# Patient Record
Sex: MALE | Race: WHITE | NOT HISPANIC OR LATINO | Employment: OTHER | URBAN - METROPOLITAN AREA
[De-identification: names, ages, dates, MRNs, and addresses within clinical notes are randomized per-mention and may not be internally consistent; named-entity substitution may affect disease eponyms.]

---

## 2017-03-28 RX ORDER — ATORVASTATIN CALCIUM 20 MG/1
TABLET, FILM COATED ORAL
Qty: 30 TABLET | Refills: 2 | Status: SHIPPED | OUTPATIENT
Start: 2017-03-28 | End: 2017-07-06 | Stop reason: SDUPTHER

## 2017-04-14 ENCOUNTER — TELEPHONE (OUTPATIENT)
Dept: INTERNAL MEDICINE | Facility: CLINIC | Age: 67
End: 2017-04-14

## 2017-04-14 RX ORDER — IRBESARTAN 300 MG/1
TABLET ORAL
Qty: 90 TABLET | Refills: 1 | Status: SHIPPED | OUTPATIENT
Start: 2017-04-14 | End: 2017-10-12 | Stop reason: SDUPTHER

## 2017-04-14 NOTE — TELEPHONE ENCOUNTER
----- Message from Karen Wu sent at 4/14/2017 10:47 AM EDT -----  Contact: Patient's wife  Patient's wife called stating patient having problems with sleep.  Goes to sleep okay but wakes up shortly after for several hours.  Would like a script for zolpidem #90.  Please advise.      Patient:  [phone]    Pharmacy:  [facility] [address] [place] [address] [address] AT [facility] [address] - [phone]  - [phone] FX

## 2017-04-14 NOTE — TELEPHONE ENCOUNTER
I recommend Belsomra which is better for sleep maintenance.   Rx Belsomra 15 mgm 1 po qhs prn insomnia #30 x 5 cfb

## 2017-04-18 ENCOUNTER — TELEPHONE (OUTPATIENT)
Dept: INTERNAL MEDICINE | Facility: CLINIC | Age: 67
End: 2017-04-18

## 2017-04-18 RX ORDER — ZOLPIDEM TARTRATE 10 MG/1
10 TABLET ORAL NIGHTLY PRN
Qty: 30 TABLET | Refills: 5
Start: 2017-04-18 | End: 2018-05-02 | Stop reason: SDUPTHER

## 2017-04-18 NOTE — TELEPHONE ENCOUNTER
----- Message from Margaret Greene sent at 4/18/2017  2:52 PM EDT -----  Contact: Spouse - Dr Mcginnis' pt - RE: new Rx  Spouse calling and would like a new Rx called to pt's pharmacy. Spouse informs that Rx costs over $400.00 and informs that pt can not afford Rx. Could you please call new Rx to pt's pharmacy? Please advise. Thanks      I recommend Belsomra which is better for sleep maintenance.   Rx Belsomra 15 mgm 1 po qhs prn insomnia #30 x 5 cfb    Spouse would like to know if pt could go back on [facility] [address] [place] [address] [address] AT [facility] [address] - [phone]  - [phone] (Phone)  [phone] (Fax)    Pt # [phone] or [phone]

## 2017-07-06 RX ORDER — ATORVASTATIN CALCIUM 20 MG/1
TABLET, FILM COATED ORAL
Qty: 30 TABLET | Refills: 1 | Status: SHIPPED | OUTPATIENT
Start: 2017-07-06 | End: 2017-08-28 | Stop reason: SDUPTHER

## 2017-08-28 RX ORDER — ATORVASTATIN CALCIUM 20 MG/1
TABLET, FILM COATED ORAL
Qty: 30 TABLET | Refills: 0 | Status: SHIPPED | OUTPATIENT
Start: 2017-08-28 | End: 2017-09-25 | Stop reason: SDUPTHER

## 2017-09-25 RX ORDER — ATORVASTATIN CALCIUM 20 MG/1
TABLET, FILM COATED ORAL
Qty: 30 TABLET | Refills: 0 | Status: SHIPPED | OUTPATIENT
Start: 2017-09-25 | End: 2017-10-23 | Stop reason: SDUPTHER

## 2017-10-12 DIAGNOSIS — E03.9 HYPOTHYROIDISM, UNSPECIFIED TYPE: ICD-10-CM

## 2017-10-12 RX ORDER — LEVOTHYROXINE SODIUM 88 UG/1
TABLET ORAL
Qty: 90 TABLET | Refills: 1 | Status: SHIPPED | OUTPATIENT
Start: 2017-10-12 | End: 2017-11-06 | Stop reason: DRUGHIGH

## 2017-10-12 RX ORDER — IRBESARTAN 300 MG/1
TABLET ORAL
Qty: 90 TABLET | Refills: 1 | Status: SHIPPED | OUTPATIENT
Start: 2017-10-12 | End: 2018-01-15 | Stop reason: SDUPTHER

## 2017-10-23 RX ORDER — ATORVASTATIN CALCIUM 20 MG/1
TABLET, FILM COATED ORAL
Qty: 30 TABLET | Refills: 3 | Status: SHIPPED | OUTPATIENT
Start: 2017-10-23 | End: 2018-02-19 | Stop reason: SDUPTHER

## 2017-11-01 ENCOUNTER — OFFICE VISIT (OUTPATIENT)
Dept: INTERNAL MEDICINE | Facility: CLINIC | Age: 67
End: 2017-11-01

## 2017-11-01 ENCOUNTER — HOSPITAL ENCOUNTER (OUTPATIENT)
Dept: GENERAL RADIOLOGY | Facility: HOSPITAL | Age: 67
Discharge: HOME | End: 2017-11-01
Admitting: NURSE PRACTITIONER

## 2017-11-01 VITALS
OXYGEN SATURATION: 98 % | HEIGHT: 71 IN | HEART RATE: 52 BPM | BODY MASS INDEX: 27.02 KG/M2 | SYSTOLIC BLOOD PRESSURE: 140 MMHG | WEIGHT: 193 LBS | DIASTOLIC BLOOD PRESSURE: 92 MMHG

## 2017-11-01 DIAGNOSIS — Z12.11 SCREENING FOR COLON CANCER: ICD-10-CM

## 2017-11-01 DIAGNOSIS — Z23 NEED FOR INFLUENZA VACCINATION: ICD-10-CM

## 2017-11-01 DIAGNOSIS — Z00.00 PE (PHYSICAL EXAM), ANNUAL: Primary | ICD-10-CM

## 2017-11-01 DIAGNOSIS — E03.9 HYPOTHYROIDISM, UNSPECIFIED TYPE: ICD-10-CM

## 2017-11-01 DIAGNOSIS — I10 BENIGN ESSENTIAL HTN: ICD-10-CM

## 2017-11-01 DIAGNOSIS — M54.41 ACUTE RIGHT-SIDED LOW BACK PAIN WITH RIGHT-SIDED SCIATICA: ICD-10-CM

## 2017-11-01 DIAGNOSIS — Z23 NEED FOR VACCINATION: ICD-10-CM

## 2017-11-01 DIAGNOSIS — Z12.5 SCREENING FOR PROSTATE CANCER: ICD-10-CM

## 2017-11-01 DIAGNOSIS — Z11.59 SCREENING FOR VIRAL DISEASE: ICD-10-CM

## 2017-11-01 LAB
ALBUMIN SERPL-MCNC: 4.7 G/DL (ref 3.5–5.2)
ALBUMIN/GLOB SERPL: 1.8 G/DL
ALP SERPL-CCNC: 67 U/L (ref 39–117)
ALT SERPL-CCNC: 28 U/L (ref 1–41)
AST SERPL-CCNC: 19 U/L (ref 1–40)
BASOPHILS # BLD AUTO: 0.03 10*3/MM3 (ref 0–0.2)
BASOPHILS NFR BLD AUTO: 0.6 % (ref 0–1.5)
BILIRUB SERPL-MCNC: 0.5 MG/DL (ref 0.1–1.2)
BILIRUB UR QL STRIP: NEGATIVE
BUN SERPL-MCNC: 19 MG/DL (ref 8–23)
BUN/CREAT SERPL: 14.8 (ref 7–25)
CALCIUM SERPL-MCNC: 9.5 MG/DL (ref 8.6–10.5)
CHLORIDE SERPL-SCNC: 101 MMOL/L (ref 98–107)
CHOLEST SERPL-MCNC: 151 MG/DL (ref 0–200)
CLARITY UR: CLEAR
CO2 SERPL-SCNC: 31.8 MMOL/L (ref 22–29)
COLOR UR: YELLOW
CREAT SERPL-MCNC: 1.28 MG/DL (ref 0.76–1.27)
EOSINOPHIL # BLD AUTO: 0.29 10*3/MM3 (ref 0–0.7)
EOSINOPHIL # BLD AUTO: 5.6 % (ref 0.3–6.2)
ERYTHROCYTE [DISTWIDTH] IN BLOOD BY AUTOMATED COUNT: 14.5 % (ref 11.5–14.5)
GLOBULIN SER CALC-MCNC: 2.6 GM/DL
GLUCOSE SERPL-MCNC: 95 MG/DL (ref 65–99)
GLUCOSE UR STRIP-MCNC: NEGATIVE MG/DL
HCT VFR BLD AUTO: 48.7 % (ref 40.4–52.2)
HDLC SERPL-MCNC: 58 MG/DL (ref 40–60)
HEMOCCULT STL QL IA: NEGATIVE
HGB BLD-MCNC: 15.9 G/DL (ref 13.7–17.6)
HGB UR QL STRIP.AUTO: NEGATIVE
IMM GRANULOCYTES # BLD: 0 10*3/MM3 (ref 0–0.03)
IMM GRANULOCYTES NFR BLD: 0 % (ref 0–0.5)
KETONES UR QL STRIP: NEGATIVE
LDLC SERPL CALC-MCNC: 77 MG/DL (ref 0–100)
LEUKOCYTE ESTERASE UR QL STRIP.AUTO: NEGATIVE
LYMPHOCYTES # BLD AUTO: 2.36 10*3/MM3 (ref 0.9–4.8)
LYMPHOCYTES NFR BLD AUTO: 45.5 % (ref 19.6–45.3)
MCH RBC QN AUTO: 32.4 PG (ref 27–32.7)
MCHC RBC AUTO-ENTMCNC: 32.6 G/DL (ref 32.6–36.4)
MCV RBC AUTO: 99.2 FL (ref 79.8–96.2)
MONOCYTES # BLD AUTO: 0.37 10*3/MM3 (ref 0.2–1.2)
MONOCYTES NFR BLD AUTO: 7.1 % (ref 5–12)
NEUTROPHILS # BLD AUTO: 2.14 10*3/MM3 (ref 1.9–8.1)
NEUTROPHILS NFR BLD AUTO: 41.2 % (ref 42.7–76)
NITRITE UR QL STRIP: NEGATIVE
PH UR STRIP.AUTO: 7 [PH] (ref 5–8)
PLATELET # BLD AUTO: 270 10*3/MM3 (ref 140–500)
POTASSIUM SERPL-SCNC: 4.5 MMOL/L (ref 3.5–5.2)
PROT SERPL-MCNC: 7.3 G/DL (ref 6–8.5)
PROT UR QL STRIP: NEGATIVE
PSA SERPL-MCNC: 1.56 NG/ML (ref 0–4)
RBC # BLD AUTO: 4.91 10*6/MM3 (ref 4.6–6)
SODIUM SERPL-SCNC: 143 MMOL/L (ref 136–145)
SP GR UR STRIP: 1.01 (ref 1–1.03)
TRIGL SERPL-MCNC: 80 MG/DL (ref 0–150)
TSH SERPL-ACNC: 5.11 MIU/ML (ref 0.27–4.2)
UROBILINOGEN UR QL STRIP: NORMAL
VLDLC SERPL-MCNC: 16 MG/DL (ref 5–40)
WBC # BLD AUTO: 5.19 10*3/MM3 (ref 4.5–10.7)

## 2017-11-01 PROCEDURE — G0009 ADMIN PNEUMOCOCCAL VACCINE: HCPCS | Performed by: NURSE PRACTITIONER

## 2017-11-01 PROCEDURE — 99397 PER PM REEVAL EST PAT 65+ YR: CPT | Performed by: NURSE PRACTITIONER

## 2017-11-01 PROCEDURE — 90662 IIV NO PRSV INCREASED AG IM: CPT | Performed by: NURSE PRACTITIONER

## 2017-11-01 PROCEDURE — G0008 ADMIN INFLUENZA VIRUS VAC: HCPCS | Performed by: NURSE PRACTITIONER

## 2017-11-01 PROCEDURE — 72110 X-RAY EXAM L-2 SPINE 4/>VWS: CPT

## 2017-11-01 PROCEDURE — 81003 URINALYSIS AUTO W/O SCOPE: CPT | Performed by: NURSE PRACTITIONER

## 2017-11-01 PROCEDURE — G0328 FECAL BLOOD SCRN IMMUNOASSAY: HCPCS | Performed by: NURSE PRACTITIONER

## 2017-11-01 PROCEDURE — 90670 PCV13 VACCINE IM: CPT | Performed by: NURSE PRACTITIONER

## 2017-11-01 PROCEDURE — 93000 ELECTROCARDIOGRAM COMPLETE: CPT | Performed by: NURSE PRACTITIONER

## 2017-11-02 PROBLEM — I10 BENIGN ESSENTIAL HTN: Status: ACTIVE | Noted: 2017-11-02

## 2017-11-02 LAB — HCV AB S/CO SERPL IA: <0.1 S/CO RATIO (ref 0–0.9)

## 2017-11-02 NOTE — PROGRESS NOTES
Subjective   Anoop Montenegro is a 67 y.o. male who presents for a physical exam.    HPI Comments: He c/o worsening low back pain which is worse after prolonged sitting/standing. Pain radiates into right hip and leg. His neck pain has actually improved (hx spondylosis).       The following portions of the patient's history were reviewed and updated as appropriate: allergies, current medications, past social history and problem list.    Past Medical History:   Diagnosis Date   • Allergic rhinitis    • Cervical spondylosis with myelopathy    • FHx: colonic polyps    • GERD (gastroesophageal reflux disease)    • History of colonic polyps    • Hypertension     Benign Essential   • Hypothyroidism    • Insomnia    • Malaise and fatigue    • Pure hypercholesterolemia          Current Outpatient Prescriptions:   •  atorvastatin (LIPITOR) 20 MG tablet, TAKE 1 TABLET BY MOUTH ONE TIME A DAY , Disp: 30 tablet, Rfl: 3  •  irbesartan (AVAPRO) 300 MG tablet, TAKE 1 TABLET EVERY DAY, Disp: 90 tablet, Rfl: 1  •  levothyroxine (SYNTHROID, LEVOTHROID) 88 MCG tablet, TAKE 1 TABLET EVERY DAY, Disp: 90 tablet, Rfl: 1  •  meloxicam (MOBIC) 15 MG tablet, Take 1 tablet by mouth Daily., Disp: 90 tablet, Rfl: 1  •  Multiple Vitamins-Minerals (MULTIVITAMIN & MINERAL PO), Take 1 tablet by mouth daily., Disp: , Rfl:   •  zolpidem (AMBIEN) 10 MG tablet, Take 1 tablet by mouth At Night As Needed for Sleep., Disp: 30 tablet, Rfl: 5    No Known Allergies    Review of Systems   Constitutional: Negative for activity change, appetite change, chills, diaphoresis, fatigue, fever and unexpected weight change.   HENT: Negative for congestion, dental problem, drooling, ear discharge, ear pain, facial swelling, hearing loss, mouth sores, nosebleeds, postnasal drip, rhinorrhea, sinus pressure, sore throat, tinnitus and trouble swallowing.    Eyes: Negative for photophobia, pain, discharge, redness, itching and visual disturbance.   Respiratory: Negative for  "apnea, cough, choking, chest tightness, shortness of breath and wheezing.    Cardiovascular: Negative for chest pain, palpitations and leg swelling.        No orthopnea, PND, BURNS   Gastrointestinal: Negative for abdominal pain, blood in stool, constipation, diarrhea, nausea and vomiting.   Endocrine: Negative for cold intolerance, heat intolerance, polydipsia and polyuria.   Genitourinary: Negative for decreased urine volume, dysuria, enuresis, flank pain, frequency, hematuria and urgency.   Musculoskeletal: Positive for back pain. Negative for arthralgias, gait problem, joint swelling, myalgias, neck pain and neck stiffness.   Skin: Negative for color change and rash.        No hair changes, no nail changes   Allergic/Immunologic: Negative for environmental allergies, food allergies and immunocompromised state.   Neurological: Negative for dizziness, tremors, seizures, syncope, speech difficulty, weakness, light-headedness, numbness and headaches.   Hematological: Negative for adenopathy. Does not bruise/bleed easily.   Psychiatric/Behavioral: Negative for agitation, confusion, decreased concentration, dysphoric mood, sleep disturbance and suicidal ideas. The patient is not nervous/anxious.        Objective   Vitals:    11/01/17 0806   BP: 140/92   BP Location: Left arm   Patient Position: Sitting   Cuff Size: Adult   Pulse: 52   SpO2: 98%   Weight: 193 lb (87.5 kg)   Height: 71\" (180.3 cm)     Physical Exam   Constitutional: He is oriented to person, place, and time. He appears well-developed and well-nourished. No distress.   HENT:   Head: Normocephalic and atraumatic.   Right Ear: External ear normal.   Left Ear: External ear normal.   Nose: Nose normal.   Mouth/Throat: Oropharynx is clear and moist.   Eyes: Conjunctivae and EOM are normal. Pupils are equal, round, and reactive to light. Right eye exhibits no discharge. Left eye exhibits no discharge. No scleral icterus.   Neck: Normal range of motion. Neck " supple. No JVD present. No tracheal deviation present. No thyromegaly present.   Cardiovascular: Normal rate, regular rhythm, normal heart sounds and intact distal pulses.  Exam reveals no gallop and no friction rub.    No murmur heard.  Pulmonary/Chest: Effort normal and breath sounds normal. No respiratory distress. He has no wheezes. He has no rales. He exhibits no tenderness.   Abdominal: Soft. Bowel sounds are normal. He exhibits no distension and no mass. There is no tenderness. There is no rebound and no guarding. No hernia.   Genitourinary: Rectum normal, prostate normal and penis normal. Rectal exam shows guaiac negative stool.   Musculoskeletal: Normal range of motion. He exhibits no edema.        Lumbar back: He exhibits tenderness.   Lymphadenopathy:     He has no cervical adenopathy.   Neurological: He is alert and oriented to person, place, and time. He has normal reflexes. No cranial nerve deficit. He exhibits normal muscle tone. Coordination normal.   Skin: Skin is warm and dry. No rash noted. No erythema.   Psychiatric: He has a normal mood and affect. His behavior is normal. Judgment and thought content normal.   Vitals reviewed.      Assessment/Plan   Anoop was seen today for annual exam.    Diagnoses and all orders for this visit:    PE (physical exam), annual  Comments:  check fasting labs today  Orders:  -     CBC Auto Differential; Future  -     Comprehensive Metabolic Panel; Future  -     Lipid Panel; Future  -     TSH; Future  -     Urinalysis With / Microscopic If Indicated - Urine, Clean Catch; Future  -     CBC Auto Differential  -     Comprehensive Metabolic Panel  -     Lipid Panel  -     TSH  -     Urinalysis With / Microscopic If Indicated - Urine, Clean Catch    Hypothyroidism, unspecified type    Acute right-sided low back pain with right-sided sciatica  Comments:  send for xray to further evaluate  Orders:  -     XR Spine Lumbar 4+ View    Benign essential HTN  Comments:  slightly  elevated today, monitor at home and call with readings in 2-3 weeks    Screening for prostate cancer  -     PSA Screen; Future  -     PSA Screen    Screening for viral disease  -     Hepatitis C Antibody; Future  -     Hepatitis C Antibody    Need for vaccination  -     Pneumococcal Conjugate Vaccine 13-Valent All    Need for influenza vaccination  -     Flu Vaccine High Dose PF 65YR+    Screening for colon cancer  -     POC FECAL OCCULT BLOOD BY IMMUNOASSAY    Other orders  -     ECG 12 Lead      ECG 12 Lead  Date/Time: 11/1/2017 8:23 AM  Performed by: LEONORA BLAND  Authorized by: VALENCIA GUTHRIE   Comparison: compared with previous ECG from 10/10/2016  Similar to previous ECG  Comparison to previous ECG: No acute changes  Rhythm: sinus bradycardia  Rate comments: 52  Conduction comments: Left anterior fascicular block  ST Segments: ST segments normal  T Waves: T waves normal  QRS axis: left (QRS Duration 114)  Other: no other findings  Clinical impression: non-specific ECG  Comments: QT Interval 412  Corrected QT Interval 398  Left axis deviation

## 2017-11-06 ENCOUNTER — TELEPHONE (OUTPATIENT)
Dept: INTERNAL MEDICINE | Facility: CLINIC | Age: 67
End: 2017-11-06

## 2017-11-06 DIAGNOSIS — E03.9 HYPOTHYROIDISM, UNSPECIFIED TYPE: Primary | ICD-10-CM

## 2017-11-06 DIAGNOSIS — G89.29 CHRONIC RIGHT-SIDED LOW BACK PAIN, WITH SCIATICA PRESENCE UNSPECIFIED: ICD-10-CM

## 2017-11-06 DIAGNOSIS — M54.5 CHRONIC RIGHT-SIDED LOW BACK PAIN, WITH SCIATICA PRESENCE UNSPECIFIED: ICD-10-CM

## 2017-11-06 RX ORDER — LEVOTHYROXINE SODIUM 0.1 MG/1
100 TABLET ORAL DAILY
Qty: 90 TABLET | Refills: 1 | Status: SHIPPED | OUTPATIENT
Start: 2017-11-06 | End: 2018-03-20 | Stop reason: SDUPTHER

## 2017-11-06 NOTE — TELEPHONE ENCOUNTER
----- Message from DONNIE Alex sent at 11/6/2017 12:36 PM EST -----  Please call pt and schedule a 6-8 week f/u appt (discussed with him). Thanks.

## 2017-11-06 NOTE — PROGRESS NOTES
Reviewed results of xray with patient-he continues to c/o pain in the low back radiating into the right leg. He will be scheduled for a MRI to further evaluate and a f/u appt with Dr. Riley. Also discussed elevated TSH and need to increase Synthroid from 88mcg to 100mcg daily, recheck TSH in 6-8 weeks.

## 2017-11-14 ENCOUNTER — TELEPHONE (OUTPATIENT)
Dept: INTERNAL MEDICINE | Facility: CLINIC | Age: 67
End: 2017-11-14

## 2017-11-14 NOTE — TELEPHONE ENCOUNTER
----- Message from Margaret Greene sent at 11/14/2017  9:26 AM EST -----  Contact: pt - Dr Mcginnis' pt - RE: call  Pt calling and would like to discuss B/P readings. Pt would like a return call to discuss. Please call pt.      Pt # [phone]

## 2017-11-17 ENCOUNTER — HOSPITAL ENCOUNTER (OUTPATIENT)
Dept: MRI IMAGING | Facility: HOSPITAL | Age: 67
Discharge: HOME | End: 2017-11-17
Admitting: NURSE PRACTITIONER

## 2017-11-17 DIAGNOSIS — G89.29 CHRONIC RIGHT-SIDED LOW BACK PAIN, WITH SCIATICA PRESENCE UNSPECIFIED: ICD-10-CM

## 2017-11-17 DIAGNOSIS — M54.5 CHRONIC RIGHT-SIDED LOW BACK PAIN, WITH SCIATICA PRESENCE UNSPECIFIED: ICD-10-CM

## 2017-11-17 PROCEDURE — 72148 MRI LUMBAR SPINE W/O DYE: CPT

## 2017-11-21 DIAGNOSIS — M51.36 DEGENERATIVE DISC DISEASE, LUMBAR: Primary | ICD-10-CM

## 2017-12-06 ENCOUNTER — OFFICE VISIT (OUTPATIENT)
Dept: ORTHOPEDIC SURGERY | Facility: CLINIC | Age: 67
End: 2017-12-06

## 2017-12-06 VITALS — WEIGHT: 190 LBS | BODY MASS INDEX: 26.6 KG/M2 | TEMPERATURE: 98.1 F | HEIGHT: 71 IN

## 2017-12-06 DIAGNOSIS — M77.12 LATERAL EPICONDYLITIS OF LEFT ELBOW: ICD-10-CM

## 2017-12-06 DIAGNOSIS — M48.061 SPINAL STENOSIS OF LUMBAR REGION, UNSPECIFIED WHETHER NEUROGENIC CLAUDICATION PRESENT: Primary | ICD-10-CM

## 2017-12-06 PROCEDURE — 99204 OFFICE O/P NEW MOD 45 MIN: CPT | Performed by: ORTHOPAEDIC SURGERY

## 2017-12-06 NOTE — PROGRESS NOTES
New patient or new problem visit    Chief Complaint   Patient presents with   • Lumbar Spine - Pain       HPI: He complains of a 20 year history of chronic low back pain which is starting to radiate into the right lower extremity over the last 2 months.  Although his had no weakness the pain is sometimes moderate to severe intermittent stabbing and aching and when present the leg pain is worse in the back which is more omnipresent.  I saw him a couple of years ago for similar complaints and he improved with a Dosepak.  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    PFSH: See chart- reviewed    Review of Systems   Constitutional: Negative for chills, fever and unexpected weight change.   HENT: Negative for trouble swallowing and voice change.    Eyes: Negative for visual disturbance.   Respiratory: Negative for cough and shortness of breath.    Cardiovascular: Negative for chest pain and leg swelling.   Gastrointestinal: Negative for abdominal pain, nausea and vomiting.   Endocrine: Negative for cold intolerance and heat intolerance.   Genitourinary: Negative for difficulty urinating, frequency and urgency.   Skin: Negative for rash and wound.   Allergic/Immunologic: Negative for immunocompromised state.   Neurological: Negative for weakness and numbness.   Hematological: Does not bruise/bleed easily.   Psychiatric/Behavioral: Negative for dysphoric mood. The patient is not nervous/anxious.        PE: Constitutional: Vital signs above-noted.  Awake, alert and oriented    Psychiatric: Affect and insight do not appear grossly disturbed.    Pulmonary: Breathing is unlabored, color is good.    Skin: Warm, dry and normal turgor    Cardiac:  pedal pulses intact.  No edema.    Eyesight and hearing appear adequate for examination purposes      Musculoskeletal:  There is mild tenderness to percussion and palpation of the spine. Motion appears undisturbed.  Posture is  unremarkable to coronal and sagittal inspection.    The skin about the area is notable for an extra skin fold on the left side.  There is no palpable or visible deformity.  There is no local spasm.       Neurologic:   Reflexes are 2+ and symmetrical in the patellae and achilles.   Motor function is undisturbed in quadriceps, EHL, and gastrocnemius      Sensation appears symmetrically intact to light touch   .  In the bilateral lower extremities there is no evidence of atrophy.   Clonus is absent..  Gait appears undisturbed.  SLR test negative    He has some tenderness over the lateral condyle the left elbow andpain to resisted extension of the right middle finger      MEDICAL DECISION MAKING    XRAY: Plain film x-rays the lumbar spine from [facility] obtained previously demonstrate multilevel disc degeneration and lumbar scoliosis with the counter curve at the L4 5 and lumbosacral junction and a major curve on the right side above around the to 3.  Overall well-balanced in the coronal and sagittal planes.  MRI scan of the lumbar spine show some left-sided L4 5 stenosis and some right-sided 3 for changes and to 3 changes.  I agree with the radiologist report    Other: n/a    Impression: Lumbar scoliosis, lumbar spinal stenosis worsening.  Left tennis elbow.    Plan: I plan lumbar epidural steroid injections for pain relief and he'll continue home exercises and cardiovascular work.  I will see him back as needed.  He is going to see Dr. Mcduffie for evaluation and treatment of tennis elbow.

## 2017-12-21 ENCOUNTER — LAB (OUTPATIENT)
Dept: INTERNAL MEDICINE | Facility: CLINIC | Age: 67
End: 2017-12-21

## 2017-12-21 DIAGNOSIS — E03.9 HYPOTHYROIDISM, UNSPECIFIED TYPE: ICD-10-CM

## 2017-12-21 LAB — TSH SERPL-ACNC: 1.53 MIU/ML (ref 0.27–4.2)

## 2018-01-04 ENCOUNTER — ANESTHESIA EVENT (OUTPATIENT)
Dept: PAIN MEDICINE | Facility: HOSPITAL | Age: 68
End: 2018-01-04

## 2018-01-04 ENCOUNTER — ANESTHESIA (OUTPATIENT)
Dept: PAIN MEDICINE | Facility: HOSPITAL | Age: 68
End: 2018-01-04

## 2018-01-04 ENCOUNTER — HOSPITAL ENCOUNTER (OUTPATIENT)
Dept: GENERAL RADIOLOGY | Facility: HOSPITAL | Age: 68
Discharge: HOME | End: 2018-01-04

## 2018-01-04 ENCOUNTER — HOSPITAL ENCOUNTER (OUTPATIENT)
Dept: PAIN MEDICINE | Facility: HOSPITAL | Age: 68
Discharge: HOME | End: 2018-01-04
Attending: ORTHOPAEDIC SURGERY | Admitting: ORTHOPAEDIC SURGERY

## 2018-01-04 VITALS
HEART RATE: 67 BPM | DIASTOLIC BLOOD PRESSURE: 93 MMHG | SYSTOLIC BLOOD PRESSURE: 132 MMHG | WEIGHT: 190 LBS | TEMPERATURE: 97.7 F | HEIGHT: 71 IN | RESPIRATION RATE: 16 BRPM | OXYGEN SATURATION: 100 % | BODY MASS INDEX: 26.6 KG/M2

## 2018-01-04 DIAGNOSIS — M48.061 SPINAL STENOSIS OF LUMBAR REGION, UNSPECIFIED WHETHER NEUROGENIC CLAUDICATION PRESENT: ICD-10-CM

## 2018-01-04 DIAGNOSIS — R52 PAIN: ICD-10-CM

## 2018-01-04 PROCEDURE — 25010000002 METHYLPREDNISOLONE PER 80 MG: Performed by: ANESTHESIOLOGY

## 2018-01-04 PROCEDURE — C1755 CATHETER, INTRASPINAL: HCPCS

## 2018-01-04 PROCEDURE — 77003 FLUOROGUIDE FOR SPINE INJECT: CPT

## 2018-01-04 PROCEDURE — 0 IOPAMIDOL 41 % SOLUTION: Performed by: ANESTHESIOLOGY

## 2018-01-04 RX ORDER — FENTANYL CITRATE 50 UG/ML
50 INJECTION, SOLUTION INTRAMUSCULAR; INTRAVENOUS AS NEEDED
Status: DISCONTINUED | OUTPATIENT
Start: 2018-01-04 | End: 2018-01-05 | Stop reason: HOSPADM

## 2018-01-04 RX ORDER — SODIUM CHLORIDE 0.9 % (FLUSH) 0.9 %
1-10 SYRINGE (ML) INJECTION AS NEEDED
Status: DISCONTINUED | OUTPATIENT
Start: 2018-01-04 | End: 2018-01-05 | Stop reason: HOSPADM

## 2018-01-04 RX ORDER — METHYLPREDNISOLONE ACETATE 80 MG/ML
80 INJECTION, SUSPENSION INTRA-ARTICULAR; INTRALESIONAL; INTRAMUSCULAR; SOFT TISSUE ONCE
Status: COMPLETED | OUTPATIENT
Start: 2018-01-04 | End: 2018-01-04

## 2018-01-04 RX ORDER — MIDAZOLAM HYDROCHLORIDE 1 MG/ML
1 INJECTION INTRAMUSCULAR; INTRAVENOUS AS NEEDED
Status: DISCONTINUED | OUTPATIENT
Start: 2018-01-04 | End: 2018-01-05 | Stop reason: HOSPADM

## 2018-01-04 RX ORDER — LIDOCAINE HYDROCHLORIDE 10 MG/ML
1 INJECTION, SOLUTION INFILTRATION; PERINEURAL ONCE AS NEEDED
Status: DISCONTINUED | OUTPATIENT
Start: 2018-01-04 | End: 2018-01-05 | Stop reason: HOSPADM

## 2018-01-04 RX ADMIN — METHYLPREDNISOLONE ACETATE 80 MG: 80 INJECTION, SUSPENSION INTRA-ARTICULAR; INTRALESIONAL; INTRAMUSCULAR; SOFT TISSUE at 08:46

## 2018-01-04 RX ADMIN — IOPAMIDOL 10 ML: 408 INJECTION, SOLUTION INTRATHECAL at 08:46

## 2018-01-04 NOTE — H&P
[facility]    History and Physical    Patient Name: Anoop Montenegro  :  1950  MRN:  [id number]  Date of Admission: 2018    Subjective     Patient is a 67 y.o. male presents with chief complaint of chronic, moderate, waxing and waning low back and leg: right pain.  Onset of symptoms was gradual starting several years ago.  Symptoms are associated/aggravated by activity. Symptoms improve with nothing, did try a dose pack several years ago that helped.  Presents for lesi 1.      The following portions of the patients history were reviewed and updated as appropriate: current medications, allergies, past medical history, past surgical history, past family history, past social history and problem list                Objective     Past Medical History:   Past Medical History:   Diagnosis Date   • Allergic rhinitis    • Cervical spondylosis with myelopathy    • FHx: colonic polyps    • GERD (gastroesophageal reflux disease)    • History of colonic polyps    • Hypertension     Benign Essential   • Hypothyroidism    • Insomnia    • Malaise and fatigue    • Pure hypercholesterolemia      Past Surgical History:   Past Surgical History:   Procedure Laterality Date   • COLONOSCOPY N/A 2011   • COLONOSCOPY N/A 2005   • COLONOSCOPY N/A 11/15/2013   • HAND SURGERY Left     3rd finger     Family History:   Family History   Problem Relation Age of Onset   • Hypertension Mother    • Heart failure Mother    • Aortic aneurysm Father    • Prostate cancer Brother    • Heart disease Brother    • Hypertension Brother    • Liver cancer Sister    • Breast cancer Sister      Social History:   Social History   Substance Use Topics   • Smoking status: Never Smoker   • Smokeless tobacco: Never Used   • Alcohol use Yes      Comment: social       Vital Signs Range for the last 24 hours  Temperature:     Temp Source:     BP:     Pulse:     Respirations:     SPO2:     O2 Amount (l/min):     O2 Devices     Weight:            --------------------------------------------------------------------------------    Current Outpatient Prescriptions   Medication Sig Dispense Refill   • atorvastatin (LIPITOR) 20 MG tablet TAKE 1 TABLET BY MOUTH ONE TIME A DAY  30 tablet 3   • irbesartan (AVAPRO) 300 MG tablet TAKE 1 TABLET EVERY DAY 90 tablet 1   • levothyroxine (SYNTHROID, LEVOTHROID) 100 MCG tablet Take 1 tablet by mouth Daily. 90 tablet 1   • meloxicam (MOBIC) 15 MG tablet Take 1 tablet by mouth Daily. 90 tablet 1   • Multiple Vitamins-Minerals (MULTIVITAMIN & MINERAL PO) Take 1 tablet by mouth daily.     • zolpidem (AMBIEN) 10 MG tablet Take 1 tablet by mouth At Night As Needed for Sleep. 30 tablet 5     No current facility-administered medications for this encounter.        --------------------------------------------------------------------------------  Assessment/Plan      Anesthesia Evaluation     Patient summary reviewed and Nursing notes reviewed   no history of anesthetic complications:         Airway   Mallampati: II  TM distance: >3 FB  Dental - normal exam     Pulmonary - negative pulmonary ROS and normal exam   Cardiovascular - normal exam  Exercise tolerance: good (4-7 METS)    (+) hypertension, hyperlipidemia      Neuro/Psych- negative ROS and neuro exam normal  GI/Hepatic/Renal/Endo    (+)  GERD, hypothyroidism,     Musculoskeletal (-) normal exam    Abdominal  - normal exam   Substance History - negative use     OB/GYN negative ob/gyn ROS         Other   (+) arthritis                                        Diagnosis and Plan   Plan:  1. Epidural with fluoroscopy +/- epidurogram (if needed)  2. Physical therapy exercises at home as prescribed by physical therapy or from the pain clinic handout (given to the patient). Continuation of these exercises every day, or multiple times per week, even when the patient has good pain relief, was stressed to the patient as a preventative measure to decrease the frequency and severity  of future pain episodes.  3. Continue pain medicines as already prescribed. If patient not currently taking any, it is recommended to begin Acetaminophen 1000 mg po q 8 hours. If other medicines containing Acetaminophen are currently prescribed, maintain daily dose at 3000mg.   4. If they can tolerate NSAIDS, it is recommended to take Ibuprofen 600 mg po q 6 hours for 7 days during pain exacerbations. Alternatively, they may substitute an NSAID of their choice (e.g. Aleve)  5. Heat and ice to the affected area as tolerated for pain control. It was discussed that heating pads can cause burns.  6. Low impact exercise such as walking or water exercise was recommended to maintain overall health and aid in weight control.   7. Follow up as needed for subsequent injections.  8. Patient was counseled to abstain from tobacco products.      Treatment Plan  ASA 2      Procedures: Lumbar Epidural Steroid Injection(LESI), With fluoroscopy,       Anesthetic plan and risks discussed with patient.          Diagnosis     * Chronic low back pain [M54.5, G89.29]     * Lumbar spinal stenosis [M48.061]

## 2018-01-04 NOTE — ANESTHESIA PROCEDURE NOTES
PAIN Epidural block    Patient location during procedure: pain clinic  Start Time: 1/4/2018 8:42 AM  Stop Time: 1/4/2018 8:48 AM  Indication:procedure for pain  Performed By  Anesthesiologist: KARRIE MORRIS  Preanesthetic Checklist  Completed: patient identified, site marked, surgical consent, pre-op evaluation, timeout performed, IV checked, risks and benefits discussed and monitors and equipment checked  Additional Notes  Lumbar spinal stenosis  Fluoro used  Prep:  Pt Position:prone  Sterile Tech:cap, gloves, mask and sterile barrier  Prep:chlorhexidine gluconate and isopropyl alcohol  Monitoring:blood pressure monitoring, continuous pulse oximetry and EKG  Procedure:  Sedation: no   Approach:right paramedian  Guidance: fluoroscopy  Location:lumbar  Level:4-5  Needle Type:Tuohy  Needle Gauge:20  Aspiration:negative  Medications:  Depomedrol:80  Preservative Free Saline:3mL  Isovue:3mL  Comments:B/l spread  Post Assessment:  Dressing:occlusive dressing applied  Pt Tolerance:patient tolerated the procedure well with no apparent complications  Complications:no

## 2018-01-05 ENCOUNTER — CONSULT (OUTPATIENT)
Dept: ORTHOPEDIC SURGERY | Facility: CLINIC | Age: 68
End: 2018-01-05

## 2018-01-05 VITALS — WEIGHT: 190 LBS | HEIGHT: 70 IN | BODY MASS INDEX: 27.2 KG/M2

## 2018-01-05 DIAGNOSIS — M25.522 LEFT ELBOW PAIN: Primary | ICD-10-CM

## 2018-01-05 DIAGNOSIS — M77.12 LATERAL EPICONDYLITIS OF LEFT ELBOW: ICD-10-CM

## 2018-01-05 PROCEDURE — 99213 OFFICE O/P EST LOW 20 MIN: CPT | Performed by: ORTHOPAEDIC SURGERY

## 2018-01-05 PROCEDURE — 73070 X-RAY EXAM OF ELBOW: CPT | Performed by: ORTHOPAEDIC SURGERY

## 2018-01-05 PROCEDURE — 20605 DRAIN/INJ JOINT/BURSA W/O US: CPT | Performed by: ORTHOPAEDIC SURGERY

## 2018-01-05 RX ADMIN — METHYLPREDNISOLONE ACETATE 160 MG: 80 INJECTION, SUSPENSION INTRA-ARTICULAR; INTRALESIONAL; INTRAMUSCULAR; SOFT TISSUE at 14:21

## 2018-01-05 RX ADMIN — BUPIVACAINE HYDROCHLORIDE 2 ML: 5 INJECTION, SOLUTION EPIDURAL; INTRACAUDAL at 14:21

## 2018-01-05 NOTE — PROGRESS NOTES
New Left Elbow      Patient: Anoop Montenegro        YOB: 1950        Chief Complaints: Left Elbow Pain      History of Present Illness:  This is a  67 y.o. male who presents complaining of left elbow pain is right-hand-dominant she had in his had about 10 years he did see some [place] her about 5 years ago it resolving is no numbness or tingling he does too would shop and is quite active.  His symptoms are mild intermittent aching he is retired his past medical history marked for thyroid disease  Chief Complaint   Patient presents with   • Left Elbow - Pain, Establish Care             Allergies: No Known Allergies    Medications:   Home Medications:  Current Outpatient Prescriptions on File Prior to Visit   Medication Sig   • atorvastatin (LIPITOR) 20 MG tablet TAKE 1 TABLET BY MOUTH ONE TIME A DAY    • irbesartan (AVAPRO) 300 MG tablet TAKE 1 TABLET EVERY DAY   • levothyroxine (SYNTHROID, LEVOTHROID) 100 MCG tablet Take 1 tablet by mouth Daily.   • meloxicam (MOBIC) 15 MG tablet Take 1 tablet by mouth Daily.   • Multiple Vitamins-Minerals (MULTIVITAMIN & MINERAL PO) Take 1 tablet by mouth daily.   • zolpidem (AMBIEN) 10 MG tablet Take 1 tablet by mouth At Night As Needed for Sleep.     Current Facility-Administered Medications on File Prior to Visit   Medication   • [DISCONTINUED] fentaNYL citrate (PF) (SUBLIMAZE) injection 50 mcg   • [DISCONTINUED] lidocaine (XYLOCAINE) 1 % injection 1 mL   • [DISCONTINUED] midazolam (VERSED) injection 1 mg   • [DISCONTINUED] sodium chloride 0.9 % flush 1-10 mL     Current Medications:  Scheduled Meds:  Continuous Infusions:  No current facility-administered medications for this visit.   PRN Meds:.    Past Medical History:   Diagnosis Date   • Allergic rhinitis    • Cervical spondylosis with myelopathy    • FHx: colonic polyps    • GERD (gastroesophageal reflux disease)    • History of colonic polyps    • Hypertension     Benign Essential   • Hypothyroidism    •  "Insomnia    • Malaise and fatigue    • Pure hypercholesterolemia         Past Surgical History:   Procedure Laterality Date   • COLONOSCOPY N/A 11/04/2011   • COLONOSCOPY N/A 06/2005   • COLONOSCOPY N/A 11/15/2013   • HAND SURGERY Left     3rd finger        Social History     Occupational History   • Not on file.     Social History Main Topics   • Smoking status: Never Smoker   • Smokeless tobacco: Never Used   • Alcohol use Yes      Comment: social   • Drug use: Defer   • Sexual activity: Defer    Social History     Social History Narrative        Family History   Problem Relation Age of Onset   • Hypertension Mother    • Heart failure Mother    • Aortic aneurysm Father    • Prostate cancer Brother    • Heart disease Brother    • Hypertension Brother    • Liver cancer Sister    • Breast cancer Sister              Review of Systems: 14 point review of systems are remarkable for the pertinent positives listed in chart by the patient the remainder are negative  Review of Systems      Physical Exam: 67 y.o. male  General Appearance:    Alert, cooperative, in no acute distress                 Vitals:    01/05/18 1442   Weight: 86.2 kg (190 lb)   Height: 177.8 cm (70\")      Patient is alert and read ×3 no acute distress appears her above-listed at height weight and age.  Affect is normal respiratory rate is normal unlabored. Heart rate regular rate rhythm, sclera, dentition and hearing are normal for the purpose of this exam.        Ortho Exam   Physical exam of the left elbow reveals no effusion no redness.  The patient has full range of motion.  They have tenderness over the lateral condyle.  There pain with resisted wrist extension no pain with passive wrist flexion.  They have a negative Tinel's.  Have no overlying skin changes no lymphedema no lymphadenopathy.  Good distal pulses         Radiology:   AP, Lateral of the  left elbow were ordered/reviewed to evaluate pain.  I've no comparative films these are normal I " see no evidence of any acute bony pathology      Assessment/Plan:  Lateral epicondylitis I do think that's what it is a think he responded well to conservative management the past I think and injections quite reasonable he was agreeable do that if he fails improvement we will pursue other means of testing                                Medium Joint Arthrocentesis  Date/Time: 1/5/2018 2:21 PM  Consent given by: patient  Site marked: site marked  Timeout: Immediately prior to procedure a time out was called to verify the correct patient, procedure, equipment, support staff and site/side marked as required   Supporting Documentation  Indications: pain   Procedure Details  Location: elbow - L elbow  Preparation: Patient was prepped and draped in the usual sterile fashion  Needle size: 25 G  Medications administered: 160 mg methylPREDNISolone acetate 80 MG/ML; 2 mL bupivacaine (PF) 0.5 %  Patient tolerance: patient tolerated the procedure well with no immediate complications

## 2018-01-15 RX ORDER — IRBESARTAN 300 MG/1
TABLET ORAL
Qty: 90 TABLET | Refills: 1 | Status: SHIPPED | OUTPATIENT
Start: 2018-01-15 | End: 2018-10-23 | Stop reason: SDUPTHER

## 2018-01-15 RX ORDER — MELOXICAM 15 MG/1
TABLET ORAL
Qty: 90 TABLET | Refills: 1 | Status: SHIPPED | OUTPATIENT
Start: 2018-01-15 | End: 2018-06-23 | Stop reason: SDUPTHER

## 2018-01-17 ENCOUNTER — TRANSCRIBE ORDERS (OUTPATIENT)
Dept: PAIN MEDICINE | Facility: HOSPITAL | Age: 68
End: 2018-01-17

## 2018-01-17 ENCOUNTER — TELEPHONE (OUTPATIENT)
Dept: ORTHOPEDIC SURGERY | Facility: CLINIC | Age: 68
End: 2018-01-17

## 2018-01-17 DIAGNOSIS — M48.061 SPINAL STENOSIS OF LUMBAR REGION, UNSPECIFIED WHETHER NEUROGENIC CLAUDICATION PRESENT: Primary | ICD-10-CM

## 2018-01-19 RX ORDER — METHYLPREDNISOLONE ACETATE 80 MG/ML
160 INJECTION, SUSPENSION INTRA-ARTICULAR; INTRALESIONAL; INTRAMUSCULAR; SOFT TISSUE
Status: COMPLETED | OUTPATIENT
Start: 2018-01-05 | End: 2018-01-05

## 2018-01-19 RX ORDER — BUPIVACAINE HYDROCHLORIDE 5 MG/ML
2 INJECTION, SOLUTION EPIDURAL; INTRACAUDAL
Status: COMPLETED | OUTPATIENT
Start: 2018-01-05 | End: 2018-01-05

## 2018-02-05 ENCOUNTER — ANESTHESIA EVENT (OUTPATIENT)
Dept: PAIN MEDICINE | Facility: HOSPITAL | Age: 68
End: 2018-02-05

## 2018-02-05 ENCOUNTER — HOSPITAL ENCOUNTER (OUTPATIENT)
Dept: PAIN MEDICINE | Facility: HOSPITAL | Age: 68
Discharge: HOME | End: 2018-02-05
Admitting: ORTHOPAEDIC SURGERY

## 2018-02-05 ENCOUNTER — ANESTHESIA (OUTPATIENT)
Dept: PAIN MEDICINE | Facility: HOSPITAL | Age: 68
End: 2018-02-05

## 2018-02-05 ENCOUNTER — TRANSCRIBE ORDERS (OUTPATIENT)
Dept: PAIN MEDICINE | Facility: HOSPITAL | Age: 68
End: 2018-02-05

## 2018-02-05 ENCOUNTER — HOSPITAL ENCOUNTER (OUTPATIENT)
Dept: GENERAL RADIOLOGY | Facility: HOSPITAL | Age: 68
Discharge: HOME | End: 2018-02-05

## 2018-02-05 VITALS
OXYGEN SATURATION: 99 % | SYSTOLIC BLOOD PRESSURE: 131 MMHG | HEART RATE: 70 BPM | TEMPERATURE: 97.9 F | DIASTOLIC BLOOD PRESSURE: 93 MMHG | RESPIRATION RATE: 16 BRPM

## 2018-02-05 DIAGNOSIS — M48.061 SPINAL STENOSIS OF LUMBAR REGION, UNSPECIFIED WHETHER NEUROGENIC CLAUDICATION PRESENT: Primary | ICD-10-CM

## 2018-02-05 DIAGNOSIS — M48.061 SPINAL STENOSIS OF LUMBAR REGION, UNSPECIFIED WHETHER NEUROGENIC CLAUDICATION PRESENT: ICD-10-CM

## 2018-02-05 DIAGNOSIS — R52 PAIN: ICD-10-CM

## 2018-02-05 PROCEDURE — 77003 FLUOROGUIDE FOR SPINE INJECT: CPT

## 2018-02-05 PROCEDURE — C1755 CATHETER, INTRASPINAL: HCPCS

## 2018-02-05 PROCEDURE — 0 IOPAMIDOL 41 % SOLUTION: Performed by: ANESTHESIOLOGY

## 2018-02-05 PROCEDURE — 25010000002 METHYLPREDNISOLONE PER 80 MG: Performed by: ANESTHESIOLOGY

## 2018-02-05 RX ORDER — METHYLPREDNISOLONE ACETATE 80 MG/ML
80 INJECTION, SUSPENSION INTRA-ARTICULAR; INTRALESIONAL; INTRAMUSCULAR; SOFT TISSUE ONCE
Status: COMPLETED | OUTPATIENT
Start: 2018-02-05 | End: 2018-02-05

## 2018-02-05 RX ORDER — FENTANYL CITRATE 50 UG/ML
50 INJECTION, SOLUTION INTRAMUSCULAR; INTRAVENOUS AS NEEDED
Status: DISCONTINUED | OUTPATIENT
Start: 2018-02-05 | End: 2018-02-06 | Stop reason: HOSPADM

## 2018-02-05 RX ORDER — ASPIRIN 81 MG/1
81 TABLET, CHEWABLE ORAL DAILY
COMMUNITY
End: 2019-06-27

## 2018-02-05 RX ORDER — MIDAZOLAM HYDROCHLORIDE 1 MG/ML
1 INJECTION INTRAMUSCULAR; INTRAVENOUS AS NEEDED
Status: DISCONTINUED | OUTPATIENT
Start: 2018-02-05 | End: 2018-02-06 | Stop reason: HOSPADM

## 2018-02-05 RX ORDER — LIDOCAINE HYDROCHLORIDE 10 MG/ML
1 INJECTION, SOLUTION INFILTRATION; PERINEURAL ONCE AS NEEDED
Status: DISCONTINUED | OUTPATIENT
Start: 2018-02-05 | End: 2018-02-06 | Stop reason: HOSPADM

## 2018-02-05 RX ORDER — SODIUM CHLORIDE 0.9 % (FLUSH) 0.9 %
1-10 SYRINGE (ML) INJECTION AS NEEDED
Status: DISCONTINUED | OUTPATIENT
Start: 2018-02-05 | End: 2018-02-06 | Stop reason: HOSPADM

## 2018-02-05 RX ADMIN — METHYLPREDNISOLONE ACETATE 80 MG: 80 INJECTION, SUSPENSION INTRA-ARTICULAR; INTRALESIONAL; INTRAMUSCULAR; SOFT TISSUE at 08:11

## 2018-02-05 RX ADMIN — IOPAMIDOL 10 ML: 408 INJECTION, SOLUTION INTRATHECAL at 08:11

## 2018-02-05 NOTE — H&P
[facility]    History and Physical    Patient Name: Anoop Montenegro  :  1950  MRN:  [id number]  Date of Admission: 2018    Subjective     Patient is a 67 y.o. male presents with chief complaint of chronic low back and leg: right pain.  He has had one lumbar epidural with a history of multilevel lumbar disc degeneration, facet arthropathy and foraminal compromise worse at L4-5. He noted an initial 50% improvement and is here today for his second epidural. Currently the pain is mostly in the posterior portion of his right leg, so we discussed going at L5-S1.    The following portions of the patients history were reviewed and updated as appropriate: current medications, allergies, past medical history, past surgical history, past family history, past social history and problem list                Objective     Past Medical History:   Past Medical History:   Diagnosis Date   • Allergic rhinitis    • Cervical spondylosis with myelopathy    • FHx: colonic polyps    • GERD (gastroesophageal reflux disease)    • History of colonic polyps    • Hypertension     Benign Essential   • Hypothyroidism    • Insomnia    • Malaise and fatigue    • Pure hypercholesterolemia      Past Surgical History:   Past Surgical History:   Procedure Laterality Date   • COLONOSCOPY N/A 2011   • COLONOSCOPY N/A 2005   • COLONOSCOPY N/A 11/15/2013   • HAND SURGERY Left     3rd finger     Family History:   Family History   Problem Relation Age of Onset   • Hypertension Mother    • Heart failure Mother    • Aortic aneurysm Father    • Prostate cancer Brother    • Heart disease Brother    • Hypertension Brother    • Liver cancer Sister    • Breast cancer Sister      Social History:   Social History   Substance Use Topics   • Smoking status: Never Smoker   • Smokeless tobacco: Never Used   • Alcohol use Yes      Comment: social       Vital Signs Range for the last 24 hours  Temperature: Temp:  [36.6 °C (97.9 °F)] 36.6 °C (97.9  °F)   Temp Source:     BP: BP: (143)/(95) 143/95   Pulse: Heart Rate:  [66] 66   Respirations: Resp:  [16] 16   SPO2: SpO2:  [95 %] 95 %   O2 Amount (l/min):     O2 Devices O2 Device: room air   Weight:           --------------------------------------------------------------------------------    Current Outpatient Prescriptions   Medication Sig Dispense Refill   • atorvastatin (LIPITOR) 20 MG tablet TAKE 1 TABLET BY MOUTH ONE TIME A DAY  30 tablet 3   • irbesartan (AVAPRO) 300 MG tablet TAKE 1 TABLET EVERY DAY 90 tablet 1   • levothyroxine (SYNTHROID, LEVOTHROID) 100 MCG tablet Take 1 tablet by mouth Daily. 90 tablet 1   • meloxicam (MOBIC) 15 MG tablet TAKE 1 TABLET EVERY DAY 90 tablet 1   • Multiple Vitamins-Minerals (MULTIVITAMIN & MINERAL PO) Take 1 tablet by mouth daily.     • zolpidem (AMBIEN) 10 MG tablet Take 1 tablet by mouth At Night As Needed for Sleep. 30 tablet 5   • aspirin 81 MG chewable tablet Chew 81 mg Daily.       No current facility-administered medications for this encounter.        --------------------------------------------------------------------------------  Assessment/Plan      Anesthesia Evaluation            Airway   Mallampati: II  Dental - normal exam     Pulmonary    Cardiovascular     Rate: normal        Neuro/Psych  GI/Hepatic/Renal/Endo      Musculoskeletal     Abdominal    Substance History      OB/GYN          Other                                           Diagnosis and Plan    Treatment Plan  ASA 2   Patient has had previous injection/procedure with 25-50% improvement.   Procedures: Lumbar Epidural Steroid Injection(LESI), With fluoroscopy,       Anesthetic plan and risks discussed with patient.          Diagnosis     * Lumbar degenerative disc disease [M51.36]     * Lumbar spinal stenosis [M48.061]     * Lumbar neuritis [M54.16]

## 2018-02-05 NOTE — ANESTHESIA PROCEDURE NOTES
PAIN Epidural block    Patient location during procedure: pain clinic  Indication:procedure for pain  Performed By  Anesthesiologist: TERESA CHENG  Preanesthetic Checklist  Completed: patient identified, site marked, surgical consent, pre-op evaluation, timeout performed, IV checked, risks and benefits discussed and monitors and equipment checked  Additional Notes  Performed under fluoroscopy  Post-Op Diagnosis Codes:     * Lumbar degenerative disc disease (M51.36)     * Lumbar spinal stenosis (M48.061)     * Lumbar neuritis (M54.16)  Prep:  Pt Position:prone  Sterile Tech:cap, gloves, mask and sterile barrier  Prep:chlorhexidine gluconate and isopropyl alcohol  Monitoring:blood pressure monitoring, continuous pulse oximetry and EKG  Procedure:  Sedation: no   Approach:right paramedian  Guidance: fluoroscopy  Location:lumbar (Intralaminar)  Interspace: L5-S1.  Needle Gauge:20 G  Aspiration:negative  Test Dose:negative  Medications:  Depomedrol:80 mg  Preservative Free Saline:4mL  Isovue:3mL  Comments:Needle placement confirmed with epidural spread of contrast injection.  Post Assessment:  Post-procedure: Bandaid.  Pt Tolerance:patient tolerated the procedure well with no apparent complications  Complications:no

## 2018-02-05 NOTE — PLAN OF CARE
Problem: Pain, Chronic (Adult)  Goal: Acceptable Pain Control/Comfort Level  Outcome: Unable to achieve outcome(s) by discharge Date Met: 02/05/18

## 2018-02-05 NOTE — PLAN OF CARE
Problem: Pain, Chronic (Adult)  Goal: Identify Related Risk Factors and Signs and Symptoms  Outcome: Unable to achieve outcome(s) by discharge Date Met: 02/05/18

## 2018-02-19 RX ORDER — ATORVASTATIN CALCIUM 20 MG/1
20 TABLET, FILM COATED ORAL DAILY
Qty: 30 TABLET | Refills: 3 | Status: SHIPPED | OUTPATIENT
Start: 2018-02-19 | End: 2018-05-16 | Stop reason: SDUPTHER

## 2018-03-01 ENCOUNTER — TELEPHONE (OUTPATIENT)
Dept: ORTHOPEDIC SURGERY | Facility: CLINIC | Age: 68
End: 2018-03-01

## 2018-03-01 NOTE — TELEPHONE ENCOUNTER
Wife called (patient in room). Has had 2 LESI's and little relief. Now has onset of pain shooting down his leg. Please advise. (Supposed to go out of town on 4/1 and will be driving 10 hours)

## 2018-03-02 NOTE — TELEPHONE ENCOUNTER
He might not want to do that.  Not much else to do I can give him tramadol 50 mg every 8 hours for 10 days but explained that it's for acute use only not long-term and that he can taken on the trip with him.  In terms surgery may be needed but this is not the type of operation we would want to rush into.

## 2018-03-20 DIAGNOSIS — E03.9 HYPOTHYROIDISM, UNSPECIFIED TYPE: ICD-10-CM

## 2018-03-20 RX ORDER — LEVOTHYROXINE SODIUM 0.1 MG/1
TABLET ORAL
Qty: 90 TABLET | Refills: 1 | Status: SHIPPED | OUTPATIENT
Start: 2018-03-20 | End: 2018-10-23 | Stop reason: SDUPTHER

## 2018-04-12 ENCOUNTER — APPOINTMENT (OUTPATIENT)
Dept: PAIN MEDICINE | Facility: HOSPITAL | Age: 68
End: 2018-04-12
Attending: ORTHOPAEDIC SURGERY

## 2018-05-02 ENCOUNTER — OFFICE VISIT (OUTPATIENT)
Dept: INTERNAL MEDICINE | Facility: CLINIC | Age: 68
End: 2018-05-02

## 2018-05-02 VITALS
DIASTOLIC BLOOD PRESSURE: 80 MMHG | SYSTOLIC BLOOD PRESSURE: 122 MMHG | OXYGEN SATURATION: 98 % | BODY MASS INDEX: 26.92 KG/M2 | HEIGHT: 70 IN | HEART RATE: 68 BPM | WEIGHT: 188 LBS

## 2018-05-02 DIAGNOSIS — M48.02 CERVICAL STENOSIS OF SPINAL CANAL: Primary | ICD-10-CM

## 2018-05-02 DIAGNOSIS — Z23 NEED FOR HEPATITIS A VACCINATION: ICD-10-CM

## 2018-05-02 DIAGNOSIS — G47.00 INSOMNIA, UNSPECIFIED TYPE: ICD-10-CM

## 2018-05-02 DIAGNOSIS — I10 BENIGN ESSENTIAL HTN: ICD-10-CM

## 2018-05-02 DIAGNOSIS — E03.9 HYPOTHYROIDISM, UNSPECIFIED TYPE: ICD-10-CM

## 2018-05-02 DIAGNOSIS — N52.9 ERECTILE DYSFUNCTION, UNSPECIFIED ERECTILE DYSFUNCTION TYPE: ICD-10-CM

## 2018-05-02 PROCEDURE — 99214 OFFICE O/P EST MOD 30 MIN: CPT | Performed by: NURSE PRACTITIONER

## 2018-05-02 PROCEDURE — 90471 IMMUNIZATION ADMIN: CPT | Performed by: NURSE PRACTITIONER

## 2018-05-02 PROCEDURE — 90632 HEPA VACCINE ADULT IM: CPT | Performed by: NURSE PRACTITIONER

## 2018-05-02 RX ORDER — SILDENAFIL 100 MG/1
100 TABLET, FILM COATED ORAL DAILY PRN
Qty: 10 TABLET | Refills: 3 | Status: SHIPPED | OUTPATIENT
Start: 2018-05-02 | End: 2019-07-04 | Stop reason: HOSPADM

## 2018-05-02 RX ORDER — ZOLPIDEM TARTRATE 10 MG/1
10 TABLET ORAL NIGHTLY PRN
Qty: 30 TABLET | Refills: 3 | Status: SHIPPED | OUTPATIENT
Start: 2018-05-02 | End: 2018-05-03 | Stop reason: SDUPTHER

## 2018-05-03 ENCOUNTER — LAB (OUTPATIENT)
Dept: INTERNAL MEDICINE | Facility: CLINIC | Age: 68
End: 2018-05-03

## 2018-05-03 DIAGNOSIS — I10 BENIGN ESSENTIAL HTN: ICD-10-CM

## 2018-05-03 DIAGNOSIS — E03.9 HYPOTHYROIDISM, UNSPECIFIED TYPE: ICD-10-CM

## 2018-05-03 LAB
ALBUMIN SERPL-MCNC: 4.4 G/DL (ref 3.5–5.2)
ALBUMIN/GLOB SERPL: 2.2 G/DL
ALP SERPL-CCNC: 62 U/L (ref 39–117)
ALT SERPL-CCNC: 21 U/L (ref 1–41)
AST SERPL-CCNC: 21 U/L (ref 1–40)
BASOPHILS # BLD AUTO: 0.03 10*3/MM3 (ref 0–0.2)
BASOPHILS NFR BLD AUTO: 0.5 % (ref 0–1.5)
BILIRUB SERPL-MCNC: 0.5 MG/DL (ref 0.1–1.2)
BUN SERPL-MCNC: 18 MG/DL (ref 8–23)
BUN/CREAT SERPL: 15 (ref 7–25)
CALCIUM SERPL-MCNC: 9.5 MG/DL (ref 8.6–10.5)
CHLORIDE SERPL-SCNC: 103 MMOL/L (ref 98–107)
CHOLEST SERPL-MCNC: 124 MG/DL (ref 0–200)
CO2 SERPL-SCNC: 28.5 MMOL/L (ref 22–29)
CREAT SERPL-MCNC: 1.2 MG/DL (ref 0.76–1.27)
EOSINOPHIL # BLD AUTO: 0.29 10*3/MM3 (ref 0–0.7)
EOSINOPHIL # BLD AUTO: 4.9 % (ref 0.3–6.2)
ERYTHROCYTE [DISTWIDTH] IN BLOOD BY AUTOMATED COUNT: 13.9 % (ref 11.5–14.5)
GLOBULIN SER CALC-MCNC: 2 GM/DL
GLUCOSE SERPL-MCNC: 88 MG/DL (ref 65–99)
HCT VFR BLD AUTO: 44.4 % (ref 40.4–52.2)
HDLC SERPL-MCNC: 50 MG/DL (ref 40–60)
HGB BLD-MCNC: 14.8 G/DL (ref 13.7–17.6)
IMM GRANULOCYTES # BLD: 0.01 10*3/MM3 (ref 0–0.03)
IMM GRANULOCYTES NFR BLD: 0.2 % (ref 0–0.5)
LDLC SERPL CALC-MCNC: 61 MG/DL (ref 0–100)
LYMPHOCYTES # BLD AUTO: 2.25 10*3/MM3 (ref 0.9–4.8)
LYMPHOCYTES NFR BLD AUTO: 38.2 % (ref 19.6–45.3)
MCH RBC QN AUTO: 32.5 PG (ref 27–32.7)
MCHC RBC AUTO-ENTMCNC: 33.3 G/DL (ref 32.6–36.4)
MCV RBC AUTO: 97.6 FL (ref 79.8–96.2)
MONOCYTES # BLD AUTO: 0.4 10*3/MM3 (ref 0.2–1.2)
MONOCYTES NFR BLD AUTO: 6.8 % (ref 5–12)
NEUTROPHILS # BLD AUTO: 2.92 10*3/MM3 (ref 1.9–8.1)
NEUTROPHILS NFR BLD AUTO: 49.6 % (ref 42.7–76)
PLATELET # BLD AUTO: 301 10*3/MM3 (ref 140–500)
POTASSIUM SERPL-SCNC: 4.6 MMOL/L (ref 3.5–5.2)
PROT SERPL-MCNC: 6.4 G/DL (ref 6–8.5)
RBC # BLD AUTO: 4.55 10*6/MM3 (ref 4.6–6)
SODIUM SERPL-SCNC: 143 MMOL/L (ref 136–145)
TRIGL SERPL-MCNC: 67 MG/DL (ref 0–150)
TSH SERPL-ACNC: 1.03 MIU/ML (ref 0.27–4.2)
VLDLC SERPL-MCNC: 13.4 MG/DL (ref 5–40)
WBC # BLD AUTO: 5.89 10*3/MM3 (ref 4.5–10.7)

## 2018-05-03 RX ORDER — ZOLPIDEM TARTRATE 10 MG/1
10 TABLET ORAL NIGHTLY PRN
Qty: 90 TABLET | Refills: 1 | Status: SHIPPED | OUTPATIENT
Start: 2018-05-03 | End: 2019-06-30

## 2018-05-03 NOTE — PROGRESS NOTES
Subjective   Anoop Montenegro is a 67 y.o. male who presents for f/u regarding hypothyroidism, HTN and chronic neck pain.    He c/o worsening neck pain with intermittent numbness/tingling of the right arm (often awakens him at night). He has a known hx of DJD (MRI in 2014) and has been evaluated by neurosurgery in the past. He has had epidurals in the lumbar spine with improvement in sx.  His BP has been elevated in the office recently, he has monitored at home and readings are consistently 130s/70-80s.      Hypertension   This is a recurrent problem. The current episode started more than 1 year ago. The problem has been waxing and waning since onset. The problem is controlled. Associated symptoms include malaise/fatigue and neck pain. Pertinent negatives include no anxiety, blurred vision, chest pain, headaches, orthopnea, palpitations, peripheral edema, PND, shortness of breath or sweats. There are no associated agents to hypertension. Risk factors for coronary artery disease include dyslipidemia and family history. There are no compliance problems.    Arm Pain    Associated symptoms include numbness. Pertinent negatives include no chest pain.        The following portions of the patient's history were reviewed and updated as appropriate: allergies, current medications, past social history and problem list.    Past Medical History:   Diagnosis Date   • Allergic rhinitis    • Cervical spondylosis with myelopathy    • FHx: colonic polyps    • GERD (gastroesophageal reflux disease)    • History of colonic polyps    • Hypertension     Benign Essential   • Hypothyroidism    • Insomnia    • Malaise and fatigue    • Pure hypercholesterolemia          Current Outpatient Prescriptions:   •  aspirin 81 MG chewable tablet, Chew 81 mg Daily., Disp: , Rfl:   •  atorvastatin (LIPITOR) 20 MG tablet, Take 1 tablet by mouth Daily., Disp: 30 tablet, Rfl: 3  •  irbesartan (AVAPRO) 300 MG tablet, TAKE 1 TABLET EVERY DAY, Disp: 90  "tablet, Rfl: 1  •  levothyroxine (SYNTHROID, LEVOTHROID) 100 MCG tablet, TAKE 1 TABLET EVERY DAY, Disp: 90 tablet, Rfl: 1  •  meloxicam (MOBIC) 15 MG tablet, TAKE 1 TABLET EVERY DAY, Disp: 90 tablet, Rfl: 1  •  Multiple Vitamins-Minerals (MULTIVITAMIN & MINERAL PO), Take 1 tablet by mouth daily., Disp: , Rfl:   •  zolpidem (AMBIEN) 10 MG tablet, Take 1 tablet by mouth At Night As Needed for Sleep., Disp: 90 tablet, Rfl: 1  •  sildenafil (VIAGRA) 100 MG tablet, Take 1 tablet by mouth Daily As Needed for erectile dysfunction., Disp: 10 tablet, Rfl: 3    No Known Allergies    Review of Systems   Constitutional: Positive for malaise/fatigue. Negative for chills, fatigue, fever and unexpected weight change.   HENT: Negative for congestion, ear pain, postnasal drip, sinus pressure, sore throat and trouble swallowing.    Eyes: Negative for blurred vision and visual disturbance.   Respiratory: Negative for cough, chest tightness, shortness of breath and wheezing.    Cardiovascular: Negative for chest pain, palpitations, orthopnea, leg swelling and PND.   Gastrointestinal: Negative for abdominal pain, blood in stool, nausea and vomiting.   Genitourinary: Negative for dysuria, frequency and urgency.   Musculoskeletal: Positive for myalgias, neck pain and neck stiffness. Negative for arthralgias and joint swelling.   Skin: Negative for color change.   Neurological: Positive for numbness. Negative for syncope, weakness and headaches.   Hematological: Does not bruise/bleed easily.   Psychiatric/Behavioral: Positive for sleep disturbance (chronic, however worse due to increased pain of right arm).       Objective   Vitals:    05/02/18 0947   BP: 122/80   BP Location: Left arm   Patient Position: Sitting   Cuff Size: Adult   Pulse: 68   SpO2: 98%   Weight: 85.3 kg (188 lb)   Height: 177.8 cm (70\")     Physical Exam   Constitutional: He appears well-developed and well-nourished. He is cooperative. He does not have a sickly " appearance. He does not appear ill.   HENT:   Head: Normocephalic.   Right Ear: Hearing, tympanic membrane and external ear normal. No drainage, swelling or tenderness. No mastoid tenderness. Tympanic membrane is not injected, not scarred, not erythematous and not bulging. Tympanic membrane mobility is normal. No middle ear effusion. No decreased hearing is noted.   Left Ear: Hearing, tympanic membrane and external ear normal.   Nose: Nose normal. No mucosal edema, rhinorrhea, sinus tenderness or nasal deformity. Right sinus exhibits no maxillary sinus tenderness and no frontal sinus tenderness. Left sinus exhibits no maxillary sinus tenderness and no frontal sinus tenderness.   Mouth/Throat: Oropharynx is clear and moist and mucous membranes are normal. Normal dentition.   Eyes: Conjunctivae and lids are normal. Pupils are equal, round, and reactive to light. Right eye exhibits no discharge and no exudate. Left eye exhibits no discharge and no exudate.   Neck: Trachea normal and normal range of motion. Carotid bruit is not present. No edema present. No thyroid mass and no thyromegaly present.   Cardiovascular: Regular rhythm, normal heart sounds and normal pulses.    No murmur heard.  Pulmonary/Chest: Breath sounds normal. No respiratory distress. He has no decreased breath sounds. He has no wheezes. He has no rhonchi. He has no rales.   Lymphadenopathy:        Head (right side): No submental, no submandibular, no tonsillar, no preauricular, no posterior auricular and no occipital adenopathy present.        Head (left side): No submental, no submandibular, no tonsillar, no preauricular, no posterior auricular and no occipital adenopathy present.   Neurological: He is alert.   Skin: Skin is warm, dry and intact. No cyanosis. Nails show no clubbing.       Assessment/Plan   Anoop was seen today for hypertension and arm pain.    Diagnoses and all orders for this visit:    Cervical stenosis of spinal  canal  Comments:  will send for MRI to evaluate worsening sx  Orders:  -     MRI Cervical Spine Without Contrast; Future    Insomnia, unspecified type  Comments:  takes Ambien only as needed  Orders:  -     Discontinue: zolpidem (AMBIEN) 10 MG tablet; Take 1 tablet by mouth At Night As Needed for Sleep.  -     zolpidem (AMBIEN) 10 MG tablet; Take 1 tablet by mouth At Night As Needed for Sleep.    Benign essential HTN  Comments:  stable on current meds  Orders:  -     CBC Auto Differential; Future  -     Comprehensive Metabolic Panel; Future  -     Lipid Panel; Future    Erectile dysfunction, unspecified erectile dysfunction type  -     sildenafil (VIAGRA) 100 MG tablet; Take 1 tablet by mouth Daily As Needed for erectile dysfunction.    Hypothyroidism, unspecified type  -     TSH; Future

## 2018-05-16 RX ORDER — ATORVASTATIN CALCIUM 20 MG/1
TABLET, FILM COATED ORAL
Qty: 90 TABLET | Refills: 2 | Status: SHIPPED | OUTPATIENT
Start: 2018-05-16 | End: 2019-02-18 | Stop reason: SDUPTHER

## 2018-06-08 NOTE — PROGRESS NOTES
Subjective   Patient ID: Anoop Montenegro is a 67 y.o. male is being seen for consultation today at the request of Jimbo Hurley MD for bilateral arm pain. He complain of numbness and aching in Bilateral arms. He is a previous patient of Dr. Breaux for cervical pain.     Mr. Montenegro is being seen today for neurosurgical opinion at the request of Dr. Jimbo Hurley.  Notes from today's visit will be forwarded upon completion.  The patient is complaining of worsening cervical pain and bilateral shoulder and arm pain right greater than left.  There is also associated tingling and numbness.  These symptoms occur in the middle of the night usually after getting about 4 hours of sleep.  The symptoms have been unrelenting.  He also finds that he has increased episodes after performing just minor activities such as volunteer work.  The pain is so severe that it awakens him from sleep.  He usually has to go to the recliner in order to rest for the remainder of the night.  He is also noticed some increased episodes of stumbling and gait difficulty.  He states that he saw Dr. Breaux several years ago for worsening neck pain and headaches.  The arm pain symptoms have been going on for the last 2 years but have become much more severe over the last 2-3 months.      Arm Pain    The incident occurred more than 1 week ago (2 years ago). There was no injury mechanism. The pain is present in the left clavicle, left elbow, left forearm, left shoulder, left fingers, right clavicle, right elbow, right fingers, right shoulder, right forearm, upper left arm and upper right arm. The quality of the pain is described as aching. The pain radiates to the back, left neck and right neck. The pain is at a severity of 8/10. The pain is moderate. The pain has been intermittent since the incident. Associated symptoms include numbness and tingling. Pertinent negatives include no chest pain. Associated symptoms comments: Stiffness in neck.  The symptoms are aggravated by movement. He has tried nothing for the symptoms.   Neurologic Problem   The patient's primary symptoms include focal sensory loss and focal weakness. The patient's pertinent negatives include no altered mental status, clumsiness, loss of balance, near-syncope, syncope, visual change or weakness. This is a recurrent problem. The current episode started more than 1 year ago. The neurological problem developed gradually. The problem has been waxing and waning since onset. There was right-sided, lower extremity and upper extremity focality noted. Associated symptoms include back pain, fatigue and neck pain. Pertinent negatives include no abdominal pain, auditory change, aura, bladder incontinence, bowel incontinence, chest pain, confusion, diaphoresis, dizziness, fever, headaches, light-headedness, nausea, palpitations, shortness of breath, vertigo or vomiting. Past treatments include medication and position change. The treatment provided no relief.       The following portions of the patient's history were reviewed and updated as appropriate: allergies, current medications, past family history, past medical history, past social history, past surgical history and problem list.    Review of Systems   Constitutional: Positive for fatigue. Negative for diaphoresis and fever.   Respiratory: Negative for chest tightness and shortness of breath.    Cardiovascular: Negative for chest pain, palpitations and near-syncope.   Gastrointestinal: Negative for abdominal pain, bowel incontinence, nausea and vomiting.   Genitourinary: Negative for bladder incontinence.   Musculoskeletal: Positive for arthralgias, back pain, gait problem and neck pain.   Neurological: Positive for tingling, focal weakness and numbness. Negative for dizziness, vertigo, syncope, weakness, light-headedness, headaches and loss of balance.   Psychiatric/Behavioral: Negative for confusion.   All other systems reviewed and are  negative.      Objective   Physical Exam   Constitutional: He is oriented to person, place, and time. He appears well-developed and well-nourished. He is cooperative. No distress.   HENT:   Head: Normocephalic and atraumatic.   Eyes: Conjunctivae are normal.   Neck: Normal range of motion. Neck supple.   Cardiovascular: Normal rate.    Pulmonary/Chest: Effort normal. No respiratory distress.   Abdominal: Soft. He exhibits no distension. There is no tenderness.   Musculoskeletal: Normal range of motion. He exhibits no edema.   Neurological: He is alert and oriented to person, place, and time. He has normal strength. He displays normal reflexes. No sensory deficit. He exhibits normal muscle tone. Coordination normal. GCS eye subscore is 4. GCS verbal subscore is 5. GCS motor subscore is 6.   No motor or sensory deficits on today's exam.  DTRs are normal throughout.  Negative Marcano's; negative clonus.  Positive Spurling's on the right.  Negative Phalen's.  Negative Tinel's.   Skin: Skin is warm and dry. No rash noted. He is not diaphoretic.   Psychiatric: He has a normal mood and affect. Thought content normal.   Vitals reviewed.      Assessment/Plan   Independent Review of Radiographic Studies:      No radiographic images to review regarding the cervical spine.    Medical Decision Making:      Mr. Montenegro was seen today for the above complaints.  Given that there has been such a severe progression in symptoms, I have recommended an MRI of the cervical spine without contrast as well as an EMG/NCS of both upper extremities.  I will see Mr. Montenegro back in the office thereafter.     Anoop was seen today for arm pain.    Diagnoses and all orders for this visit:    Numbness and tingling  -     MRI Cervical Spine Without Contrast; Future  -     EMG both arms; Future  -     Nerve Conduction Test both arms; Future    Cervical radiculitis  -     MRI Cervical Spine Without Contrast; Future  -     EMG both arms; Future  -      Nerve Conduction Test both arms; Future      Return for after radiographic imaging with me.

## 2018-06-13 ENCOUNTER — OFFICE VISIT (OUTPATIENT)
Dept: NEUROSURGERY | Facility: CLINIC | Age: 68
End: 2018-06-13

## 2018-06-13 VITALS
SYSTOLIC BLOOD PRESSURE: 146 MMHG | HEART RATE: 60 BPM | HEIGHT: 70 IN | BODY MASS INDEX: 27.4 KG/M2 | DIASTOLIC BLOOD PRESSURE: 94 MMHG | WEIGHT: 191.4 LBS

## 2018-06-13 DIAGNOSIS — R20.2 NUMBNESS AND TINGLING: Primary | ICD-10-CM

## 2018-06-13 DIAGNOSIS — M54.12 CERVICAL RADICULITIS: ICD-10-CM

## 2018-06-13 DIAGNOSIS — R20.0 NUMBNESS AND TINGLING: Primary | ICD-10-CM

## 2018-06-13 PROCEDURE — 99204 OFFICE O/P NEW MOD 45 MIN: CPT | Performed by: NURSE PRACTITIONER

## 2018-06-25 ENCOUNTER — HOSPITAL ENCOUNTER (OUTPATIENT)
Dept: INFUSION THERAPY | Facility: HOSPITAL | Age: 68
Discharge: HOME | End: 2018-06-25
Attending: PSYCHIATRY & NEUROLOGY | Admitting: PSYCHIATRY & NEUROLOGY

## 2018-06-25 ENCOUNTER — HOSPITAL ENCOUNTER (OUTPATIENT)
Dept: MRI IMAGING | Facility: HOSPITAL | Age: 68
Discharge: HOME | End: 2018-06-25

## 2018-06-25 DIAGNOSIS — M54.12 CERVICAL RADICULITIS: ICD-10-CM

## 2018-06-25 DIAGNOSIS — R20.0 NUMBNESS AND TINGLING: ICD-10-CM

## 2018-06-25 DIAGNOSIS — R20.2 NUMBNESS AND TINGLING: ICD-10-CM

## 2018-06-25 PROCEDURE — 95886 MUSC TEST DONE W/N TEST COMP: CPT | Performed by: PSYCHIATRY & NEUROLOGY

## 2018-06-25 PROCEDURE — 72141 MRI NECK SPINE W/O DYE: CPT

## 2018-06-25 PROCEDURE — 95911 NRV CNDJ TEST 9-10 STUDIES: CPT

## 2018-06-25 PROCEDURE — 95911 NRV CNDJ TEST 9-10 STUDIES: CPT | Performed by: PSYCHIATRY & NEUROLOGY

## 2018-06-25 PROCEDURE — 95886 MUSC TEST DONE W/N TEST COMP: CPT

## 2018-06-25 RX ORDER — MELOXICAM 15 MG/1
TABLET ORAL
Qty: 90 TABLET | Refills: 1 | Status: SHIPPED | OUTPATIENT
Start: 2018-06-25 | End: 2019-06-25 | Stop reason: SDUPTHER

## 2018-06-25 NOTE — PROCEDURES
EMG and Nerve Conduction Studies    Please see data sheets for details on methods, temperatures and lab standards. EMG muscles tested for upper extremity studies include the deltoid, biceps, triceps, pronator teres, extensor digitorum communis, first dorsal interosseous and abductor pollicis brevis.  EMG muscles tested for lower extremity studies include the vastus lateralis, tibialis anterior, peroneus longus, medial gastrocnemius and extensor digitorum brevis.  Additional muscles tested as needed.  Paraspinal muscles tested as needed.  The complete report includes the data sheets.    Indication: Neck pain radiating down the arms right much more than left.  Bilateral numbness tingling in hands waking from sleep more on the right  History: 67-year-old male with long-standing neck pain with some radiation of pain in the arms but generally improvements but the last 2 months this has been fairly persistent especially on the right.  He also describes long-standing numbness tingling in his hands which he may wake from sleep with as well as may occur while driving his car.      Ht: 177.8 cm  Wt: 86.8 kg  HbA1C: No results found for: HGBA1C  TSH:   Lab Results   Component Value Date    TSH 1.030 05/03/2018       Technical summary:  Nerve conduction studies were obtained in both arms.  The hands were very cold prior to study and they were warmed in the sink but again cold off quickly and temperature correction factors were needed on some of the distal latencies.  Needle examination was obtained on selected muscles in both arms.    Results:  1.  Severely prolonged left median sensory latency with temperature correction at 4.8 ms with low amplitude of 18.7 µV.  Severely prolonged right median sensory latency at 5.9 ms with low amplitude of 7.6 µV.  2.  Prolonged left ulnar sensory latency at 4.2 ms with temperature correction.  The amplitude was normal.  Prolonged right ulnar sensory latency at 4.3 ms with normal  amplitude.  3.  Normal right radial sensory study.  4.  Moderately prolonged left median motor latency with temperature correction at 5.1 ms with mildly slow velocity of 47.5 m/s.  The amplitudes were normal.  Severely prolonged right median motor latency with temperature correction at 6.1 ms with artificially fast conduction velocity based on a dip prior to the negative deflection from elbow stimulation.  This suggested a Neftali-Renay connection and a crossover test was done confirming this.  5.  Slow left ulnar motor conduction velocity in the short segment across the elbow at 32.3 m/s with normal velocity below the elbow.  Normal distal latency and amplitudes.  Slow right ulnar motor conduction velocity in the short segment across the elbow at 44.1 m/s with normal velocity below the elbow.  Normal distal latency and amplitudes.  6.  Needle examination of selected muscles in both arms showed normal insertional activities throughout.  There were a few large motor units in the triceps on the right normal on the left.  Recruitment appeared normal.  The abductor pollicis brevis and first dorsal interosseous showed a mild increased number of large motor units with increased firing rates and reduced interference patterns bilaterally.  All other muscles tested showed normal motor units and recruitment.  Cervical paraspinals at C7 showed some irritability on the right but no clear-cut fibrillations or positive sharp waves.  A single fibrillation potential seen on the left.    Impression:  Abnormal study showing bilateral median neuropathies at the wrists, severe on the right and moderate on the left.  There is a Neftali-Renay connection (median to ulnar connection) on the right which is a normal variant.  In addition there are moderate bilateral ulnar neuropathies at the elbows.  This combination is consistent with a more diffuse peripheral neuropathy.  There is some equivocal evidence of a cervical radiculopathy on each  side around C7 but the evidence on the study is not conclusive.  Clinical correlation suggested.  Study results were discussed with the patient.    Steven Geronimo M.D.              Dictated utilizing Dragon dictation.

## 2018-07-11 NOTE — PROGRESS NOTES
Subjective   Patient ID: Anoop Montenegro is a 67 y.o. male is here today for follow-up on bilateral arm pain after having an cervical MRI and nerve conduction test. The patients arm pain has improved but now he is complaining of lumbar pain.    Mr. Montenegro returns to the office today for follow-up with a new cervical MRI and EMG of both upper extremities.  He reports continued bilateral trapezius and some right scapular pain.  There is no radiating arm pain.  He also has episodes of intermittent low back pain and right leg pain.  He states that lately the lumbar symptoms have gotten worse.  He is currently undergoing a lumbar epidural injections.      Arm Pain    The incident occurred more than 1 week ago (2 years ago). There was no injury mechanism. The pain is present in the left elbow, left forearm, left fingers, right clavicle, right elbow, right fingers, right forearm, left clavicle, left shoulder, right shoulder, upper left arm and upper right arm. The quality of the pain is described as aching. The pain radiates to the back, left neck and right neck. The pain is at a severity of 2/10. The pain is mild. The pain has been fluctuating since the incident. Pertinent negatives include no chest pain, muscle weakness, numbness or tingling. Associated symptoms comments: Neck stiffness. The symptoms are aggravated by movement. He has tried nothing for the symptoms.   Back Pain   This is a recurrent problem. The problem occurs intermittently. The problem has been gradually worsening since onset. The pain is present in the lumbar spine. The quality of the pain is described as aching, cramping, shooting and stabbing. The pain radiates to the right foot, right knee and right thigh. The pain is at a severity of 6/10. The pain is moderate. The symptoms are aggravated by sitting. Associated symptoms include leg pain (right leg). Pertinent negatives include no abdominal pain, bladder incontinence, bowel incontinence, chest  pain, numbness or tingling.       The following portions of the patient's history were reviewed and updated as appropriate: allergies, current medications, past family history, past medical history, past social history, past surgical history and problem list.    Review of Systems   Respiratory: Negative for chest tightness and shortness of breath.    Cardiovascular: Negative for chest pain.   Gastrointestinal: Negative for abdominal pain and bowel incontinence.   Genitourinary: Negative for bladder incontinence.   Musculoskeletal: Positive for arthralgias, back pain, neck pain and neck stiffness.   Neurological: Positive for dizziness. Negative for tingling and numbness.   All other systems reviewed and are negative.      Objective   Physical Exam   Constitutional: He is oriented to person, place, and time. He appears well-developed and well-nourished. He is cooperative. No distress.   HENT:   Head: Normocephalic and atraumatic.   Eyes: Pupils are equal, round, and reactive to light. Conjunctivae and EOM are normal.   Neck: Normal range of motion. Neck supple.   Cardiovascular: Normal rate.    Pulmonary/Chest: Effort normal. No respiratory distress.   Abdominal: Soft. He exhibits no distension. There is no tenderness.   Musculoskeletal: Normal range of motion. He exhibits no edema.   Neurological: He is alert and oriented to person, place, and time. He has normal strength. He displays normal reflexes. No sensory deficit. He exhibits normal muscle tone. Coordination normal. GCS eye subscore is 4. GCS verbal subscore is 5. GCS motor subscore is 6.   No motor or sensory deficits on exam.  DTRs are normal throughout.  Negative Marcano's; negative clonus.  Negative Spurling's bilaterally.  Negative straight leg raise bilaterally.   Skin: Skin is warm and dry. No rash noted. He is not diaphoretic.   Psychiatric: He has a normal mood and affect. Thought content normal.   Vitals reviewed.    Neurologic Exam     Mental  Status   Oriented to person, place, and time.     Cranial Nerves     CN III, IV, VI   Pupils are equal, round, and reactive to light.  Extraocular motions are normal.     Motor Exam     Strength   Strength 5/5 throughout.       Assessment/Plan   Independent Review of Radiographic Studies:      I have independently reviewed the cervical MRI images dated June 25, 2018 today in the office.  The cervical MRI shows multilevel degenerative changes with central canal narrowing at C3-4, C4-5 and C6-7.  There is severe bilateral foraminal narrowing at C6-7.  There was no evidence of abnormal cord signal.    EMG/NCS was reviewed.  The study was abnormal revealing bilateral median neuropathies at the wrists right greater than left.  There were also moderate bilateral foraminal neuropathies noted at the elbows.  This was thought to be more relative to a diffuse peripheral neuropathy rather than cervical radiculopathy.    Medical Decision Making:      Mr. Montenegro returns to the office today for reevaluation.  He primarily complains of neck pain with some radiation into the trapezius region and right scapular region.  For the most part he has no arm pain.  I reviewed the EMG and cervical MRI results with him.  The patient is not interested in any surgical measures at this time.  He is are getting epidural injections for his low back and therefore cervical epidural injections are not really an option.    Mr. Vieira would like to try some physical therapy and mild traction.  He was offered a follow-up appointment but he preferred to just call us if his symptoms do not improve.  We will keep it open ended from here.    Anoop was seen today for arm pain.    Diagnoses and all orders for this visit:    Lumbar disc disease with radiculopathy  -     Ambulatory Referral to Physical Therapy Aquatics, Evaluate and treat; Heat (cervical traction ), Electrotherapy; E-stim; (try dry needling)    Cervical disc disorder at C6-C7 level with  radiculopathy  -     Ambulatory Referral to Physical Therapy Aquatics, Evaluate and treat; Heat (cervical traction ), Electrotherapy; E-stim; (try dry needling)      Return if symptoms worsen or fail to improve.

## 2018-07-19 ENCOUNTER — OFFICE VISIT (OUTPATIENT)
Dept: NEUROSURGERY | Facility: CLINIC | Age: 68
End: 2018-07-19

## 2018-07-19 VITALS
HEART RATE: 61 BPM | HEIGHT: 70 IN | BODY MASS INDEX: 27.35 KG/M2 | DIASTOLIC BLOOD PRESSURE: 93 MMHG | SYSTOLIC BLOOD PRESSURE: 142 MMHG | WEIGHT: 191 LBS

## 2018-07-19 DIAGNOSIS — M51.16 LUMBAR DISC DISEASE WITH RADICULOPATHY: Primary | ICD-10-CM

## 2018-07-19 DIAGNOSIS — M50.123 CERVICAL DISC DISORDER AT C6-C7 LEVEL WITH RADICULOPATHY: ICD-10-CM

## 2018-07-19 PROCEDURE — 99213 OFFICE O/P EST LOW 20 MIN: CPT | Performed by: NURSE PRACTITIONER

## 2018-10-23 DIAGNOSIS — E03.9 HYPOTHYROIDISM, UNSPECIFIED TYPE: ICD-10-CM

## 2018-10-23 RX ORDER — IRBESARTAN 300 MG/1
TABLET ORAL
Qty: 90 TABLET | Refills: 1 | Status: SHIPPED | OUTPATIENT
Start: 2018-10-23 | End: 2019-01-28 | Stop reason: SDUPTHER

## 2018-10-23 RX ORDER — LEVOTHYROXINE SODIUM 0.1 MG/1
TABLET ORAL
Qty: 90 TABLET | Refills: 1 | Status: SHIPPED | OUTPATIENT
Start: 2018-10-23 | End: 2019-01-28 | Stop reason: SDUPTHER

## 2018-11-02 ENCOUNTER — TELEPHONE (OUTPATIENT)
Dept: INTERNAL MEDICINE | Facility: CLINIC | Age: 68
End: 2018-11-02

## 2018-11-02 ENCOUNTER — OFFICE VISIT (OUTPATIENT)
Dept: INTERNAL MEDICINE | Facility: CLINIC | Age: 68
End: 2018-11-02

## 2018-11-02 VITALS
OXYGEN SATURATION: 98 % | HEART RATE: 65 BPM | WEIGHT: 192 LBS | DIASTOLIC BLOOD PRESSURE: 100 MMHG | HEIGHT: 70 IN | SYSTOLIC BLOOD PRESSURE: 130 MMHG | BODY MASS INDEX: 27.49 KG/M2

## 2018-11-02 DIAGNOSIS — I10 BENIGN ESSENTIAL HTN: Primary | ICD-10-CM

## 2018-11-02 DIAGNOSIS — E78.01 FAMILIAL HYPERCHOLESTEROLEMIA: ICD-10-CM

## 2018-11-02 DIAGNOSIS — M50.30 DDD (DEGENERATIVE DISC DISEASE), CERVICAL: ICD-10-CM

## 2018-11-02 DIAGNOSIS — E03.9 ACQUIRED HYPOTHYROIDISM: ICD-10-CM

## 2018-11-02 DIAGNOSIS — M48.02 CERVICAL SPINAL STENOSIS: ICD-10-CM

## 2018-11-02 PROCEDURE — 99214 OFFICE O/P EST MOD 30 MIN: CPT | Performed by: INTERNAL MEDICINE

## 2018-11-02 NOTE — PROGRESS NOTES
Subjective   Anoop Montenegro is a 68 y.o. male. With a chief complaint of HTN, CDD, chronic low back, hypothyroidism, Hyperlipidemia here for follow up and review.    History of Present Illness     The following portions of the patient's history were reviewed and updated as appropriate: allergies, current medications, past family history, past medical history, past social history, past surgical history and problem list.    Review of Systems   Constitutional: Positive for fatigue.   HENT: Negative.    Eyes: Negative.    Respiratory: Negative.    Cardiovascular: Negative.    Gastrointestinal: Negative.    Endocrine: Negative.    Genitourinary: Negative.    Musculoskeletal: Positive for arthralgias, back pain and neck pain.   Skin: Negative.    Allergic/Immunologic: Negative.    Neurological: Negative.    Hematological: Negative.    Psychiatric/Behavioral: Negative.        Objective   Physical Exam   Constitutional: He is oriented to person, place, and time. He appears well-developed and well-nourished.   HENT:   Head: Normocephalic and atraumatic.   Eyes: Pupils are equal, round, and reactive to light. EOM are normal.   Neck: Normal range of motion.   Some discomfort with ROM   Cardiovascular: Normal rate, regular rhythm and normal heart sounds.    Pulmonary/Chest: Effort normal and breath sounds normal.   Abdominal: Soft. Bowel sounds are normal.   Musculoskeletal:   Chronic low back discomfort   Neurological: He is alert and oriented to person, place, and time.   Skin: Skin is warm and dry.   Psychiatric: He has a normal mood and affect. His behavior is normal. Judgment and thought content normal.   Nursing note and vitals reviewed.        Assessment/Plan   Anoop was seen today for hyperlipidemia, hypertension, hypothyroidism and insomnia.    Diagnoses and all orders for this visit:    Benign essential HTN  Comments:  will adjust medications accordingly  Orders:  -     Comprehensive Metabolic Panel; Future  -      CBC & Differential; Future    DDD (degenerative disc disease), cervical  Comments:  doing better post PT    Cervical spinal stenosis  Comments:  stable for now    Acquired hypothyroidism  Comments:  will check a TSH  Orders:  -     TSH; Future    Familial hypercholesterolemia  Comments:  will check lipids today  Orders:  -     Lipid Panel; Future

## 2018-11-02 NOTE — TELEPHONE ENCOUNTER
----- Message from Martha Pérez sent at 11/2/2018  1:37 PM EDT -----  Contact: Patient  Patient calling states Dr. STEIN was going to give him a prescription for the shingles shot. Would like it mailed to him. Please advise    Patient:[phone]

## 2018-11-02 NOTE — TELEPHONE ENCOUNTER
Informed patient he doesn't need a rx to receive the shingles vaccine at the pharmacy.  It is filed under their rx coverage.

## 2018-11-06 ENCOUNTER — LAB (OUTPATIENT)
Dept: INTERNAL MEDICINE | Facility: CLINIC | Age: 68
End: 2018-11-06

## 2018-11-06 DIAGNOSIS — E78.01 FAMILIAL HYPERCHOLESTEROLEMIA: ICD-10-CM

## 2018-11-06 DIAGNOSIS — I10 BENIGN ESSENTIAL HTN: ICD-10-CM

## 2018-11-06 DIAGNOSIS — E03.9 ACQUIRED HYPOTHYROIDISM: ICD-10-CM

## 2018-11-06 LAB
ALBUMIN SERPL-MCNC: 4.2 G/DL (ref 3.5–5.2)
ALBUMIN/GLOB SERPL: 1.4 G/DL
ALP SERPL-CCNC: 70 U/L (ref 39–117)
ALT SERPL-CCNC: 22 U/L (ref 1–41)
AST SERPL-CCNC: 14 U/L (ref 1–40)
BASOPHILS # BLD AUTO: 0.04 10*3/MM3 (ref 0–0.2)
BASOPHILS NFR BLD AUTO: 0.8 % (ref 0–1.5)
BILIRUB SERPL-MCNC: 0.5 MG/DL (ref 0.1–1.2)
BUN SERPL-MCNC: 19 MG/DL (ref 8–23)
BUN/CREAT SERPL: 14.8 (ref 7–25)
CALCIUM SERPL-MCNC: 9.6 MG/DL (ref 8.6–10.5)
CHLORIDE SERPL-SCNC: 103 MMOL/L (ref 98–107)
CHOLEST SERPL-MCNC: 122 MG/DL (ref 0–200)
CO2 SERPL-SCNC: 28 MMOL/L (ref 22–29)
CREAT SERPL-MCNC: 1.28 MG/DL (ref 0.76–1.27)
EOSINOPHIL # BLD AUTO: 0.19 10*3/MM3 (ref 0–0.7)
EOSINOPHIL # BLD AUTO: 3.7 % (ref 0.3–6.2)
ERYTHROCYTE [DISTWIDTH] IN BLOOD BY AUTOMATED COUNT: 14.2 % (ref 11.5–14.5)
GLOBULIN SER CALC-MCNC: 3 GM/DL
GLUCOSE SERPL-MCNC: 88 MG/DL (ref 65–99)
HCT VFR BLD AUTO: 47.1 % (ref 40.4–52.2)
HDLC SERPL-MCNC: 49 MG/DL (ref 40–60)
HGB BLD-MCNC: 15.3 G/DL (ref 13.7–17.6)
IMM GRANULOCYTES # BLD: 0 10*3/MM3 (ref 0–0.03)
IMM GRANULOCYTES NFR BLD: 0 % (ref 0–0.5)
LDLC SERPL CALC-MCNC: 65 MG/DL (ref 0–100)
LYMPHOCYTES # BLD AUTO: 2.38 10*3/MM3 (ref 0.9–4.8)
LYMPHOCYTES NFR BLD AUTO: 46.8 % (ref 19.6–45.3)
MCH RBC QN AUTO: 31.5 PG (ref 27–32.7)
MCHC RBC AUTO-ENTMCNC: 32.5 G/DL (ref 32.6–36.4)
MCV RBC AUTO: 96.9 FL (ref 79.8–96.2)
MONOCYTES # BLD AUTO: 0.29 10*3/MM3 (ref 0.2–1.2)
MONOCYTES NFR BLD AUTO: 5.7 % (ref 5–12)
NEUTROPHILS # BLD AUTO: 2.19 10*3/MM3 (ref 1.9–8.1)
NEUTROPHILS NFR BLD AUTO: 43 % (ref 42.7–76)
PLATELET # BLD AUTO: 308 10*3/MM3 (ref 140–500)
POTASSIUM SERPL-SCNC: 4.6 MMOL/L (ref 3.5–5.2)
PROT SERPL-MCNC: 7.2 G/DL (ref 6–8.5)
RBC # BLD AUTO: 4.86 10*6/MM3 (ref 4.6–6)
SODIUM SERPL-SCNC: 141 MMOL/L (ref 136–145)
TRIGL SERPL-MCNC: 41 MG/DL (ref 0–150)
TSH SERPL-ACNC: 4.06 MIU/ML (ref 0.27–4.2)
VLDLC SERPL-MCNC: 8.2 MG/DL (ref 5–40)
WBC # BLD AUTO: 5.09 10*3/MM3 (ref 4.5–10.7)

## 2018-11-09 ENCOUNTER — TELEPHONE (OUTPATIENT)
Dept: INTERNAL MEDICINE | Facility: CLINIC | Age: 68
End: 2018-11-09

## 2018-11-09 NOTE — TELEPHONE ENCOUNTER
----- Message from Martha Pérez sent at 11/9/2018 10:24 AM EST -----  Contact: Patient  Patient calling would like a call back from Dr. Hurley to discuss thyroid lab results. Please advise    Patient:[phone]

## 2018-12-21 ENCOUNTER — TELEPHONE (OUTPATIENT)
Dept: INTERNAL MEDICINE | Facility: CLINIC | Age: 68
End: 2018-12-21

## 2018-12-21 DIAGNOSIS — Z12.11 ENCOUNTER FOR SCREENING COLONOSCOPY: Primary | ICD-10-CM

## 2018-12-21 NOTE — TELEPHONE ENCOUNTER
----- Message from Karen Wu sent at 12/21/2018  9:27 AM EST -----  Contact: Patient   Patient sent message via [employer] requesting colonoscopy be scheduled in January or February.  Not seeing a referral, order, or any mention in the chart.  Please advise.     Patient:  [phone]

## 2019-01-02 ENCOUNTER — OFFICE VISIT (OUTPATIENT)
Dept: INTERNAL MEDICINE | Facility: CLINIC | Age: 69
End: 2019-01-02

## 2019-01-02 VITALS
DIASTOLIC BLOOD PRESSURE: 82 MMHG | SYSTOLIC BLOOD PRESSURE: 154 MMHG | BODY MASS INDEX: 28.35 KG/M2 | HEART RATE: 71 BPM | HEIGHT: 70 IN | WEIGHT: 198 LBS | OXYGEN SATURATION: 98 % | TEMPERATURE: 98.7 F

## 2019-01-02 DIAGNOSIS — I10 BENIGN ESSENTIAL HTN: Primary | ICD-10-CM

## 2019-01-02 DIAGNOSIS — J01.00 ACUTE NON-RECURRENT MAXILLARY SINUSITIS: ICD-10-CM

## 2019-01-02 DIAGNOSIS — R05.9 COUGH: ICD-10-CM

## 2019-01-02 DIAGNOSIS — M48.02 CERVICAL SPINAL STENOSIS: Chronic | ICD-10-CM

## 2019-01-02 PROCEDURE — 99214 OFFICE O/P EST MOD 30 MIN: CPT | Performed by: INTERNAL MEDICINE

## 2019-01-02 RX ORDER — CLARITHROMYCIN 500 MG/1
500 TABLET, COATED ORAL 2 TIMES DAILY
Qty: 20 TABLET | Refills: 0 | Status: SHIPPED | OUTPATIENT
Start: 2019-01-02 | End: 2019-01-02 | Stop reason: SDUPTHER

## 2019-01-02 RX ORDER — DEXTROMETHORPHAN HYDROBROMIDE AND PROMETHAZINE HYDROCHLORIDE 15; 6.25 MG/5ML; MG/5ML
5 SYRUP ORAL 4 TIMES DAILY PRN
Qty: 240 ML | Refills: 0 | Status: SHIPPED | OUTPATIENT
Start: 2019-01-02 | End: 2019-06-27

## 2019-01-02 RX ORDER — CLARITHROMYCIN 500 MG/1
500 TABLET, COATED ORAL 2 TIMES DAILY
Qty: 20 TABLET | Refills: 0 | Status: SHIPPED | OUTPATIENT
Start: 2019-01-02 | End: 2019-06-27

## 2019-01-02 RX ORDER — DEXTROMETHORPHAN HYDROBROMIDE AND PROMETHAZINE HYDROCHLORIDE 15; 6.25 MG/5ML; MG/5ML
5 SYRUP ORAL 4 TIMES DAILY PRN
Qty: 240 ML | Refills: 0 | Status: SHIPPED | OUTPATIENT
Start: 2019-01-02 | End: 2019-01-02 | Stop reason: SDUPTHER

## 2019-01-02 NOTE — PROGRESS NOTES
Subjective   Anoop Montenegro is a 68 y.o. male. Presents with a chief complaint of chest and sinus congestion with a productive cough, also has HTN, Hyperlipidemia and CDD.    History of Present Illness patient has been having sinus congestion and a productive cough for the last 18 days    The following portions of the patient's history were reviewed and updated as appropriate: allergies, current medications, past family history, past medical history, past social history, past surgical history and problem list.    Review of Systems   Constitutional: Positive for fatigue and fever.   HENT: Positive for congestion.    Respiratory: Positive for cough.    Cardiovascular: Negative.    Gastrointestinal: Negative.    Endocrine: Negative.    Musculoskeletal: Positive for neck pain.       Objective   Physical Exam   Constitutional: He appears well-developed and well-nourished.   HENT:   Head: Normocephalic and atraumatic.   Congested and tender maxillary sinuses   Eyes: EOM are normal. Pupils are equal, round, and reactive to light.   Neck: Normal range of motion. Neck supple.   Pain at the base of his neck throughout full ROM   Cardiovascular: Normal rate, regular rhythm and normal heart sounds.   Pulmonary/Chest: Effort normal.   Bibasilar rhonchi   Abdominal: Soft. Bowel sounds are normal.   Musculoskeletal: Normal range of motion.   Neurological: He is alert.   Skin: Skin is warm.   Nursing note and vitals reviewed.        Assessment/Plan   Anoop was seen today for sinus problem and headache.    Diagnoses and all orders for this visit:    Benign essential HTN  Comments:  well controlled    Cervical spinal stenosis  Comments:  pain made worse by the coughing    Acute non-recurrent maxillary sinusitis  Comments:  clarithromycin 500 mg BID    Cough  Comments:  phenergan with codiene cough syrup    Other orders  -     clarithromycin (BIAXIN) 500 MG tablet; Take 1 tablet by mouth 2 (Two) Times a Day.  -      promethazine-dextromethorphan (PROMETHAZINE-DM) 6.25-15 MG/5ML syrup; Take 5 mL by mouth 4 (Four) Times a Day As Needed for Cough.

## 2019-01-04 ENCOUNTER — TELEPHONE (OUTPATIENT)
Dept: INTERNAL MEDICINE | Facility: CLINIC | Age: 69
End: 2019-01-04

## 2019-01-04 NOTE — TELEPHONE ENCOUNTER
----- Message from Margaret Greene sent at 1/4/2019 12:34 PM EST -----  Contact: Yecenia, spouse - Dr STEIN's pt - RE: pt still in pain  Yecenia, spouse calling and would like a return call regarding pt seen by Dr STEIN and was given Rx. She informs has pain, shoting pain in head. Yecenia informs would like to give something for pain but not sure what. Could you please call pt to discuss options? Please advise. Thanks      Arron, pt  # [phone]

## 2019-01-11 ENCOUNTER — TELEPHONE (OUTPATIENT)
Dept: INTERNAL MEDICINE | Facility: CLINIC | Age: 69
End: 2019-01-11

## 2019-01-11 NOTE — TELEPHONE ENCOUNTER
----- Message from Martha Pérez sent at 1/11/2019 10:30 AM EST -----  Contact: Patient  Patient calling would the antibiotic that was given to him has not helped at all. Would leslie a call back to discuss what else he can do. Please advise    Patient:[phone]    Pharmacy:[facility] [facility] AT [address] - [phone] [facility] [phone] FX

## 2019-01-21 ENCOUNTER — TELEPHONE (OUTPATIENT)
Dept: GASTROENTEROLOGY | Facility: CLINIC | Age: 69
End: 2019-01-21

## 2019-01-21 NOTE — TELEPHONE ENCOUNTER
Left voice message for patient to call the office regarding questions on his OA paperwork(i.e., Where was last colonoscopy performed?)

## 2019-01-28 DIAGNOSIS — E03.9 HYPOTHYROIDISM, UNSPECIFIED TYPE: ICD-10-CM

## 2019-01-28 RX ORDER — IRBESARTAN 300 MG/1
300 TABLET ORAL DAILY
Qty: 90 TABLET | Refills: 1 | Status: SHIPPED | OUTPATIENT
Start: 2019-01-28 | End: 2019-07-04 | Stop reason: HOSPADM

## 2019-01-28 RX ORDER — LEVOTHYROXINE SODIUM 0.1 MG/1
100 TABLET ORAL DAILY
Qty: 90 TABLET | Refills: 1 | Status: SHIPPED | OUTPATIENT
Start: 2019-01-28 | End: 2019-07-04 | Stop reason: HOSPADM

## 2019-01-30 NOTE — TELEPHONE ENCOUNTER
Spoke with patient regarding where his last colonoscopy was performed.  Patient stated that his last colonoscopy was done by Dr Florez(he thinks at [facility]).

## 2019-01-31 ENCOUNTER — PREP FOR SURGERY (OUTPATIENT)
Dept: OTHER | Facility: HOSPITAL | Age: 69
End: 2019-01-31

## 2019-01-31 DIAGNOSIS — Z86.010 HX OF COLONIC POLYPS: Primary | ICD-10-CM

## 2019-01-31 DIAGNOSIS — Z83.71 FH: COLON POLYPS: ICD-10-CM

## 2019-02-18 RX ORDER — ATORVASTATIN CALCIUM 20 MG/1
TABLET, FILM COATED ORAL
Qty: 90 TABLET | Refills: 1 | Status: SHIPPED | OUTPATIENT
Start: 2019-02-18 | End: 2019-07-01

## 2019-03-11 ENCOUNTER — OUTSIDE FACILITY SERVICE (OUTPATIENT)
Dept: GASTROENTEROLOGY | Facility: CLINIC | Age: 69
End: 2019-03-11

## 2019-03-11 PROCEDURE — 45380 COLONOSCOPY AND BIOPSY: CPT | Performed by: INTERNAL MEDICINE

## 2019-03-11 PROCEDURE — 45385 COLONOSCOPY W/LESION REMOVAL: CPT | Performed by: INTERNAL MEDICINE

## 2019-03-19 ENCOUNTER — TELEPHONE (OUTPATIENT)
Dept: GASTROENTEROLOGY | Facility: CLINIC | Age: 69
End: 2019-03-19

## 2019-03-19 NOTE — TELEPHONE ENCOUNTER
Pt called and advised per Dr Montemayor that the colon polyp he removed was not cancerous but was precancerous and he recommends a repeat c/s in 3 yrs and call sooner if needed. Pt verb understanding.

## 2019-03-19 NOTE — TELEPHONE ENCOUNTER
----- Message from Ronn RIVERA MD sent at 3/18/2019  5:51 PM EDT -----  Regarding: Biopsy results  Okay to call results, recommend follow-up colonoscopy in 3 years.  Office follow-up sooner as needed.  ----- Message -----  From: Interface, Scans Incoming  Sent: 3/18/2019   8:35 AM  To: Ronn RIVERA MD

## 2019-06-25 RX ORDER — MELOXICAM 15 MG/1
15 TABLET ORAL DAILY
Qty: 90 TABLET | Refills: 1 | Status: SHIPPED | OUTPATIENT
Start: 2019-06-25 | End: 2019-06-30

## 2019-06-27 ENCOUNTER — OFFICE VISIT (OUTPATIENT)
Dept: INTERNAL MEDICINE | Facility: CLINIC | Age: 69
End: 2019-06-27

## 2019-06-27 VITALS
HEART RATE: 72 BPM | BODY MASS INDEX: 27.2 KG/M2 | TEMPERATURE: 99.2 F | DIASTOLIC BLOOD PRESSURE: 78 MMHG | SYSTOLIC BLOOD PRESSURE: 152 MMHG | OXYGEN SATURATION: 98 % | HEIGHT: 70 IN | WEIGHT: 190 LBS

## 2019-06-27 DIAGNOSIS — R07.89 OTHER CHEST PAIN: ICD-10-CM

## 2019-06-27 DIAGNOSIS — R50.9 FEVER AND CHILLS: ICD-10-CM
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
## 2019-06-27 LAB
BASOPHILS # BLD AUTO: 0.01 10*3/MM3 (ref 0–0.2)
BASOPHILS NFR BLD AUTO: 0.4 % (ref 0–1.5)
DEPRECATED RDW RBC AUTO: 43.9 FL (ref 37–54)
EOSINOPHIL # BLD AUTO: 0.02 10*3/MM3 (ref 0–0.4)
EOSINOPHIL NFR BLD AUTO: 0.7 % (ref 0.3–6.2)
ERYTHROCYTE [DISTWIDTH] IN BLOOD BY AUTOMATED COUNT: 13.5 % (ref 12.3–15.4)
HCT VFR BLD AUTO: 44.7 % (ref 37.5–51)
HGB BLD-MCNC: 15.2 G/DL (ref 13–17.7)
LYMPHOCYTES # BLD AUTO: 0.89 10*3/MM3 (ref 0.7–3.1)
LYMPHOCYTES NFR BLD AUTO: 31.9 % (ref 19.6–45.3)
MCH RBC QN AUTO: 30.9 PG (ref 26.6–33)
MCHC RBC AUTO-ENTMCNC: 34 G/DL (ref 31.5–35.7)
MCV RBC AUTO: 90.9 FL (ref 79–97)
MONOCYTES # BLD AUTO: 0.38 10*3/MM3 (ref 0.1–0.9)
MONOCYTES NFR BLD AUTO: 13.6 % (ref 5–12)
NEUTROPHILS # BLD AUTO: 1.49 10*3/MM3 (ref 1.7–7)
NEUTROPHILS NFR BLD AUTO: 53.4 % (ref 42.7–76)
PLATELET # BLD AUTO: 215 10*3/MM3 (ref 140–450)
PMV BLD AUTO: 10.6 FL (ref 6–12)
RBC # BLD AUTO: 4.92 10*6/MM3 (ref 4.14–5.8)
WBC NRBC COR # BLD: 2.79 10*3/MM3 (ref 3.4–10.8)

## 2019-06-27 PROCEDURE — 99213 OFFICE O/P EST LOW 20 MIN: CPT | Performed by: NURSE PRACTITIONER

## 2019-06-27 PROCEDURE — 85025 COMPLETE CBC W/AUTO DIFF WBC: CPT | Performed by: NURSE PRACTITIONER

## 2019-06-27 PROCEDURE — 93000 ELECTROCARDIOGRAM COMPLETE: CPT | Performed by: NURSE PRACTITIONER

## 2019-06-27 RX ORDER — DOXYCYCLINE 100 MG/1
100 TABLET ORAL 2 TIMES DAILY
Qty: 14 TABLET | Refills: 0 | Status: SHIPPED | OUTPATIENT
Start: 2019-06-27 | End: 2019-06-27

## 2019-06-27 RX ORDER — DOXYCYCLINE 100 MG/1
100 TABLET ORAL 2 TIMES DAILY
Qty: 28 TABLET | Refills: 0 | Status: SHIPPED | OUTPATIENT
Start: 2019-06-27 | End: 2019-06-27 | Stop reason: SDUPTHER

## 2019-06-27 RX ORDER — DOXYCYCLINE 100 MG/1
100 TABLET ORAL 2 TIMES DAILY
Qty: 28 TABLET | Refills: 0 | Status: SHIPPED | OUTPATIENT
Start: 2019-06-27 | End: 2019-07-04 | Stop reason: HOSPADM

## 2019-06-27 NOTE — PROGRESS NOTES
Subjective   Anoop Montenegro is a 68 y.o. male.     He was recently bit by a tick 2 wks ago.      Back Pain   The pain does not radiate. The pain is moderate. The pain is the same all the time. Associated symptoms include chest pain (across entire chest, same nerve feeling pain as back), a fever (last night was the first occassion, 100.3), leg pain and weakness (minor, BLE). Pertinent negatives include no abdominal pain, bladder incontinence, bowel incontinence, headaches, numbness, paresis, paresthesias, pelvic pain, perianal numbness or tingling. (Sleep disturbance x 3 days, back pain more nerve pain) Treatments tried: baclofen advil. The treatment provided mild relief.        The following portions of the patient's history were reviewed and updated as appropriate: allergies, current medications, past family history, past medical history, past social history, past surgical history and problem list.    Review of Systems   Constitutional: Positive for appetite change, chills, fatigue and fever (last night was the first occassion, 100.3).   Eyes: Negative for visual disturbance.   Respiratory: Negative for cough, chest tightness, shortness of breath and wheezing.    Cardiovascular: Positive for chest pain (across entire chest, same nerve feeling pain as back). Negative for palpitations and leg swelling.   Gastrointestinal: Positive for nausea. Negative for abdominal pain, bowel incontinence, constipation, diarrhea and vomiting.   Genitourinary: Negative for bladder incontinence, difficulty urinating and pelvic pain.   Musculoskeletal: Positive for back pain.   Neurological: Positive for weakness (minor, BLE). Negative for tingling, numbness, headaches and paresthesias.       Objective   Physical Exam    Assessment/Plan   Anoop was seen today for back pain and skin sensitivity.    Diagnoses and all orders for this visit:    Tick bite, initial encounter  -     B. Burgdorferi Antibodies, WB Reflex; Future  -     [place] SF (IgG / M)  -     doxycycline (ADOXA) 100 MG tablet; Take 1 tablet by mouth 2 (Two) Times a Day for 14 days.    Fever and chills  -     CBC Auto Differential    Other chest pain  -     ECG 12 Lead    He is already scheduled for f/u with Dr. Hurley next week. He will f/u for this visit next week.    ECG 12 Lead  Date/Time: 6/27/2019 10:15 AM  Performed by: Pati Donald APRN  Authorized by: Pati Donald APRN   Comparison: compared with previous ECG from 11/5/2017  Similar to previous ECG  Rhythm: sinus rhythm  Rate: normal  Conduction: left anterior fascicular block  QRS axis: normal  Other findings: non-specific ST-T wave changes    Clinical impression: abnormal EKG  Comments: Similar to previous EKG

## 2019-06-28 ENCOUNTER — OFFICE VISIT (OUTPATIENT)
Dept: INTERNAL MEDICINE | Facility: CLINIC | Age: 69
End: 2019-06-28

## 2019-06-28 VITALS
TEMPERATURE: 99 F | SYSTOLIC BLOOD PRESSURE: 140 MMHG | HEART RATE: 83 BPM | DIASTOLIC BLOOD PRESSURE: 82 MMHG | HEIGHT: 70 IN | WEIGHT: 189 LBS | BODY MASS INDEX: 27.06 KG/M2 | OXYGEN SATURATION: 98 %

## 2019-06-28 DIAGNOSIS — M79.2 NEURALGIA: Primary | ICD-10-CM

## 2019-06-28 DIAGNOSIS — R50.9 FEVER AND CHILLS: ICD-10-CM

## 2019-06-28 DIAGNOSIS — R11.0 NAUSEA: ICD-10-CM

## 2019-06-28 LAB
ALBUMIN SERPL-MCNC: 4.1 G/DL (ref 3.5–5.2)
ALBUMIN/GLOB SERPL: 1.7 G/DL
ALP SERPL-CCNC: 74 U/L (ref 39–117)
ALT SERPL-CCNC: 23 U/L (ref 1–41)
AST SERPL-CCNC: 28 U/L (ref 1–40)
B BURGDOR IGG+IGM SER-ACNC: <0.91 ISR (ref 0–0.9)
B BURGDOR IGM SER-ACNC: <0.8 INDEX (ref 0–0.79)
BASOPHILS # BLD AUTO: 0 10*3/MM3 (ref 0–0.2)
BASOPHILS NFR BLD AUTO: 0 % (ref 0–1.5)
BILIRUB SERPL-MCNC: 0.2 MG/DL (ref 0.2–1.2)
BUN SERPL-MCNC: 16 MG/DL (ref 8–23)
BUN/CREAT SERPL: 12.7 (ref 7–25)
CALCIUM SERPL-MCNC: 8.7 MG/DL (ref 8.6–10.5)
CHLORIDE SERPL-SCNC: 93 MMOL/L (ref 98–107)
CO2 SERPL-SCNC: 24.5 MMOL/L (ref 22–29)
CREAT SERPL-MCNC: 1.26 MG/DL (ref 0.76–1.27)
CRP SERPL-MCNC: 0.08 MG/DL (ref 0–0.5)
DEPRECATED RDW RBC AUTO: 43.2 FL (ref 37–54)
EOSINOPHIL # BLD AUTO: 0.01 10*3/MM3 (ref 0–0.4)
EOSINOPHIL NFR BLD AUTO: 0.6 % (ref 0.3–6.2)
ERYTHROCYTE [DISTWIDTH] IN BLOOD BY AUTOMATED COUNT: 13.5 % (ref 12.3–15.4)
ERYTHROCYTE [SEDIMENTATION RATE] IN BLOOD: 8 MM/HR (ref 0–20)
GLOBULIN SER CALC-MCNC: 2.4 GM/DL
GLUCOSE SERPL-MCNC: 83 MG/DL (ref 65–99)
HCT VFR BLD AUTO: 43.8 % (ref 37.5–51)
HGB BLD-MCNC: 15.2 G/DL (ref 13–17.7)
LYMPHOCYTES # BLD AUTO: 0.56 10*3/MM3 (ref 0.7–3.1)
LYMPHOCYTES NFR BLD AUTO: 31.1 % (ref 19.6–45.3)
MCH RBC QN AUTO: 31.1 PG (ref 26.6–33)
MCHC RBC AUTO-ENTMCNC: 34.7 G/DL (ref 31.5–35.7)
MCV RBC AUTO: 89.8 FL (ref 79–97)
MONOCYTES # BLD AUTO: 0.21 10*3/MM3 (ref 0.1–0.9)
MONOCYTES NFR BLD AUTO: 11.7 % (ref 5–12)
NEUTROPHILS # BLD AUTO: 1.02 10*3/MM3 (ref 1.7–7)
NEUTROPHILS NFR BLD AUTO: 56.6 % (ref 42.7–76)
PLATELET # BLD AUTO: 172 10*3/MM3 (ref 140–450)
PMV BLD AUTO: 10.3 FL (ref 6–12)
POTASSIUM SERPL-SCNC: 4.5 MMOL/L (ref 3.5–5.2)
PROT SERPL-MCNC: 6.5 G/DL (ref 6–8.5)
RBC # BLD AUTO: 4.88 10*6/MM3 (ref 4.14–5.8)
SODIUM SERPL-SCNC: 130 MMOL/L (ref 136–145)
WBC NRBC COR # BLD: 1.8 10*3/MM3 (ref 3.4–10.8)

## 2019-06-28 PROCEDURE — 85025 COMPLETE CBC W/AUTO DIFF WBC: CPT | Performed by: NURSE PRACTITIONER

## 2019-06-28 PROCEDURE — 85651 RBC SED RATE NONAUTOMATED: CPT | Performed by: NURSE PRACTITIONER

## 2019-06-28 PROCEDURE — 99214 OFFICE O/P EST MOD 30 MIN: CPT | Performed by: NURSE PRACTITIONER

## 2019-06-28 RX ORDER — GABAPENTIN 300 MG/1
300 CAPSULE ORAL NIGHTLY
Qty: 15 CAPSULE | Refills: 0 | Status: SHIPPED | OUTPATIENT
Start: 2019-06-28 | End: 2019-07-04 | Stop reason: HOSPADM

## 2019-06-28 RX ORDER — ONDANSETRON 4 MG/1
4 TABLET, FILM COATED ORAL EVERY 8 HOURS PRN
Qty: 30 TABLET | Refills: 0 | Status: SHIPPED | OUTPATIENT
Start: 2019-06-28 | End: 2019-07-04 | Stop reason: HOSPADM

## 2019-06-28 NOTE — PROGRESS NOTES
Subjective   Anoop Montenegro is a 68 y.o. male who presents due to malaise with a low grade temp.    He c/o feeling poorly since Tuesday night with increased fatigue and malaise, low grade temp () started Wednesday. He also c/o burning in his torso with intermittent tingling sensations. He denies shortness of breath. He was started on Doxycycline yesterday after removing a tick from his lower abdomen around June 16. He denies rashes.  Upon review of his labs from yesterday, his WBC is decreased (2,000) and neutrophils are increased.         The following portions of the patient's history were reviewed and updated as appropriate: allergies, current medications, past social history and problem list.    Past Medical History:   Diagnosis Date   • Allergic rhinitis    • Cervical spondylosis with myelopathy    • FHx: colonic polyps    • GERD (gastroesophageal reflux disease)    • History of colonic polyps    • Hypertension     Benign Essential   • Hypothyroidism    • Insomnia    • Malaise and fatigue    • Pure hypercholesterolemia        No current facility-administered medications for this visit.     Current Outpatient Medications:   •  atorvastatin (LIPITOR) 20 MG tablet, TAKE 1 TABLET BY MOUTH ONE TIME A DAY , Disp: 90 tablet, Rfl: 1  •  doxycycline (ADOXA) 100 MG tablet, Take 1 tablet by mouth 2 (Two) Times a Day for 14 days., Disp: 28 tablet, Rfl: 0  •  irbesartan (AVAPRO) 300 MG tablet, Take 1 tablet by mouth Daily., Disp: 90 tablet, Rfl: 1  •  levothyroxine (SYNTHROID, LEVOTHROID) 100 MCG tablet, Take 1 tablet by mouth Daily., Disp: 90 tablet, Rfl: 1  •  meloxicam (MOBIC) 15 MG tablet, Take 1 tablet by mouth Daily., Disp: 90 tablet, Rfl: 1  •  Multiple Vitamins-Minerals (MULTIVITAMIN & MINERAL PO), Take 1 tablet by mouth daily., Disp: , Rfl:   •  sildenafil (VIAGRA) 100 MG tablet, Take 1 tablet by mouth Daily As Needed for erectile dysfunction., Disp: 10 tablet, Rfl: 3  •  zolpidem (AMBIEN) 10 MG tablet, Take  "1 tablet by mouth At Night As Needed for Sleep., Disp: 90 tablet, Rfl: 1  •  gabapentin (NEURONTIN) 300 MG capsule, Take 1 capsule by mouth Every Night., Disp: 15 capsule, Rfl: 0  •  ondansetron (ZOFRAN) 4 MG tablet, Take 1 tablet by mouth Every 8 (Eight) Hours As Needed for Nausea or Vomiting for up to 3 days., Disp: 30 tablet, Rfl: 0    Facility-Administered Medications Ordered in Other Visits:   •  Insert peripheral IV, , , Once **AND** sodium chloride 0.9 % flush 10 mL, 10 mL, Intravenous, PRN, Manuel Ash MD    No Known Allergies    Review of Systems   Constitutional: Positive for fatigue and fever. Negative for chills and unexpected weight change.   HENT: Negative for congestion, ear pain, postnasal drip, sinus pressure, sore throat and trouble swallowing.    Eyes: Negative for visual disturbance.   Respiratory: Negative for cough, chest tightness and wheezing.    Cardiovascular: Negative for chest pain, palpitations and leg swelling.   Gastrointestinal: Positive for nausea. Negative for abdominal pain, blood in stool and vomiting.   Genitourinary: Negative for dysuria, frequency and urgency.   Musculoskeletal: Negative for arthralgias and joint swelling.   Skin: Negative for color change.   Neurological: Positive for dizziness and light-headedness. Negative for syncope, weakness and headaches.        Intermittent tingling sensations in torso   Hematological: Does not bruise/bleed easily.       Objective   Vitals:    06/28/19 1355   BP: 140/82   BP Location: Left arm   Patient Position: Sitting   Cuff Size: Adult   Pulse: 83   Temp: 99 °F (37.2 °C)   TempSrc: Oral   SpO2: 98%   Weight: 85.7 kg (189 lb)   Height: 177.8 cm (70\")     Physical Exam   Constitutional: He appears well-developed and well-nourished. He is cooperative. He does not have a sickly appearance. He does not appear ill.   HENT:   Head: Normocephalic.   Right Ear: Hearing, tympanic membrane and external ear normal.   Left Ear: " Hearing, tympanic membrane and external ear normal.   Nose: Nose normal. No mucosal edema, rhinorrhea, sinus tenderness or nasal deformity. Right sinus exhibits no maxillary sinus tenderness and no frontal sinus tenderness. Left sinus exhibits no maxillary sinus tenderness and no frontal sinus tenderness.   Mouth/Throat: Oropharynx is clear and moist and mucous membranes are normal. Normal dentition.   Eyes: Conjunctivae and lids are normal. Right eye exhibits no discharge and no exudate. Left eye exhibits no discharge and no exudate.   Neck: Trachea normal. Carotid bruit is not present. No edema present. No thyroid mass present.   Cardiovascular: Regular rhythm, normal heart sounds and normal pulses.   No murmur heard.  Pulmonary/Chest: Breath sounds normal. No respiratory distress. He has no decreased breath sounds. He has no wheezes. He has no rhonchi. He has no rales.   Abdominal: Soft. There is no tenderness.   Lymphadenopathy:        Head (right side): No submental, no submandibular, no tonsillar, no preauricular, no posterior auricular and no occipital adenopathy present.        Head (left side): No submental, no submandibular, no tonsillar, no preauricular, no posterior auricular and no occipital adenopathy present.   Neurological: He is alert.   Skin: Skin is warm, dry and intact. No cyanosis. Nails show no clubbing.       Assessment/Plan   Anoop was seen today for nausea, insomnia and sensitive skin.    Diagnoses and all orders for this visit:    Neuralgia  -     gabapentin (NEURONTIN) 300 MG capsule; Take 1 capsule by mouth Every Night.  -     CARMEL With / DsDNA, RNP, Sjogrens A / B, Geronimo; Future  -     CARMEL With / DsDNA, RNP, Sjogrens A / B, Smith    Fever and chills  -     CBC Auto Differential; Future  -     Sedimentation Rate; Future  -     C-reactive Protein; Future  -     CBC Auto Differential  -     Sedimentation Rate  -     C-reactive Protein  -     Comprehensive Metabolic Panel; Future  -     CARMEL  With / DsDNA, RNP, Sjogrens A / B, Geronimo; Future  -     Comprehensive Metabolic Panel  -     CARMEL With / DsDNA, RNP, Sjogrens A / B, Smith    Nausea  -     ondansetron (ZOFRAN) 4 MG tablet; Take 1 tablet by mouth Every 8 (Eight) Hours As Needed for Nausea or Vomiting for up to 3 days.    Discussed decreased WBC, sx due to viral illness vs. allergic reaction (although no rashes) vs. recent tick exposure. He is on Doxycycline which he should continue. He is given Gabapentin due to tingling sensations in his chest and difficulty sleeping. Will check additional labs and re-evaluate on Monday-advised to report to ER with worsening symptoms.    MARCIANO query complete. Treatment plan to include limited course of prescribed  controlled substance. Risks including addiction, benefits, and alternatives presented to patient.

## 2019-06-30 ENCOUNTER — DOCUMENTATION (OUTPATIENT)
Dept: INTERNAL MEDICINE | Facility: CLINIC | Age: 69
End: 2019-06-30

## 2019-06-30 ENCOUNTER — APPOINTMENT (OUTPATIENT)
Dept: GENERAL RADIOLOGY | Facility: HOSPITAL | Age: 69
End: 2019-06-30

## 2019-06-30 ENCOUNTER — APPOINTMENT (OUTPATIENT)
Dept: CT IMAGING | Facility: HOSPITAL | Age: 69
End: 2019-06-30

## 2019-06-30 ENCOUNTER — HOSPITAL ENCOUNTER (EMERGENCY)
Facility: HOSPITAL | Age: 69
Discharge: HOME | End: 2019-06-30
Attending: EMERGENCY MEDICINE | Admitting: EMERGENCY MEDICINE

## 2019-06-30 VITALS
DIASTOLIC BLOOD PRESSURE: 75 MMHG | WEIGHT: 190 LBS | HEART RATE: 70 BPM | OXYGEN SATURATION: 96 % | BODY MASS INDEX: 26.6 KG/M2 | SYSTOLIC BLOOD PRESSURE: 112 MMHG | RESPIRATION RATE: 18 BRPM | HEIGHT: 71 IN | TEMPERATURE: 100 F

## 2019-06-30 DIAGNOSIS — D72.819 LEUKOPENIA, UNSPECIFIED TYPE: ICD-10-CM

## 2019-06-30 DIAGNOSIS — R74.8 ELEVATED LIPASE: ICD-10-CM

## 2019-06-30 DIAGNOSIS — B88.2 TICK-BORNE DISEASE: Primary | ICD-10-CM

## 2019-06-30 DIAGNOSIS — R79.89 ELEVATED LFTS: ICD-10-CM

## 2019-06-30 LAB
ALBUMIN SERPL-MCNC: 3.5 G/DL (ref 3.5–5.2)
ALBUMIN/GLOB SERPL: 1.1 G/DL
ALP SERPL-CCNC: 70 U/L (ref 39–117)
ALT SERPL W P-5'-P-CCNC: 62 U/L (ref 1–41)
ANION GAP SERPL CALCULATED.3IONS-SCNC: 11.9 MMOL/L (ref 5–15)
AST SERPL-CCNC: 98 U/L (ref 1–40)
BACTERIA UR QL AUTO: NORMAL /HPF
BILIRUB SERPL-MCNC: 0.3 MG/DL (ref 0.2–1.2)
BILIRUB UR QL STRIP: NEGATIVE
BUN BLD-MCNC: 23 MG/DL (ref 8–23)
BUN/CREAT SERPL: 18.4 (ref 7–25)
CALCIUM SPEC-SCNC: 8.2 MG/DL (ref 8.6–10.5)
CHLORIDE SERPL-SCNC: 101 MMOL/L (ref 98–107)
CLARITY UR: CLEAR
CO2 SERPL-SCNC: 21.1 MMOL/L (ref 22–29)
COLOR UR: YELLOW
CREAT BLD-MCNC: 1.25 MG/DL (ref 0.76–1.27)
D DIMER PPP FEU-MCNC: 2.5 MCGFEU/ML (ref 0–0.49)
DEPRECATED RDW RBC AUTO: 44.2 FL (ref 37–54)
ERYTHROCYTE [DISTWIDTH] IN BLOOD BY AUTOMATED COUNT: 13.4 % (ref 12.3–15.4)
GFR SERPL CREATININE-BSD FRML MDRD: 58 ML/MIN/1.73
GLOBULIN UR ELPH-MCNC: 3.2 GM/DL
GLUCOSE BLD-MCNC: 102 MG/DL (ref 65–99)
GLUCOSE UR STRIP-MCNC: NEGATIVE MG/DL
HCT VFR BLD AUTO: 44.8 % (ref 37.5–51)
HETEROPH AB SER QL LA: NEGATIVE
HGB BLD-MCNC: 15.1 G/DL (ref 13–17.7)
HGB UR QL STRIP.AUTO: ABNORMAL
HYALINE CASTS UR QL AUTO: NORMAL /LPF
KETONES UR QL STRIP: NEGATIVE
LEUKOCYTE ESTERASE UR QL STRIP.AUTO: NEGATIVE
LIPASE SERPL-CCNC: 130 U/L (ref 13–60)
LYMPHOCYTES # BLD MANUAL: 0.27 10*3/MM3 (ref 0.7–3.1)
LYMPHOCYTES NFR BLD MANUAL: 18 % (ref 19.6–45.3)
LYMPHOCYTES NFR BLD MANUAL: 2 % (ref 5–12)
MCH RBC QN AUTO: 30.4 PG (ref 26.6–33)
MCHC RBC AUTO-ENTMCNC: 33.7 G/DL (ref 31.5–35.7)
MCV RBC AUTO: 90.1 FL (ref 79–97)
MONOCYTES # BLD AUTO: 0.03 10*3/MM3 (ref 0.1–0.9)
NEUTROPHILS # BLD AUTO: 1.18 10*3/MM3 (ref 1.7–7)
NEUTROPHILS NFR BLD MANUAL: 79 % (ref 42.7–76)
NITRITE UR QL STRIP: NEGATIVE
PH UR STRIP.AUTO: 5.5 [PH] (ref 5–8)
PLASMA CELL PREC NFR BLD MANUAL: 1 % (ref 0–0)
PLAT MORPH BLD: NORMAL
PLATELET # BLD AUTO: 150 10*3/MM3 (ref 140–450)
PMV BLD AUTO: 12.6 FL (ref 6–12)
POTASSIUM BLD-SCNC: 4.9 MMOL/L (ref 3.5–5.2)
PROT SERPL-MCNC: 6.7 G/DL (ref 6–8.5)
PROT UR QL STRIP: NEGATIVE
RBC # BLD AUTO: 4.97 10*6/MM3 (ref 4.14–5.8)
RBC # UR: NORMAL /HPF
RBC MORPH BLD: NORMAL
REF LAB TEST METHOD: NORMAL
SODIUM BLD-SCNC: 134 MMOL/L (ref 136–145)
SP GR UR STRIP: 1.02 (ref 1–1.03)
SQUAMOUS #/AREA URNS HPF: NORMAL /HPF
TROPONIN T SERPL-MCNC: <0.01 NG/ML (ref 0–0.03)
UROBILINOGEN UR QL STRIP: ABNORMAL
WBC MORPH BLD: NORMAL
WBC NRBC COR # BLD: 1.49 10*3/MM3 (ref 3.4–10.8)
WBC UR QL AUTO: NORMAL /HPF

## 2019-06-30 PROCEDURE — 74177 CT ABD & PELVIS W/CONTRAST: CPT

## 2019-06-30 PROCEDURE — 71046 X-RAY EXAM CHEST 2 VIEWS: CPT

## 2019-06-30 PROCEDURE — 0 IOPAMIDOL PER 1 ML: Performed by: EMERGENCY MEDICINE

## 2019-06-30 PROCEDURE — 93005 ELECTROCARDIOGRAM TRACING: CPT | Performed by: EMERGENCY MEDICINE

## 2019-06-30 PROCEDURE — 83690 ASSAY OF LIPASE: CPT | Performed by: EMERGENCY MEDICINE

## 2019-06-30 PROCEDURE — 71275 CT ANGIOGRAPHY CHEST: CPT

## 2019-06-30 PROCEDURE — 85025 COMPLETE CBC W/AUTO DIFF WBC: CPT | Performed by: EMERGENCY MEDICINE

## 2019-06-30 PROCEDURE — 86666 EHRLICHIA ANTIBODY: CPT | Performed by: EMERGENCY MEDICINE

## 2019-06-30 PROCEDURE — 85007 BL SMEAR W/DIFF WBC COUNT: CPT | Performed by: EMERGENCY MEDICINE

## 2019-06-30 PROCEDURE — 85379 FIBRIN DEGRADATION QUANT: CPT | Performed by: EMERGENCY MEDICINE

## 2019-06-30 PROCEDURE — 93010 ELECTROCARDIOGRAM REPORT: CPT | Performed by: INTERNAL MEDICINE

## 2019-06-30 PROCEDURE — 80053 COMPREHEN METABOLIC PANEL: CPT | Performed by: EMERGENCY MEDICINE

## 2019-06-30 PROCEDURE — 99283 EMERGENCY DEPT VISIT LOW MDM: CPT

## 2019-06-30 PROCEDURE — 81001 URINALYSIS AUTO W/SCOPE: CPT | Performed by: EMERGENCY MEDICINE

## 2019-06-30 PROCEDURE — 86308 HETEROPHILE ANTIBODY SCREEN: CPT | Performed by: EMERGENCY MEDICINE

## 2019-06-30 PROCEDURE — 84484 ASSAY OF TROPONIN QUANT: CPT | Performed by: EMERGENCY MEDICINE

## 2019-06-30 RX ORDER — SODIUM CHLORIDE 0.9 % (FLUSH) 0.9 %
10 SYRINGE (ML) INJECTION AS NEEDED
Status: DISCONTINUED | OUTPATIENT
Start: 2019-06-30 | End: 2019-06-30 | Stop reason: HOSPADM

## 2019-06-30 RX ORDER — HYDROCODONE BITARTRATE AND ACETAMINOPHEN 5; 325 MG/1; MG/1
1 TABLET ORAL EVERY 6 HOURS PRN
Qty: 8 TABLET | Refills: 0 | Status: SHIPPED | OUTPATIENT
Start: 2019-06-30 | End: 2019-08-01

## 2019-06-30 RX ADMIN — IOPAMIDOL 100 ML: 755 INJECTION, SOLUTION INTRAVENOUS at 09:53

## 2019-06-30 NOTE — PROGRESS NOTES
On call note:  Patient with increased chest pain- stabbing, intermittent low grade temperature () and blood pressure low 95/65. I spoke with patient spouse and recommend he go to ER. She states his chest pain is more persistent then it was several days ago. I reviewed what labs were recently done with patient spouse (celia sheriff and CARMEL pending).

## 2019-07-01 ENCOUNTER — TELEPHONE (OUTPATIENT)
Dept: INTERNAL MEDICINE | Facility: CLINIC | Age: 69
End: 2019-07-01

## 2019-07-01 ENCOUNTER — HOSPITAL ENCOUNTER (INPATIENT)
Facility: HOSPITAL | Age: 69
LOS: 3 days | Discharge: HOME | End: 2019-07-04
Attending: EMERGENCY MEDICINE | Admitting: INTERNAL MEDICINE

## 2019-07-01 ENCOUNTER — APPOINTMENT (OUTPATIENT)
Dept: CT IMAGING | Facility: HOSPITAL | Age: 69
End: 2019-07-01

## 2019-07-01 ENCOUNTER — APPOINTMENT (OUTPATIENT)
Dept: GENERAL RADIOLOGY | Facility: HOSPITAL | Age: 69
End: 2019-07-01

## 2019-07-01 DIAGNOSIS — D72.819 LEUKOPENIA, UNSPECIFIED TYPE: ICD-10-CM

## 2019-07-01 DIAGNOSIS — R79.89 ELEVATED LFTS: ICD-10-CM

## 2019-07-01 DIAGNOSIS — D69.6 THROMBOCYTOPENIA (HCC): ICD-10-CM

## 2019-07-01 DIAGNOSIS — R50.9 FEVER AND CHILLS: Primary | ICD-10-CM

## 2019-07-01 DIAGNOSIS — R53.1 GENERALIZED WEAKNESS: ICD-10-CM

## 2019-07-01 PROBLEM — I73.00 RAYNAUD'S PHENOMENON WITHOUT GANGRENE: Status: ACTIVE | Noted: 2019-07-01

## 2019-07-01 LAB
ALBUMIN SERPL-MCNC: 3.5 G/DL (ref 3.5–5.2)
ALBUMIN/GLOB SERPL: 1.2 G/DL
ALP SERPL-CCNC: 72 U/L (ref 39–117)
ALT SERPL W P-5'-P-CCNC: 109 U/L (ref 1–41)
ANA SER QL: POSITIVE
ANION GAP SERPL CALCULATED.3IONS-SCNC: 10.7 MMOL/L (ref 5–15)
AST SERPL-CCNC: 173 U/L (ref 1–40)
B PARAPERT DNA SPEC QL NAA+PROBE: NOT DETECTED
B PERT DNA SPEC QL NAA+PROBE: NOT DETECTED
BACTERIA UR QL AUTO: ABNORMAL /HPF
BASOPHILS # BLD MANUAL: 0.03 10*3/MM3 (ref 0–0.2)
BASOPHILS NFR BLD AUTO: 2 % (ref 0–1.5)
BILIRUB SERPL-MCNC: 0.3 MG/DL (ref 0.2–1.2)
BILIRUB UR QL STRIP: NEGATIVE
BUN BLD-MCNC: 26 MG/DL (ref 8–23)
BUN/CREAT SERPL: 19.8 (ref 7–25)
C PNEUM DNA NPH QL NAA+NON-PROBE: NOT DETECTED
CALCIUM SPEC-SCNC: 8.4 MG/DL (ref 8.6–10.5)
CENTROMERE B AB SER-ACNC: <0.2 AI (ref 0–0.9)
CHLORIDE SERPL-SCNC: 95 MMOL/L (ref 98–107)
CHROMATIN AB SERPL-ACNC: 0.2 AI (ref 0–0.9)
CLARITY UR: CLEAR
CO2 SERPL-SCNC: 25.3 MMOL/L (ref 22–29)
COLOR UR: YELLOW
CREAT BLD-MCNC: 1.31 MG/DL (ref 0.76–1.27)
D-LACTATE SERPL-SCNC: 0.8 MMOL/L (ref 0.5–2)
DEPRECATED RDW RBC AUTO: 44.4 FL (ref 37–54)
DSDNA AB SER-ACNC: 2 IU/ML (ref 0–9)
ENA JO1 AB SER-ACNC: <0.2 AI (ref 0–0.9)
ENA RNP AB SER-ACNC: 2.4 AI (ref 0–0.9)
ENA SCL70 AB SER-ACNC: <0.2 AI (ref 0–0.9)
ENA SM AB SER-ACNC: <0.2 AI (ref 0–0.9)
ENA SS-A AB SER-ACNC: <0.2 AI (ref 0–0.9)
ENA SS-B AB SER-ACNC: <0.2 AI (ref 0–0.9)
ERYTHROCYTE [DISTWIDTH] IN BLOOD BY AUTOMATED COUNT: 13.7 % (ref 12.3–15.4)
FLUAV H1 2009 PAND RNA NPH QL NAA+PROBE: NOT DETECTED
FLUAV H1 HA GENE NPH QL NAA+PROBE: NOT DETECTED
FLUAV H3 RNA NPH QL NAA+PROBE: NOT DETECTED
FLUAV SUBTYP SPEC NAA+PROBE: NOT DETECTED
FLUBV RNA ISLT QL NAA+PROBE: NOT DETECTED
GFR SERPL CREATININE-BSD FRML MDRD: 55 ML/MIN/1.73
GLOBULIN UR ELPH-MCNC: 2.9 GM/DL
GLUCOSE BLD-MCNC: 123 MG/DL (ref 65–99)
GLUCOSE UR STRIP-MCNC: NEGATIVE MG/DL
HADV DNA SPEC NAA+PROBE: NOT DETECTED
HAV IGM SERPL QL IA: NORMAL
HBV CORE IGM SERPL QL IA: NORMAL
HBV SURFACE AG SERPL QL IA: NORMAL
HCOV 229E RNA SPEC QL NAA+PROBE: NOT DETECTED
HCOV HKU1 RNA SPEC QL NAA+PROBE: NOT DETECTED
HCOV NL63 RNA SPEC QL NAA+PROBE: NOT DETECTED
HCOV OC43 RNA SPEC QL NAA+PROBE: NOT DETECTED
HCT VFR BLD AUTO: 43.9 % (ref 37.5–51)
HCV AB SER DONR QL: NORMAL
HGB BLD-MCNC: 15 G/DL (ref 13–17.7)
HGB UR QL STRIP.AUTO: ABNORMAL
HMPV RNA NPH QL NAA+NON-PROBE: NOT DETECTED
HPIV1 RNA SPEC QL NAA+PROBE: NOT DETECTED
HPIV2 RNA SPEC QL NAA+PROBE: NOT DETECTED
HPIV3 RNA NPH QL NAA+PROBE: NOT DETECTED
HPIV4 P GENE NPH QL NAA+PROBE: NOT DETECTED
HYALINE CASTS UR QL AUTO: ABNORMAL /LPF
KETONES UR QL STRIP: NEGATIVE
LEUKOCYTE ESTERASE UR QL STRIP.AUTO: NEGATIVE
LYMPHOCYTES # BLD MANUAL: 0.06 10*3/MM3 (ref 0.7–3.1)
LYMPHOCYTES NFR BLD MANUAL: 2 % (ref 5–12)
LYMPHOCYTES NFR BLD MANUAL: 5 % (ref 19.6–45.3)
Lab: ABNORMAL
M PNEUMO IGG SER IA-ACNC: NOT DETECTED
MCH RBC QN AUTO: 30.4 PG (ref 26.6–33)
MCHC RBC AUTO-ENTMCNC: 34.2 G/DL (ref 31.5–35.7)
MCV RBC AUTO: 89 FL (ref 79–97)
METAMYELOCYTES NFR BLD MANUAL: 1 % (ref 0–0)
MONOCYTES # BLD AUTO: 0.03 10*3/MM3 (ref 0.1–0.9)
MUCOUS THREADS URNS QL MICRO: ABNORMAL /HPF
NEUTROPHILS # BLD AUTO: 1.13 10*3/MM3 (ref 1.7–7)
NEUTROPHILS NFR BLD MANUAL: 89 % (ref 42.7–76)
NITRITE UR QL STRIP: NEGATIVE
NRBC SPEC MANUAL: 1 /100 WBC (ref 0–0.2)
PH UR STRIP.AUTO: <=5 [PH] (ref 5–8)
PLAT MORPH BLD: NORMAL
PLATELET # BLD AUTO: 75 10*3/MM3 (ref 140–450)
PMV BLD AUTO: 11.5 FL (ref 6–12)
POTASSIUM BLD-SCNC: 4.5 MMOL/L (ref 3.5–5.2)
PROCALCITONIN SERPL-MCNC: 0.08 NG/ML (ref 0.1–0.25)
PROT SERPL-MCNC: 6.4 G/DL (ref 6–8.5)
PROT UR QL STRIP: ABNORMAL
R RICKETTSI IGG SER QL IA: NORMAL
R RICKETTSI IGG SER QL IA: POSITIVE
R RICKETTSI IGM TITR SER: 0.39 INDEX (ref 0–0.89)
RBC # BLD AUTO: 4.93 10*6/MM3 (ref 4.14–5.8)
RBC # UR: ABNORMAL /HPF
RBC MORPH BLD: NORMAL
REF LAB TEST METHOD: ABNORMAL
RHINOVIRUS RNA SPEC NAA+PROBE: NOT DETECTED
RSV RNA NPH QL NAA+NON-PROBE: NOT DETECTED
SODIUM BLD-SCNC: 131 MMOL/L (ref 136–145)
SP GR UR STRIP: 1.03 (ref 1–1.03)
SQUAMOUS #/AREA URNS HPF: ABNORMAL /HPF
UROBILINOGEN UR QL STRIP: ABNORMAL
VARIANT LYMPHS NFR BLD MANUAL: 1 % (ref 0–5)
WBC MORPH BLD: NORMAL
WBC NRBC COR # BLD: 1.27 10*3/MM3 (ref 3.4–10.8)
WBC UR QL AUTO: ABNORMAL /HPF

## 2019-07-01 PROCEDURE — 87581 M.PNEUMON DNA AMP PROBE: CPT | Performed by: EMERGENCY MEDICINE

## 2019-07-01 PROCEDURE — 87633 RESP VIRUS 12-25 TARGETS: CPT | Performed by: EMERGENCY MEDICINE

## 2019-07-01 PROCEDURE — 25010000002 ONDANSETRON PER 1 MG: Performed by: EMERGENCY MEDICINE

## 2019-07-01 PROCEDURE — 87798 DETECT AGENT NOS DNA AMP: CPT | Performed by: EMERGENCY MEDICINE

## 2019-07-01 PROCEDURE — 85025 COMPLETE CBC W/AUTO DIFF WBC: CPT | Performed by: EMERGENCY MEDICINE

## 2019-07-01 PROCEDURE — 87486 CHLMYD PNEUM DNA AMP PROBE: CPT | Performed by: EMERGENCY MEDICINE

## 2019-07-01 PROCEDURE — 86701 HIV-1ANTIBODY: CPT | Performed by: EMERGENCY MEDICINE

## 2019-07-01 PROCEDURE — 83605 ASSAY OF LACTIC ACID: CPT | Performed by: EMERGENCY MEDICINE

## 2019-07-01 PROCEDURE — 86702 HIV-2 ANTIBODY: CPT | Performed by: EMERGENCY MEDICINE

## 2019-07-01 PROCEDURE — 25010000002 ONDANSETRON PER 1 MG: Performed by: INTERNAL MEDICINE

## 2019-07-01 PROCEDURE — 80053 COMPREHEN METABOLIC PANEL: CPT | Performed by: EMERGENCY MEDICINE

## 2019-07-01 PROCEDURE — 81001 URINALYSIS AUTO W/SCOPE: CPT | Performed by: EMERGENCY MEDICINE

## 2019-07-01 PROCEDURE — 87040 BLOOD CULTURE FOR BACTERIA: CPT | Performed by: EMERGENCY MEDICINE

## 2019-07-01 PROCEDURE — 99285 EMERGENCY DEPT VISIT HI MDM: CPT

## 2019-07-01 PROCEDURE — 0100U HC BIOFIRE FILMARRAY RESP PANEL 2: CPT | Performed by: EMERGENCY MEDICINE

## 2019-07-01 PROCEDURE — 80074 ACUTE HEPATITIS PANEL: CPT | Performed by: EMERGENCY MEDICINE

## 2019-07-01 PROCEDURE — 71046 X-RAY EXAM CHEST 2 VIEWS: CPT

## 2019-07-01 PROCEDURE — 84145 PROCALCITONIN (PCT): CPT | Performed by: EMERGENCY MEDICINE

## 2019-07-01 PROCEDURE — 70450 CT HEAD/BRAIN W/O DYE: CPT

## 2019-07-01 PROCEDURE — 85007 BL SMEAR W/DIFF WBC COUNT: CPT | Performed by: EMERGENCY MEDICINE

## 2019-07-01 RX ORDER — LEVOTHYROXINE SODIUM 0.1 MG/1
100 TABLET ORAL
Status: DISCONTINUED | OUTPATIENT
Start: 2019-07-02 | End: 2019-07-03

## 2019-07-01 RX ORDER — NITROGLYCERIN 0.4 MG/1
0.4 TABLET SUBLINGUAL
Status: DISCONTINUED | OUTPATIENT
Start: 2019-07-01 | End: 2019-07-04 | Stop reason: HOSPADM

## 2019-07-01 RX ORDER — ONDANSETRON 2 MG/ML
4 INJECTION INTRAMUSCULAR; INTRAVENOUS ONCE
Status: COMPLETED | OUTPATIENT
Start: 2019-07-01 | End: 2019-07-01

## 2019-07-01 RX ORDER — IBUPROFEN 400 MG/1
400 TABLET ORAL ONCE
Status: COMPLETED | OUTPATIENT
Start: 2019-07-01 | End: 2019-07-01

## 2019-07-01 RX ORDER — ONDANSETRON 2 MG/ML
4 INJECTION INTRAMUSCULAR; INTRAVENOUS EVERY 6 HOURS PRN
Status: DISCONTINUED | OUTPATIENT
Start: 2019-07-01 | End: 2019-07-01 | Stop reason: SDUPTHER

## 2019-07-01 RX ORDER — SODIUM CHLORIDE 0.9 % (FLUSH) 0.9 %
3 SYRINGE (ML) INJECTION EVERY 12 HOURS SCHEDULED
Status: DISCONTINUED | OUTPATIENT
Start: 2019-07-02 | End: 2019-07-04 | Stop reason: HOSPADM

## 2019-07-01 RX ORDER — SODIUM CHLORIDE 0.9 % (FLUSH) 0.9 %
3-10 SYRINGE (ML) INJECTION AS NEEDED
Status: DISCONTINUED | OUTPATIENT
Start: 2019-07-01 | End: 2019-07-04 | Stop reason: HOSPADM

## 2019-07-01 RX ORDER — SODIUM CHLORIDE 9 MG/ML
75 INJECTION, SOLUTION INTRAVENOUS CONTINUOUS
Status: DISCONTINUED | OUTPATIENT
Start: 2019-07-01 | End: 2019-07-03

## 2019-07-01 RX ORDER — ONDANSETRON 2 MG/ML
4 INJECTION INTRAMUSCULAR; INTRAVENOUS EVERY 6 HOURS PRN
Status: DISCONTINUED | OUTPATIENT
Start: 2019-07-01 | End: 2019-07-04 | Stop reason: HOSPADM

## 2019-07-01 RX ORDER — ACETAMINOPHEN 325 MG/1
650 TABLET ORAL EVERY 4 HOURS PRN
Status: DISCONTINUED | OUTPATIENT
Start: 2019-07-01 | End: 2019-07-04 | Stop reason: HOSPADM

## 2019-07-01 RX ORDER — ATORVASTATIN CALCIUM 20 MG/1
20 TABLET, FILM COATED ORAL DAILY
COMMUNITY
End: 2019-12-23

## 2019-07-01 RX ADMIN — SODIUM CHLORIDE 1000 ML: 9 INJECTION, SOLUTION INTRAVENOUS at 17:00

## 2019-07-01 RX ADMIN — DOXYCYCLINE 100 MG: 100 INJECTION, POWDER, LYOPHILIZED, FOR SOLUTION INTRAVENOUS at 19:01

## 2019-07-01 RX ADMIN — ONDANSETRON HYDROCHLORIDE 4 MG: 2 SOLUTION INTRAMUSCULAR; INTRAVENOUS at 17:47

## 2019-07-01 RX ADMIN — IBUPROFEN 400 MG: 400 TABLET, FILM COATED ORAL at 19:12

## 2019-07-01 RX ADMIN — SODIUM CHLORIDE 125 ML/HR: 9 INJECTION, SOLUTION INTRAVENOUS at 23:27

## 2019-07-01 RX ADMIN — ONDANSETRON 4 MG: 2 INJECTION INTRAMUSCULAR; INTRAVENOUS at 22:20

## 2019-07-01 RX ADMIN — ACETAMINOPHEN 650 MG: 325 TABLET, FILM COATED ORAL at 22:20

## 2019-07-01 NOTE — ED PROVIDER NOTES
"EMERGENCY DEPARTMENT ENCOUNTER    Room Number:  12/12  PCP: Jimbo Hurely MD  Historian: Patient  History Limited By: None      HPI  Chief Complaint: Fever  Context: Anoop Montenegro is a 68 y.o. male who presents to the ED c/o intermittent low-grade fever that began 6 days ago, and worsened today when the patient \"slept all day, and was acting confused.\" Per wife, the patient had a fever of 101.2 today that has since resolved. Pt reports nausea w/o vomiting, intermittent headache, and mild neck stiffness. He denies abdominal pain, and diarrhea. Per wife, the [place]. Pt's wife reports the patient had a fever of 101.8 and chills on 6/26/19 and 6/27/19. Pt was seen by PCP on 6/30/19 and was prescribed doxycycline, which the patient has been taking without improvement. Pt returned to PCP's office the following day (6/31/19) with c/o increased skin sensitivity and \"stabbing left-sided chest pain\" that has since resolved. Pt's wife reports the pt was seen in the ED yesterday by Dr. Ahs and had a CTA done that was negative. Wife reports the pt's WBC has decreased from 2.9 (6/26/19) to 1.8 (6/28/19).      101.2 fever this morning / 99 this afternoon  Location: generalized  Radiation: none  Character: low grade  Duration: 6 days  Severity: mild  Progression: unchanged  Aggravating Factors: none  Alleviating Factors: none        MEDICAL RECORD REVIEW    Pt was seen in the ED on 6/30/19 by Dr. Mihir MD for chest pain and fever, and was discharged with diagnosis of tick-borne illness.    Pt was seen by his PCP (DONNIE Márquez) on 6/28/19 with c/o malaise and low grade fever. Pt diagnosed with neuralgia, fever/chills, and nausea.       Pt was seen by PCP (DONNIE Wesley) on 6/27/19 with c/o of back pain and tick-borne illness. Pt started on 100 mg doxycycline at this time.    PAST MEDICAL HISTORY  Active Ambulatory Problems     Diagnosis Date Noted "   • Cervical spinal stenosis 05/25/2016   • DDD (degenerative disc disease), cervical 05/25/2016   • Benign essential HTN 11/02/2017   • Acute non-recurrent maxillary sinusitis 01/02/2019   • Cough 01/02/2019     Resolved Ambulatory Problems     Diagnosis Date Noted   • No Resolved Ambulatory Problems     Past Medical History:   Diagnosis Date   • Allergic rhinitis    • Cervical spondylosis with myelopathy    • FHx: colonic polyps    • GERD (gastroesophageal reflux disease)    • History of colonic polyps    • Hypertension    • Hypothyroidism    • Insomnia    • Malaise and fatigue    • Pure hypercholesterolemia          PAST SURGICAL HISTORY  Past Surgical History:   Procedure Laterality Date   • COLONOSCOPY N/A 11/04/2011   • COLONOSCOPY N/A 06/2005   • COLONOSCOPY N/A 11/15/2013   • CYST REMOVAL  2014   • HAND SURGERY Left     3rd finger         FAMILY HISTORY  Family History   Problem Relation Age of Onset   • Hypertension Mother    • Heart failure Mother    • Aortic aneurysm Father    • Prostate cancer Brother    • Heart disease Brother    • Hypertension Brother    • Liver cancer Sister    • Breast cancer Sister          SOCIAL HISTORY  Social History     Socioeconomic History   • Marital status:      Spouse name: Not on file   • Number of children: 2   • Years of education: POST GRAD   • Highest education level: Not on file   Occupational History   • Occupation: RETIRED   Tobacco Use   • Smoking status: Never Smoker   • Smokeless tobacco: Never Used   Substance and Sexual Activity   • Alcohol use: Yes     Comment: social   • Drug use: Defer   • Sexual activity: Defer   Social History Narrative    LIVES WITH SPOUSE         ALLERGIES  Patient has no known allergies.        REVIEW OF SYSTEMS  Review of Systems   Constitutional: Positive for fever (101.2). Negative for activity change and appetite change.   HENT: Negative for congestion and sore throat.    Eyes: Negative.    Respiratory: Negative for cough  and shortness of breath.    Cardiovascular: Negative for chest pain and leg swelling.   Gastrointestinal: Positive for nausea. Negative for abdominal pain, diarrhea and vomiting.   Endocrine: Negative.    Genitourinary: Negative for decreased urine volume and dysuria.   Musculoskeletal: Positive for neck stiffness. Negative for neck pain.   Skin: Negative for rash and wound.   Allergic/Immunologic: Negative.    Neurological: Positive for headaches. Negative for weakness and numbness.   Hematological: Negative.    Psychiatric/Behavioral: Negative.    All other systems reviewed and are negative.           PHYSICAL EXAM  ED Triage Vitals [07/01/19 1546]   Temp Heart Rate Resp BP SpO2   98 °F (36.7 °C) 64 16 -- 96 %      Temp src Heart Rate Source Patient Position BP Location FiO2 (%)   Tympanic Monitor -- -- --       Physical Exam   Constitutional: He is oriented to person, place, and time and well-developed, well-nourished, and in no distress. No distress.   HENT:   Head: Normocephalic and atraumatic.   Eyes: EOM are normal. Pupils are equal, round, and reactive to light.   Neck: Normal range of motion. Neck supple. No Brudzinski's sign and no Kernig's sign noted.   Cardiovascular: Normal rate, regular rhythm and normal heart sounds. Exam reveals no gallop and no friction rub.   No murmur heard.  Pulmonary/Chest: Effort normal and breath sounds normal. No respiratory distress. He has no wheezes. He has no rales.   Abdominal: Soft. There is no tenderness. There is no rebound and no guarding.   Musculoskeletal: Normal range of motion. He exhibits no edema.   Neurological: He is alert and oriented to person, place, and time. He has normal sensation and normal strength.   Skin: Skin is warm and dry.   Psychiatric: Mood and affect normal.   Nursing note and vitals reviewed.          LAB RESULTS  Recent Results (from the past 24 hour(s))   Comprehensive Metabolic Panel    Collection Time: 07/01/19  4:34 PM   Result Value  Ref Range    Glucose 123 (H) 65 - 99 mg/dL    BUN 26 (H) 8 - 23 mg/dL    Creatinine 1.31 (H) 0.76 - 1.27 mg/dL    Sodium 131 (L) 136 - 145 mmol/L    Potassium 4.5 3.5 - 5.2 mmol/L    Chloride 95 (L) 98 - 107 mmol/L    CO2 25.3 22.0 - 29.0 mmol/L    Calcium 8.4 (L) 8.6 - 10.5 mg/dL    Total Protein 6.4 6.0 - 8.5 g/dL    Albumin 3.50 3.50 - 5.20 g/dL    ALT (SGPT) 109 (H) 1 - 41 U/L    AST (SGOT) 173 (H) 1 - 40 U/L    Alkaline Phosphatase 72 39 - 117 U/L    Total Bilirubin 0.3 0.2 - 1.2 mg/dL    eGFR Non African Amer 55 (L) >60 mL/min/1.73    Globulin 2.9 gm/dL    A/G Ratio 1.2 g/dL    BUN/Creatinine Ratio 19.8 7.0 - 25.0    Anion Gap 10.7 5.0 - 15.0 mmol/L   Lactic Acid, Plasma    Collection Time: 07/01/19  4:34 PM   Result Value Ref Range    Lactate 0.8 0.5 - 2.0 mmol/L   Procalcitonin    Collection Time: 07/01/19  4:34 PM   Result Value Ref Range    Procalcitonin 0.08 (L) 0.10 - 0.25 ng/mL   CBC Auto Differential    Collection Time: 07/01/19  4:34 PM   Result Value Ref Range    WBC 1.27 (C) 3.40 - 10.80 10*3/mm3    RBC 4.93 4.14 - 5.80 10*6/mm3    Hemoglobin 15.0 13.0 - 17.7 g/dL    Hematocrit 43.9 37.5 - 51.0 %    MCV 89.0 79.0 - 97.0 fL    MCH 30.4 26.6 - 33.0 pg    MCHC 34.2 31.5 - 35.7 g/dL    RDW 13.7 12.3 - 15.4 %    RDW-SD 44.4 37.0 - 54.0 fl    MPV 11.5 6.0 - 12.0 fL    Platelets 75 (L) 140 - 450 10*3/mm3   Manual Differential    Collection Time: 07/01/19  4:34 PM   Result Value Ref Range    Neutrophil % 89.0 (H) 42.7 - 76.0 %    Lymphocyte % 5.0 (L) 19.6 - 45.3 %    Monocyte % 2.0 (L) 5.0 - 12.0 %    Basophil % 2.0 (H) 0.0 - 1.5 %    Metamyelocyte % 1.0 (H) 0.0 - 0.0 %    Atypical Lymphocyte % 1.0 0.0 - 5.0 %    Neutrophils Absolute 1.13 (L) 1.70 - 7.00 10*3/mm3    Lymphocytes Absolute 0.06 (L) 0.70 - 3.10 10*3/mm3    Monocytes Absolute 0.03 (L) 0.10 - 0.90 10*3/mm3    Basophils Absolute 0.03 0.00 - 0.20 10*3/mm3    nRBC 1.0 (H) 0.0 - 0.2 /100 WBC    RBC Morphology Normal Normal    WBC Morphology Normal  Normal    Platelet Morphology Normal Normal       Ordered the above labs and reviewed the results.        RADIOLOGY  CT Head Without Contrast   Preliminary Result       No evidence for acute intracranial pathology.        Radiation dose reduction techniques were utilized, including automated   exposure control and exposure modulation based on body size.              XR Chest 2 View   Final Result       No active disease in the chest.       This report was finalized on 7/1/2019 5:54 PM by Dr. Ryan Yoo M.D.               Ordered the above noted radiological studies. Reviewed by me in PACS.          PROCEDURES  Procedures      MEDICATIONS GIVEN IN ER  Medications   doxycycline (VIBRAMYCIN) 100 mg/100 mL 0.9% NS MBP (100 mg Intravenous New Bag 7/1/19 1901)   sodium chloride 0.9 % bolus 1,000 mL (1,000 mL Intravenous Currently Infusing 7/1/19 1852)   ondansetron (ZOFRAN) injection 4 mg (4 mg Intravenous Given 7/1/19 1747)   ibuprofen (ADVIL,MOTRIN) tablet 400 mg (400 mg Oral Given 7/1/19 1912)             PROGRESS AND CONSULTS  ED Course as of Jul 01 1912 Mon Jul 01, 2019 1856 6:56 PM  Patient here for fevers.  Has history of tick bites and has been on doxycycline since Thursday.  Has had continued fevers.  States has been having headaches and some confusion with the fever but gets better when the fever gets better.  Discussed with Dr. Sinha and other lab work added.  Given fluids and IV doxycycline.  Discussed with Dr. Rosa who will admit.  [SL]      ED Course User Index  [SL] Jose Lemus MD   1617 Ordered lab work and CXR for evaluation.    1740 Ordered CT head for evaluation.    1748 Placed call to infectious disease for consult.    1805 Rechecked the patient who is resting comfortably and in NAD. Patient is stable. BP- 111/81 HR- 60 Temp- 98 °F (36.7 °C) (Tympanic) O2 sat- 97%. Informed the patient of labs and imaging, and consult with ID. Discussed the plan for admission for further evaluation and  management. Pt understands and agrees with the plan, all questions answered.    1816 Placed call to [facility] for admission.    1819 Reviewed pt's case with Dr. Paige Sinha, infectious disease, who agrees with the plan for admission as the pt's labs appear consistent with tick-borne illness.    1823 Reviewed the patient's case with Dr. Rosa, [facility], who will admit the patient.    MEDICAL DECISION MAKING      MDM  Number of Diagnoses or Management Options     Amount and/or Complexity of Data Reviewed  Clinical lab tests: ordered and reviewed (WBC: 1.27; platelets: 75; BUN: 26; creatinine: 1.31)  Tests in the radiology section of CPT®: ordered and reviewed (CT head: No evidence for acute intracranial pathology.   CXR: No active disease in the chest.)  Decide to obtain previous medical records or to obtain history from someone other than the patient: yes (Epic)  Review and summarize past medical records: yes (Pt was seen in the ED on 6/30/19 by Dr. Mihir MD for chest pain and fever, and was discharged with diagnosis of tick-borne illness.    Pt was seen by his PCP (DONNIE Márquez) on 6/28/19 with c/o malaise and low grade fever. Pt diagnosed with neuralgia, fever/chills, and nausea.     Pt was seen by PCP (DONNIE Wesley) on 6/27/19 with c/o of back pain and tick-borne illness. Pt started on 100 mg doxycycline at this time.)  Independent visualization of images, tracings, or specimens: yes (Dr. Fredo MD)               DIAGNOSIS  Final diagnoses:   Fever and chills   Leukopenia, unspecified type   Thrombocytopenia (CMS/HCC)   Generalized weakness           DISPOSITION  ADMISSION    Discussed treatment plan and reason for admission with pt/family and admitting physician.  Pt/family voiced understanding of the plan for admission for further testing/treatment as needed.       Latest Documented Vital Signs:  As of 7:12 PM  BP- 116/79 HR- 55 Temp- 98 °F (36.7 °C) (Tympanic) O2 sat- 97%        --  Documentation assistance  provided by loulou Whittington for Dr. Allan MD.  Information recorded by the scribe was done at my direction and has been verified and validated by me.                                Deirdre Whittington  07/01/19 1912       Jose Lemus MD  07/01/19 222

## 2019-07-01 NOTE — PROGRESS NOTES
Clinical Pharmacy Services: Medication History    Anoop Montenegro is a 68 y.o. male presenting to [facility] for   Chief Complaint   Patient presents with   • Altered Mental Status       He  has a past medical history of Allergic rhinitis, Cervical spondylosis with myelopathy, FHx: colonic polyps, GERD (gastroesophageal reflux disease), History of colonic polyps, Hypertension, Hypothyroidism, Insomnia, Malaise and fatigue, and Pure hypercholesterolemia.    Allergies as of 07/01/2019   • (No Known Allergies)       Medication information was obtained from: Spouse  Pharmacy and Phone Number: [facility] [phone]    Prior to Admission Medications     Prescriptions Last Dose Informant Patient Reported? Taking?    atorvastatin (LIPITOR) 20 MG tablet 6/30/2019 Spouse/Significant Other Yes Yes    Take 20 mg by mouth Daily.    doxycycline (ADOXA) 100 MG tablet 7/1/2019 Spouse/Significant Other No Yes    Take 1 tablet by mouth 2 (Two) Times a Day for 14 days.    gabapentin (NEURONTIN) 300 MG capsule 6/30/2019 Spouse/Significant Other No Yes    Take 1 capsule by mouth Every Night.    HYDROcodone-acetaminophen (NORCO) 5-325 MG per tablet 6/30/2019 Spouse/Significant Other No Yes    Take 1 tablet by mouth Every 6 (Six) Hours As Needed for Moderate Pain .    irbesartan (AVAPRO) 300 MG tablet 6/30/2019 Spouse/Significant Other No Yes    Take 1 tablet by mouth Daily.    levothyroxine (SYNTHROID, LEVOTHROID) 100 MCG tablet 6/30/2019 Spouse/Significant Other No Yes    Take 1 tablet by mouth Daily.    ondansetron (ZOFRAN) 4 MG tablet 7/1/2019 Spouse/Significant Other No Yes    Take 1 tablet by mouth Every 8 (Eight) Hours As Needed for Nausea or Vomiting for up to 3 days.    sildenafil (VIAGRA) 100 MG tablet  Spouse/Significant Other No Yes    Take 1 tablet by mouth Daily As Needed for erectile dysfunction.            Medication notes: None    This medication list is complete to the best of my knowledge as of  7/1/2019    Please call if questions.    Bertha Hernández, Medication History Technician  7/1/2019 7:18 PM

## 2019-07-01 NOTE — TELEPHONE ENCOUNTER
Per patients wife, she insisted on speaking with Dr. Puentes ,  Spoke with pt's wife. Cathy kearney.

## 2019-07-01 NOTE — TELEPHONE ENCOUNTER
----- Message from Molly Roman sent at 7/1/2019 12:53 PM EDT -----  Contact: Pt spouse Yecenia   Pt spoke to Cathy this morning, she was to talk to MD.  He has slept not moved and still running a fever.  She was wanting to know if we had any advise from the doctor.    Caller#[phone]

## 2019-07-01 NOTE — TELEPHONE ENCOUNTER
Pt was in ER yesterday.  Did CT and Xray.  Also lab work.  Related to Tick bite.  Yecenia is very concerned and is calling back to check on the status of this message.

## 2019-07-02 ENCOUNTER — APPOINTMENT (OUTPATIENT)
Dept: GENERAL RADIOLOGY | Facility: HOSPITAL | Age: 69
End: 2019-07-02

## 2019-07-02 LAB
ALBUMIN SERPL-MCNC: 2.9 G/DL (ref 3.5–5.2)
ALBUMIN/GLOB SERPL: 1.3 G/DL
ALP SERPL-CCNC: 62 U/L (ref 39–117)
ALT SERPL W P-5'-P-CCNC: 126 U/L (ref 1–41)
ANION GAP SERPL CALCULATED.3IONS-SCNC: 7.8 MMOL/L (ref 5–15)
APPEARANCE CSF: CLEAR
APPEARANCE CSF: CLEAR
APTT PPP: 40.8 SECONDS (ref 22.7–35.4)
AST SERPL-CCNC: 182 U/L (ref 1–40)
BILIRUB SERPL-MCNC: 0.2 MG/DL (ref 0.2–1.2)
BUN BLD-MCNC: 23 MG/DL (ref 8–23)
BUN/CREAT SERPL: 20.7 (ref 7–25)
CALCIUM SPEC-SCNC: 7.7 MG/DL (ref 8.6–10.5)
CHLORIDE SERPL-SCNC: 100 MMOL/L (ref 98–107)
CO2 SERPL-SCNC: 24.2 MMOL/L (ref 22–29)
COLOR CSF: COLORLESS
COLOR CSF: COLORLESS
CREAT BLD-MCNC: 1.11 MG/DL (ref 0.76–1.27)
CRP SERPL-MCNC: 0.04 MG/DL (ref 0–0.5)
DEPRECATED RDW RBC AUTO: 44.5 FL (ref 37–54)
ERYTHROCYTE [DISTWIDTH] IN BLOOD BY AUTOMATED COUNT: 13.6 % (ref 12.3–15.4)
ERYTHROCYTE [SEDIMENTATION RATE] IN BLOOD: 6 MM/HR (ref 0–20)
FERRITIN SERPL-MCNC: 578 NG/ML (ref 30–400)
FIBRINOGEN PPP-MCNC: 214 MG/DL (ref 219–464)
FSP PPP LA-ACNC: ABNORMAL
GFR SERPL CREATININE-BSD FRML MDRD: 66 ML/MIN/1.73
GLOBULIN UR ELPH-MCNC: 2.2 GM/DL
GLUCOSE BLD-MCNC: 99 MG/DL (ref 65–99)
GLUCOSE CSF-MCNC: 60 MG/DL (ref 40–70)
HCT VFR BLD AUTO: 40.5 % (ref 37.5–51)
HGB BLD-MCNC: 13.4 G/DL (ref 13–17.7)
LDH SERPL-CCNC: 404 U/L (ref 135–225)
LYMPHOCYTES # BLD MANUAL: 0.36 10*3/MM3 (ref 0.7–3.1)
LYMPHOCYTES NFR BLD MANUAL: 11 % (ref 5–12)
LYMPHOCYTES NFR BLD MANUAL: 32 % (ref 19.6–45.3)
MCH RBC QN AUTO: 29.8 PG (ref 26.6–33)
MCHC RBC AUTO-ENTMCNC: 33.1 G/DL (ref 31.5–35.7)
MCV RBC AUTO: 90 FL (ref 79–97)
METAMYELOCYTES NFR BLD MANUAL: 2 % (ref 0–0)
METHOD: ABNORMAL
METHOD: ABNORMAL
MONOCYTES # BLD AUTO: 0.12 10*3/MM3 (ref 0.1–0.9)
NEUTROPHILS # BLD AUTO: 0.62 10*3/MM3 (ref 1.7–7)
NEUTROPHILS NFR BLD MANUAL: 55 % (ref 42.7–76)
NRBC BLD AUTO-RTO: 0 /100 WBC (ref 0–0.2)
NUC CELL # CSF MANUAL: 2 /MM3 (ref 0–5)
NUC CELL # CSF MANUAL: 5 /MM3 (ref 0–5)
PLAT MORPH BLD: NORMAL
PLATELET # BLD AUTO: 71 10*3/MM3 (ref 140–450)
PLATELET # BLD AUTO: 77 10*3/MM3 (ref 140–450)
PLATELETS.RETICULATED NFR BLD AUTO: 5 % (ref 0.9–6.5)
PMV BLD AUTO: 11.1 FL (ref 6–12)
POTASSIUM BLD-SCNC: 4.5 MMOL/L (ref 3.5–5.2)
PROT CSF-MCNC: 65 MG/DL (ref 15–45)
PROT SERPL-MCNC: 5.1 G/DL (ref 6–8.5)
RBC # BLD AUTO: 4.5 10*6/MM3 (ref 4.14–5.8)
RBC # CSF MANUAL: 197 /MM3 (ref 0–0)
RBC # CSF MANUAL: 9 /MM3 (ref 0–0)
RBC MORPH BLD: NORMAL
SODIUM BLD-SCNC: 132 MMOL/L (ref 136–145)
TUBE # CSF: 1
TUBE # CSF: 4
WBC MORPH BLD: NORMAL
WBC NRBC COR # BLD: 1.13 10*3/MM3 (ref 3.4–10.8)

## 2019-07-02 PROCEDURE — 99222 1ST HOSP IP/OBS MODERATE 55: CPT | Performed by: INTERNAL MEDICINE

## 2019-07-02 PROCEDURE — 85730 THROMBOPLASTIN TIME PARTIAL: CPT | Performed by: INTERNAL MEDICINE

## 2019-07-02 PROCEDURE — 87070 CULTURE OTHR SPECIMN AEROBIC: CPT | Performed by: INTERNAL MEDICINE

## 2019-07-02 PROCEDURE — 85362 FIBRIN DEGRADATION PRODUCTS: CPT | Performed by: INTERNAL MEDICINE

## 2019-07-02 PROCEDURE — 85384 FIBRINOGEN ACTIVITY: CPT | Performed by: INTERNAL MEDICINE

## 2019-07-02 PROCEDURE — 80053 COMPREHEN METABOLIC PANEL: CPT | Performed by: INTERNAL MEDICINE

## 2019-07-02 PROCEDURE — 88184 FLOWCYTOMETRY/ TC 1 MARKER: CPT | Performed by: INTERNAL MEDICINE

## 2019-07-02 PROCEDURE — 77003 FLUOROGUIDE FOR SPINE INJECT: CPT

## 2019-07-02 PROCEDURE — 88185 FLOWCYTOMETRY/TC ADD-ON: CPT

## 2019-07-02 PROCEDURE — 25010000002 ONDANSETRON PER 1 MG: Performed by: INTERNAL MEDICINE

## 2019-07-02 PROCEDURE — 83615 LACTATE (LD) (LDH) ENZYME: CPT | Performed by: INTERNAL MEDICINE

## 2019-07-02 PROCEDURE — 85025 COMPLETE CBC W/AUTO DIFF WBC: CPT | Performed by: INTERNAL MEDICINE

## 2019-07-02 PROCEDURE — 86644 CMV ANTIBODY: CPT | Performed by: INTERNAL MEDICINE

## 2019-07-02 PROCEDURE — 94799 UNLISTED PULMONARY SVC/PX: CPT

## 2019-07-02 PROCEDURE — 86645 CMV ANTIBODY IGM: CPT | Performed by: INTERNAL MEDICINE

## 2019-07-02 PROCEDURE — 87015 SPECIMEN INFECT AGNT CONCNTJ: CPT | Performed by: INTERNAL MEDICINE

## 2019-07-02 PROCEDURE — 89050 BODY FLUID CELL COUNT: CPT | Performed by: INTERNAL MEDICINE

## 2019-07-02 PROCEDURE — 85055 RETICULATED PLATELET ASSAY: CPT | Performed by: INTERNAL MEDICINE

## 2019-07-02 PROCEDURE — 86665 EPSTEIN-BARR CAPSID VCA: CPT | Performed by: INTERNAL MEDICINE

## 2019-07-02 PROCEDURE — 86663 EPSTEIN-BARR ANTIBODY: CPT | Performed by: INTERNAL MEDICINE

## 2019-07-02 PROCEDURE — 88108 CYTOPATH CONCENTRATE TECH: CPT | Performed by: INTERNAL MEDICINE

## 2019-07-02 PROCEDURE — 84157 ASSAY OF PROTEIN OTHER: CPT | Performed by: INTERNAL MEDICINE

## 2019-07-02 PROCEDURE — 25010000003 LIDOCAINE 1 % SOLUTION: Performed by: RADIOLOGY

## 2019-07-02 PROCEDURE — 99223 1ST HOSP IP/OBS HIGH 75: CPT | Performed by: INTERNAL MEDICINE

## 2019-07-02 PROCEDURE — 87205 SMEAR GRAM STAIN: CPT | Performed by: INTERNAL MEDICINE

## 2019-07-02 PROCEDURE — 82728 ASSAY OF FERRITIN: CPT | Performed by: INTERNAL MEDICINE

## 2019-07-02 PROCEDURE — 85652 RBC SED RATE AUTOMATED: CPT | Performed by: INTERNAL MEDICINE

## 2019-07-02 PROCEDURE — 86140 C-REACTIVE PROTEIN: CPT | Performed by: INTERNAL MEDICINE

## 2019-07-02 PROCEDURE — 86664 EPSTEIN-BARR NUCLEAR ANTIGEN: CPT | Performed by: INTERNAL MEDICINE

## 2019-07-02 PROCEDURE — 009U3ZX DRAINAGE OF SPINAL CANAL, PERCUTANEOUS APPROACH, DIAGNOSTIC: ICD-10-PCS | Performed by: RADIOLOGY

## 2019-07-02 PROCEDURE — 82945 GLUCOSE OTHER FLUID: CPT | Performed by: INTERNAL MEDICINE

## 2019-07-02 RX ORDER — ZOLPIDEM TARTRATE 5 MG/1
5 TABLET ORAL NIGHTLY PRN
Status: DISCONTINUED | OUTPATIENT
Start: 2019-07-02 | End: 2019-07-04 | Stop reason: HOSPADM

## 2019-07-02 RX ORDER — LIDOCAINE HYDROCHLORIDE 10 MG/ML
10 INJECTION, SOLUTION INFILTRATION; PERINEURAL ONCE
Status: COMPLETED | OUTPATIENT
Start: 2019-07-02 | End: 2019-07-02

## 2019-07-02 RX ORDER — BUTALBITAL, ACETAMINOPHEN AND CAFFEINE 50; 325; 40 MG/1; MG/1; MG/1
1 TABLET ORAL EVERY 4 HOURS PRN
Status: DISCONTINUED | OUTPATIENT
Start: 2019-07-02 | End: 2019-07-04 | Stop reason: HOSPADM

## 2019-07-02 RX ADMIN — SODIUM CHLORIDE, PRESERVATIVE FREE 3 ML: 5 INJECTION INTRAVENOUS at 09:15

## 2019-07-02 RX ADMIN — ZOLPIDEM TARTRATE 5 MG: 5 TABLET ORAL at 22:50

## 2019-07-02 RX ADMIN — SODIUM CHLORIDE, PRESERVATIVE FREE 3 ML: 5 INJECTION INTRAVENOUS at 00:22

## 2019-07-02 RX ADMIN — BUTALBITAL, ACETAMINOPHEN, AND CAFFEINE 1 TABLET: 50; 325; 40 TABLET ORAL at 17:04

## 2019-07-02 RX ADMIN — ONDANSETRON 4 MG: 2 INJECTION INTRAMUSCULAR; INTRAVENOUS at 15:59

## 2019-07-02 RX ADMIN — LEVOTHYROXINE SODIUM 100 MCG: 100 TABLET ORAL at 07:29

## 2019-07-02 RX ADMIN — ACETAMINOPHEN 650 MG: 325 TABLET, FILM COATED ORAL at 09:14

## 2019-07-02 RX ADMIN — ONDANSETRON 4 MG: 2 INJECTION INTRAMUSCULAR; INTRAVENOUS at 09:24

## 2019-07-02 RX ADMIN — DOXYCYCLINE 100 MG: 100 INJECTION, POWDER, LYOPHILIZED, FOR SOLUTION INTRAVENOUS at 19:41

## 2019-07-02 RX ADMIN — SODIUM CHLORIDE 125 ML/HR: 9 INJECTION, SOLUTION INTRAVENOUS at 16:15

## 2019-07-02 RX ADMIN — LIDOCAINE HYDROCHLORIDE 2 ML: 10 INJECTION, SOLUTION INFILTRATION; PERINEURAL at 14:50

## 2019-07-02 RX ADMIN — DOXYCYCLINE 100 MG: 100 INJECTION, POWDER, LYOPHILIZED, FOR SOLUTION INTRAVENOUS at 09:05

## 2019-07-02 RX ADMIN — BUTALBITAL, ACETAMINOPHEN, AND CAFFEINE 1 TABLET: 50; 325; 40 TABLET ORAL at 22:49

## 2019-07-02 RX ADMIN — SODIUM CHLORIDE, PRESERVATIVE FREE 3 ML: 5 INJECTION INTRAVENOUS at 20:24

## 2019-07-02 NOTE — CONSULTS
Subjective     REASON FOR CONSULTATION: Leukopenia and thrombocytopenia  Provide an opinion on any further workup or treatment                             REQUESTING PHYSICIAN: Dr. Vieira    RECORDS OBTAINED:  Records of the patients history including those obtained from the referring provider were reviewed and summarized in detail.    HISTORY OF PRESENT ILLNESS:  The patient is a 68 y.o. year old male who is here for an opinion about the above issue.    History of Present Illness patient is a 68-year-old gentleman who was admitted.  He has Istria of Raynaud's phenomena.  Patient states that he had a thick attached to him on June 17, 2019 which he pulled.  Couple of days later he started having symptoms of fatigue and weakness and worsening low back pain.  This improved a few days later and he started having a fever.  The temperature was 100.3 with chills and felt bad.  He saw his primary care physician on June 27 who started him on doxycycline.  The next day he went to the primary care and had a CBC which showed a WBC count of 2000.  Further work-up showed CARMEL and double-stranded DNA were done.  He presented to the emergency room on June 30 which of starting stabbing left-sided chest pains with a temperature of 101.8.  He did travel to [place] and come back prior to being bitten by the tech.  Repeat work-up in the emergency room was done.  Diagnosis of tickborne illness was bait and serology for ehrlichiosis has been done.  He was placed on doxycycline and discharged but then he came back he became more confused and sleepy and sluggish.  The chest pain had resolved.    Looking at his blood counts is becoming more pancytopenic since June 27, 2019.  His hemoglobin was 15.2 and it stayed stable at 15.  But his white count was 2.79 it dropped down to 1.27 and his platelet dropped from 715-75.  His chest x-ray was negative.  CT head was negative.  There was concern that there is reactivation of CMV and Rudy-Barr  viral infection.    And underwent CT angiogram of the chest.  The pulmonary arteries well-developed opacified no pulmonary embolism.  The lungs were clear without any adenopathy.  CT abdomen pelvis was negative.  There is mild enlargement of the prostate 5.7 cm.    Infectious disease was concerned that leukopenia and thrombocytopenia were concerning for leukemia.  We were consulted because of that    Underwent CSF analysis today.  Flow was sent on the CSF and cytology was sent pending.  CSF protein is 65.,  CSF had 197 RBCs, 7 glucose is 60.  Ferritin 528.  HIV pending, Rudy-Barr virus pending and CMV pending, hepatitis profile nonreactive respiratory viral panel negative blood culture negative, lactic acid 0.02      CT head negative.  CBC 7 months ago was normal.  CARMEL negative.  Only recently her CBC is abnormal since this admission    Past Medical History:   Diagnosis Date   • Allergic rhinitis    • Arthritis    • Cancer (CMS/HCC)     skin   • Cervical spondylosis with myelopathy    • Elevated cholesterol    • FHx: colonic polyps    • GERD (gastroesophageal reflux disease)    • History of colonic polyps    • Hypertension     Benign Essential   • Hypothyroidism    • Insomnia    • Malaise and fatigue    • Pure hypercholesterolemia         Past Surgical History:   Procedure Laterality Date   • COLONOSCOPY N/A 11/04/2011   • COLONOSCOPY N/A 06/2005   • COLONOSCOPY N/A 11/15/2013   • CYST REMOVAL  2014   • FRACTURE SURGERY      right thumb   • HAND SURGERY Left     3rd finger   • SKIN BIOPSY      scalp        No current facility-administered medications on file prior to encounter.      Current Outpatient Medications on File Prior to Encounter   Medication Sig Dispense Refill   • atorvastatin (LIPITOR) 20 MG tablet Take 20 mg by mouth Daily.     • doxycycline (ADOXA) 100 MG tablet Take 1 tablet by mouth 2 (Two) Times a Day for 14 days. 28 tablet 0   • gabapentin (NEURONTIN) 300 MG capsule Take 1 capsule by mouth  Every Night. 15 capsule 0   • HYDROcodone-acetaminophen (NORCO) 5-325 MG per tablet Take 1 tablet by mouth Every 6 (Six) Hours As Needed for Moderate Pain . 8 tablet 0   • irbesartan (AVAPRO) 300 MG tablet Take 1 tablet by mouth Daily. 90 tablet 1   • levothyroxine (SYNTHROID, LEVOTHROID) 100 MCG tablet Take 1 tablet by mouth Daily. 90 tablet 1   • [] ondansetron (ZOFRAN) 4 MG tablet Take 1 tablet by mouth Every 8 (Eight) Hours As Needed for Nausea or Vomiting for up to 3 days. 30 tablet 0   • sildenafil (VIAGRA) 100 MG tablet Take 1 tablet by mouth Daily As Needed for erectile dysfunction. 10 tablet 3        ALLERGIES:  No Known Allergies     Social History     Socioeconomic History   • Marital status:      Spouse name: Not on file   • Number of children: 2   • Years of education: POST GRAD   • Highest education level: Not on file   Occupational History   • Occupation: RETIRED   Tobacco Use   • Smoking status: Never Smoker   • Smokeless tobacco: Never Used   Substance and Sexual Activity   • Alcohol use: Yes     Alcohol/week: 0.6 oz     Types: 1 Shots of liquor per week     Comment: 3-5 times a week   • Drug use: Defer   • Sexual activity: Defer   Social History Narrative    LIVES WITH SPOUSE        Family History   Problem Relation Age of Onset   • Hypertension Mother    • Heart failure Mother    • Hearing loss Mother    • Hyperlipidemia Mother    • Aortic aneurysm Father    • Arthritis Father    • Prostate cancer Brother    • Heart disease Brother    • Hypertension Brother    • Liver cancer Sister    • Breast cancer Sister    • Cancer Sister    • Hyperlipidemia Sister    • Cancer Maternal Uncle    • Heart disease Maternal Uncle         Review of Systems   Constitutional: Positive for fatigue and fever. Negative for appetite change, chills, diaphoresis and unexpected weight change.   HENT: Negative for hearing loss, sore throat and trouble swallowing.    Respiratory: Positive for shortness of breath.  Negative for cough, chest tightness and wheezing.    Cardiovascular: Negative for chest pain, palpitations and leg swelling.   Gastrointestinal: Negative for abdominal distention, abdominal pain, constipation, diarrhea, nausea and vomiting.   Genitourinary: Negative for dysuria, frequency, hematuria and urgency.   Musculoskeletal: Negative for joint swelling.        No muscle weakness.   Skin: Negative for rash and wound.   Neurological: Negative for seizures, syncope, speech difficulty, weakness, numbness and headaches.   Hematological: Negative for adenopathy. Does not bruise/bleed easily.   Psychiatric/Behavioral: Negative for behavioral problems, confusion and suicidal ideas.        Objective     Vitals:    07/02/19 1516 07/02/19 1539 07/02/19 1601 07/02/19 1700   BP: 120/80 131/88 127/91 119/81   BP Location: Left arm Right arm Right arm Right arm   Patient Position: Lying Lying Lying Lying   Pulse: 57 58 58 59   Resp: 16 16 16 14   Temp:  98.4 °F (36.9 °C) 98.4 °F (36.9 °C) 98.7 °F (37.1 °C)   TempSrc:  Oral Oral Oral   SpO2: 100%   91%   Weight:       Height:         No flowsheet data found.    Physical Exam      GENERAL:  Well-developed, well-nourished in no acute distress. .  NECK:  Supple with good range of motion; no thyromegaly or masses, no JVD.  LYMPHATICS:  No cervical, supraclavicular, axillary or inguinal adenopathy.  CHEST:  Lungs clear to auscultation. Good airflow.  CARDIAC:  Regular rate and rhythm without murmurs, rubs or gallops. Normal S1,S2.  ABDOMEN:  Soft, nontender with no hepatosplenomegaly or masses.  EXTREMITIES:  No clubbing, cyanosis or edema.  NEUROLOGICAL:  Cranial Nerves II-XII grossly intact.  No focal neurological deficits.  PSYCHIATRIC:  Normal affect and mood.        RECENT LABS:  Hematology WBC   Date Value Ref Range Status   07/02/2019 1.13 (C) 3.40 - 10.80 10*3/mm3 Final     RBC   Date Value Ref Range Status   07/02/2019 4.50 4.14 - 5.80 10*6/mm3 Final     Hemoglobin   Date  Value Ref Range Status   07/02/2019 13.4 13.0 - 17.7 g/dL Final     Hematocrit   Date Value Ref Range Status   07/02/2019 40.5 37.5 - 51.0 % Final     Platelets   Date Value Ref Range Status   07/02/2019 71 (L) 140 - 450 10*3/mm3 Final          Assessment/Plan     1.  Fever with neutropenia and thrombocytopenia currently being evaluated by infectious disease.  Patient's white count and platelet count were normal back in 7 months ago.  More recently he had a tick bite following the tick bite he started feeling sick with temperature of 100.3 and his white counts dropped down he has had a work-up by infectious disease so far and because of confusion has also had a spinal tap done.  He is getting more pancytopenic.  His CT head was negative so far and CT of the chest abdomen pelvis have been negative.  Will await all cultures.  Flow has been sent on the CSF and is pending.  On peripheral blood he had a few metamyelocytes but no evidence of any blasts.    If his counts keep trending down further we may need to consider bone marrow aspiration and biopsy but I believe this is likely infectious.  However we will discuss with infectious disease and follow along with you.    Elise Johnson MD

## 2019-07-02 NOTE — PAYOR COMM NOTE
"Teresa Montenegro (68 y.o. Male)                             ATTENTION;   AMADNEEP ESTEBAN, AUTH PENDING [id number] [phone]                            REPLY TO UR DEPT, SENTHIL BROOKE N  OR UR  [phone]         Date of Birth Social Security Number Address Home Phone MRN    1950  [id number] [address]  [id number]    Christianity Marital Status          Adventism        Admission Date Admission Type Admitting Provider Attending Provider Department, Room/Bed    7/1/19 Emergency Rosa Rosa MD McCracken, Robert Russell, MD [address], [address]/1    Discharge Date Discharge Disposition Discharge Destination                       Attending Provider:  Jomar Vieira MD    Allergies:  No Known Allergies    Isolation:  None   Infection:  None   Code Status:  CPR    Ht:  180.3 cm (71\")   Wt:  87.7 kg (193 lb 4.8 oz)    Admission Cmt:  None   Principal Problem:  Fever and chills [R50.9]                 Active Insurance as of 7/1/2019     Primary Coverage     Payor Plan Insurance Group Employer/Plan Group    AETNA MEDICARE REPLACEMENT AETNA [id number]     Payor Plan Address Payor Plan Phone Number Payor Plan Fax Number Effective Dates    [address] [phone]  1/1/2019 - None Entered    [facility] [id number]       Subscriber Name Subscriber Birth Date Member ID       TERESA MONTENEGRO 1950 [id number]                 Emergency Contacts      (Rel.) Home Phone Work Phone Mobile Phone    Yecenia Montenegro (Spouse) [phone] -- [phone]        LOC:Acute Adult-Infection: Sepsis by Joy Foss, RN        In Progress (Acute Met):  Reviewed on 7/2/2019 by Joy Foss, EULALIA        Created Using Review Status Review Entered   SecureAuth In Primary 7/2/2019 09:08      Criteria Set Name - Subset   LOC:Acute Adult-Infection: Sepsis      Criteria Review   REVIEW SUMMARY     Patient: TERESA MONTENEGRO  Review Number: [id number]  Review " Status: In Primary     Condition Specific: Yes        OUTCOMES  Outcome Type: Primary           REVIEW DETAILS     Product: LOC:Acute Adult  Subset: Infection: Sepsis      (Symptom or finding within 24h)         (Excludes PO medications unless noted)          [X] Select Day, One:              [X] Episode Day 1, One:                  [X] ACUTE, One:                      [X] Systemic infection (excludes viral), All:                          [X] Systemic infection (excludes viral)                          [X] Source unknown, All:                              [X] Chest x-ray, no pulmonary infiltrate                              [X] Physical exam negative or inconclusive                              [X] Urinalysis, All:                                  [X] < 10 WBC/mm3                                  [X] No bacteriuria                                  [X] Leukocyte esterase (-)                                  [X] Nitrite (-)                          [X] Sign or symptom, >= Two:                              [X] Temperature, One:                                  [X] > 99.4°F(37.4°C) PO                              [X] WBC, >= One:                                  [X] < 4,000/cu.mm(4x10 exp 9/L)                              [X] Mental status changes (excludes coma, stupor, or obtundation) or Telford Coma Scale 9-14                          [X] Risk factor, >= One:                              [X] Anorexia                              [X] Coexisting disease, >= One:                                  [X] Chronic renal disease                              [X] Immunocompromised                          [X] Anti-infective     Version: Elixent 2018.3  The[employer]pping[employer]® and [employer]®  © 2018 [employer] and/or one of its subsidiaries.  All Rights Reserved.  CPT only © 2017 American Medical Association.  All Rights Reserved.   Additional Notes   7/2/2019 inpatient admission orders per Rosa Rosa MD 7/2/2019   5:40 Am.  E-mail sent to [facility] data coordinators. EULALIA Machado Jawed, MD at 2019 11:16 PM          Internal medicine history and physical  INTERNAL MEDICINE   [facility]       Patient Identification:  Name: Anoop Montenegro  Age: 68 y.o.  Sex: male  :  1950  MRN: [id number]                   Primary Care Physician: Jimbo Hurley MD                                   Chief Complaint: Progressive weakness fever and episodes of confusion started gradually on 2019.    History of Present Illness:   Source of information patient patient's wife and patient's family member who works in cardiology unit as a nurse in this hospital.  Patient is a 68-year-old male with past medical history as noted below including history of Raynaud's for long period of time lives a very outdoor lifestyle.  [place] and his father is a week over there.  Patient and his wife admit that he may have had exposure to ticks in the past and have had removed ticks on him before but this specifically remember a tick attached to him on 2019 and it was pulled.  He is not sure how long the tick was attached to him.  Couple of days later according to the wife he started having symptoms of fatigue and weakness but and had worsening of his low back pain which improved a couple of days later and on 2019 he had a fever  Of 100.3 and chills and felt bad.  He saw his primary care physician/nurse practice on 2019 and was started on doxycycline.  He followed up again the next day with his primary care physician and was noted to have WBC count of 2000.  Further work-up was added including CARMEL with double-stranded DNA and other connective tissue disease profile.  Patient symptom continued to deteriorate and he was having more frequent episodes of confusion and recurrent fever.  He presented to the emergency room on 2019 first sharp stabbing left-sided chest pain that started  suddenly this is associated with temperature rising up to 101.8 him having intermittent headaches chills and nausea.  Patient did travel to [place] and came back prior to finding tick on him on 6/17/2019.  Repeat work-up in the emergency room corroborated with the previous work-up done at his primary care physician's office.  Diagnosis of tickborne illness was made serology for ehrlichiosis was added to his work-up.  Patient was discharged to follow-up with his primary care physician instructions to continue doxycycline.  Since he left the hospital yesterday he is becoming more confused at times and sleepy and sluggish.  Because of his worsening change in status despite being on doxycycline patient was brought into the emergency room.  His intermittent chest pain has resolved.  He was also exhibiting some skin sensitivity which is also improved.  As mentioned patient has rainouts phenomena but underlying autoimmune disorder was never identified.  Emergency room physician contacted infectious disease service on call and case was discussed with Dr. Sinha and and further lab work was ordered.  Past Medical History:  Past Medical History:   Diagnosis Date   • Allergic rhinitis    • Arthritis    • Cancer (CMS/HCC)     skin   • Cervical spondylosis with myelopathy    • Elevated cholesterol    • FHx: colonic polyps    • GERD (gastroesophageal reflux disease)    • History of colonic polyps    • Hypertension     Benign Essential   • Hypothyroidism    • Insomnia    • Malaise and fatigue    • Pure hypercholesterolemia      Past Surgical History:  Past Surgical History:   Procedure Laterality Date   • COLONOSCOPY N/A 11/04/2011   • COLONOSCOPY N/A 06/2005   • COLONOSCOPY N/A 11/15/2013   • CYST REMOVAL  2014   • FRACTURE SURGERY      right thumb   • HAND SURGERY Left     3rd finger   • SKIN BIOPSY      scalp      Home Meds:  Medications Prior to Admission   Medication Sig Dispense Refill Last Dose   • atorvastatin (LIPITOR) 20 MG  tablet Take 20 mg by mouth Daily.   6/30/2019 at Unknown time   • doxycycline (ADOXA) 100 MG tablet Take 1 tablet by mouth 2 (Two) Times a Day for 14 days. 28 tablet 0 7/1/2019 at am   • gabapentin (NEURONTIN) 300 MG capsule Take 1 capsule by mouth Every Night. 15 capsule 0 6/30/2019 at Unknown time   • HYDROcodone-acetaminophen (NORCO) 5-325 MG per tablet Take 1 tablet by mouth Every 6 (Six) Hours As Needed for Moderate Pain . 8 tablet 0 6/30/2019 at Unknown time   • irbesartan (AVAPRO) 300 MG tablet Take 1 tablet by mouth Daily. 90 tablet 1 6/30/2019 at Unknown time   • levothyroxine (SYNTHROID, LEVOTHROID) 100 MCG tablet Take 1 tablet by mouth Daily. 90 tablet 1 6/30/2019 at Unknown time   • ondansetron (ZOFRAN) 4 MG tablet Take 1 tablet by mouth Every 8 (Eight) Hours As Needed for Nausea or Vomiting for up to 3 days. 30 tablet 0 7/1/2019 at am   • sildenafil (VIAGRA) 100 MG tablet Take 1 tablet by mouth Daily As Needed for erectile dysfunction. 10 tablet 3 Taking     Current Meds:     Current Facility-Administered Medications:   •  acetaminophen (TYLENOL) tablet 650 mg, 650 mg, Oral, Q4H PRN, Rosa Rosa MD, 650 mg at 07/01/19 2220  •  [START ON 7/2/2019] doxycycline (VIBRAMYCIN) 100 mg/100 mL 0.9% NS MBP, 100 mg, Intravenous, Q12H, Rosa Rosa MD  •  [START ON 7/2/2019] levothyroxine (SYNTHROID, LEVOTHROID) tablet 100 mcg, 100 mcg, Oral, Daily, Rosa Rosa MD  •  nitroglycerin (NITROSTAT) SL tablet 0.4 mg, 0.4 mg, Sublingual, Q5 Min PRN, Rosa Rosa MD  •  ondansetron (ZOFRAN) injection 4 mg, 4 mg, Intravenous, Q6H PRN, Rosa Rosa MD, 4 mg at 07/01/19 2220  •  ondansetron (ZOFRAN) injection 4 mg, 4 mg, Intravenous, Q6H PRN, Rosa Rosa MD  •  [START ON 7/2/2019] sodium chloride 0.9 % flush 3 mL, 3 mL, Intravenous, Q12H, Rosa Rosa MD  •  sodium chloride 0.9 % flush 3-10 mL, 3-10 mL, Intravenous, PRN, Rosa Rosa MD  •  sodium chloride 0.9 % infusion, 125 mL/hr, Intravenous, Continuous, Moe,  "MD Rosa  Allergies:  No Known Allergies  Social History:   Social History     Tobacco Use   • Smoking status: Never Smoker   • Smokeless tobacco: Never Used   Substance Use Topics   • Alcohol use: Yes     Alcohol/week: 0.6 oz     Types: 1 Shots of liquor per week     Comment: 3-5 times a week      Family History:  Family History   Problem Relation Age of Onset   • Hypertension Mother    • Heart failure Mother    • Hearing loss Mother    • Hyperlipidemia Mother    • Aortic aneurysm Father    • Arthritis Father    • Prostate cancer Brother    • Heart disease Brother    • Hypertension Brother    • Liver cancer Sister    • Breast cancer Sister    • Cancer Sister    • Hyperlipidemia Sister    • Cancer Maternal Uncle    • Heart disease Maternal Uncle           Review of Systems  See history of present illness and past medical history.  Constitutional: Positive for fever (101.2). Negative for activity change and appetite change.   HENT: Negative for congestion and sore throat.    Eyes: Negative.    Respiratory: Negative for cough and shortness of breath.    Cardiovascular: Negative for chest pain and leg swelling.   Gastrointestinal: Positive for nausea. Negative for abdominal pain, diarrhea and vomiting.   Endocrine: Negative.    Genitourinary: Negative for decreased urine volume and dysuria.   Musculoskeletal: Positive for neck stiffness. Negative for neck pain.   Skin: Negative for rash and wound.   Allergic/Immunologic: Negative.    Neurological: Positive for headaches. Negative for weakness and numbness.   Hematological: Negative.    Psychiatric/Behavioral: Negative.    All other systems reviewed and are negative.          Vitals:   /84 (BP Location: Right arm, Patient Position: Lying)   Pulse 64   Temp 98.1 °F (36.7 °C) (Oral)   Resp 16   Ht 180.3 cm (71\")   Wt 87.7 kg (193 lb 4.8 oz)   SpO2 98%   BMI 26.96 kg/m²    I/O:     Intake/Output Summary (Last 24 hours) at 7/1/2019 0483  Last data filed at " 7/1/2019 1700  Gross per 24 hour   Intake 1000 ml   Output --   Net 1000 ml     Exam:  General Appearance:   Awake alert tired and ill-appearing male who does not appear to be toxic or septic.  Despite the fact that he can have a proper intelligent conversation and exhibit good memory he is distraught by the fact that he has episodes in which he cannot understand to put things together.   Head:    Normocephalic, without obvious abnormality, atraumatic   Eyes:    PERRL, conjunctiva/corneas clear, EOM's intact, both eyes   Ears:    Normal external ear canals, both ears   Nose:   Nares normal, septum midline, mucosa normal, no drainage    or sinus tenderness   Throat:   Lips, tongue, gums normal; oral mucosa pink and moist   Neck:   Supple, symmetrical, trachea midline, no adenopathy;     thyroid:  no enlargement/tenderness/nodules; no carotid    bruit or JVD   Back:     Symmetric, no curvature, ROM normal, no CVA tenderness   Lungs:     Clear to auscultation bilaterally, respirations unlabored   Chest Wall:    No tenderness or deformity    Heart:    Regular rate and rhythm, S1 and S2 normal, no murmur, rub   or gallop   Abdomen:     Soft, non-tender, bowel sounds active all four quadrants,     no masses, no hepatomegaly, no splenomegaly   Extremities:   Extremities normal, atraumatic, no cyanosis or edema has skin changes involving his hand and feet suggestive of evolving scleroderma.   Pulses:   Pulses palpable in all extremities; symmetric all extremities   Skin:   Skin color normal, Skin is warm and dry,  no rashes or palpable lesions   Neurologic:   CNII-XII intact, motor strength grossly intact, sensation grossly intact to light touch, no focal deficits noted       Data Review:      I reviewed the patient's new clinical results.  Results from last 7 days   Lab Units 07/01/19  1634 06/30/19  0834 06/28/19  1436 06/27/19  1023   WBC 10*3/mm3 1.27* 1.49* 1.80* 2.79*   HEMOGLOBIN g/dL 15.0 15.1 15.2 15.2   PLATELETS  10*3/mm3 75* 150 172 215     Results from last 7 days   Lab Units 07/01/19  1634 06/30/19  0834 06/28/19  1434   SODIUM mmol/L 131* 134* 130*   POTASSIUM mmol/L 4.5 4.9 4.5   CHLORIDE mmol/L 95* 101 93*   TOTAL CO2 mmol/L  --   --  24.5   CO2 mmol/L 25.3 21.1*  --    BUN mg/dL 26* 23 16   CREATININE mg/dL 1.31* 1.25 1.26   CALCIUM mg/dL 8.4* 8.2* 8.7   GLUCOSE mg/dL 123* 102*  --      Xr Chest 2 View    Result Date: 7/1/2019   No active disease in the chest.  This report was finalized on 7/1/2019 5:54 PM by Dr. Ryan Yoo M.D.      Ct Head Without Contrast    Result Date: 7/1/2019   No evidence for acute intracranial pathology.  Radiation dose reduction techniques were utilized, including automated exposure control and exposure modulation based on body size.  This report was finalized on 7/1/2019 8:22 PM by Dr. Ryan Yoo M.D.        Assessment:  Active Hospital Problems    Diagnosis POA   • **Fever and chills [R50.9] Yes   • Leukopenia [D72.819] Unknown   • Thrombocytopenia (CMS/HCC) [D69.6] Unknown   • Raynaud's phenomenon without gangrene [I73.00] Unknown   • Benign essential HTN [I10] Yes   • Cervical spinal stenosis [M48.02] Yes   • DDD (degenerative disc disease), cervical [M50.30] Yes     Impression: 01/30/2015 - x;          Medical decision making:  Progressive febrile illness with multitude of symptoms ranging from body aches to episodic chest pain 2 headaches and fever with decreased appetite and progressive decline in white blood cell count and platelet count with mildly abnormal LFTs in the context of significant exposure few weeks ago.  This presentation is consistent with probable tickborne illness and likely ehrlichiosis.  Other possibilities to consider would be reactivation CMV or EBV viral infection manifesting as leukopenia and transaminitis with systemic fever and chills versus evolving hematological disorder such as a leukemic leukemia.  Overt manifestation of underlying undiagnosed  "involving autoimmune disorders such as lupus given the fact that he has history of Isacc's need to be considered.  Plan is to admit the patient follow-up on various work-up sent by the primary care physician's office few days ago as well as Ehrlichia serology.  Continue with IV doxycycline and provide him with supportive care and symptom management.  Dr. Paige Sinha has been consulted by emergency room physician and will request her for continued assistance in this situation.  Hypertension-continue antihypertensive regimen  Dehydration with rising creatinine and hyponatremia-provided with IV fluids and avoid nephrotoxic agents.  Patient did have CT angiogram done on 6/30/2019 and this change in creatinine could very well be due to contrast-induced effect and need to be monitored.    Rosa Rosa MD   7/1/2019  11:16 PM  Much of this encounter note is an electronic transcription/translation of spoken language to printed text. The electronic translation of spoken language may permit erroneous, or at times, nonsensical words or phrases to be inadvertently transcribed; Although I have reviewed the note for such errors, some may still exist      Electronically signed by Rosa Rosa MD at 7/2/2019  5:59 AM          Emergency Department Notes      Degonda, Janet, RN at 7/1/2019  3:45 PM        Has been sleeping all day - is confused.  Had a hard time waking him. Yesterday wbc 1.4 here.      Electronically signed by Degonda, Janet, RN at 7/1/2019  3:46 PM     Jose Lemus MD at 7/1/2019  4:03 PM          EMERGENCY DEPARTMENT ENCOUNTER    Room Number:  12/12  PCP: Jimbo Hurley MD  Historian: Patient  History Limited By: None      HPI  Chief Complaint: Fever  Context: Anoop Montenegro is a 68 y.o. male who presents to the ED c/o intermittent low-grade fever that began 6 days ago, and worsened today when the patient \"slept all day, and was acting confused.\" Per wife, the patient had a fever of 101.2 today that has " "since resolved. Pt reports nausea w/o vomiting, intermittent headache, and mild neck stiffness. He denies abdominal pain, and diarrhea. Per wife, [place] Pt's wife reports the patient had a fever of 101.8 and chills on 6/26/19 and 6/27/19. Pt was seen by PCP on 6/30/19 and was prescribed doxycycline, which the patient has been taking without improvement. Pt returned to PCP's office the following day (6/31/19) with c/o increased skin sensitivity and \"stabbing left-sided chest pain\" that has since resolved. Pt's wife reports the pt was seen in the ED yesterday by Dr. Ash and had a CTA done that was negative. Wife reports the pt's WBC has decreased from 2.9 (6/26/19) to 1.8 (6/28/19).      101.2 fever this morning / 99 this afternoon  Location: generalized  Radiation: none  Character: low grade  Duration: 6 days  Severity: mild  Progression: unchanged  Aggravating Factors: none  Alleviating Factors: none        MEDICAL RECORD REVIEW    Pt was seen in the ED on 6/30/19 by Dr. Mihir MD for chest pain and fever, and was discharged with diagnosis of tick-borne illness.    Pt was seen by his PCP (DONNIE Márquez) on 6/28/19 with c/o malaise and low grade fever. Pt diagnosed with neuralgia, fever/chills, and nausea.       Pt was seen by PCP (DONNIE Wesley) on 6/27/19 with c/o of back pain and tick-borne illness. Pt started on 100 mg doxycycline at this time.    PAST MEDICAL HISTORY  Active Ambulatory Problems     Diagnosis Date Noted   • Cervical spinal stenosis 05/25/2016   • DDD (degenerative disc disease), cervical 05/25/2016   • Benign essential HTN 11/02/2017   • Acute non-recurrent maxillary sinusitis 01/02/2019   • Cough 01/02/2019     Resolved Ambulatory Problems     Diagnosis Date Noted   • No Resolved Ambulatory Problems     Past Medical History:   Diagnosis Date   • Allergic rhinitis    • Cervical spondylosis with myelopathy    • " FHx: colonic polyps    • GERD (gastroesophageal reflux disease)    • History of colonic polyps    • Hypertension    • Hypothyroidism    • Insomnia    • Malaise and fatigue    • Pure hypercholesterolemia          PAST SURGICAL HISTORY  Past Surgical History:   Procedure Laterality Date   • COLONOSCOPY N/A 11/04/2011   • COLONOSCOPY N/A 06/2005   • COLONOSCOPY N/A 11/15/2013   • CYST REMOVAL  2014   • HAND SURGERY Left     3rd finger         FAMILY HISTORY  Family History   Problem Relation Age of Onset   • Hypertension Mother    • Heart failure Mother    • Aortic aneurysm Father    • Prostate cancer Brother    • Heart disease Brother    • Hypertension Brother    • Liver cancer Sister    • Breast cancer Sister          SOCIAL HISTORY  Social History     Socioeconomic History   • Marital status:      Spouse name: Not on file   • Number of children: 2   • Years of education: POST GRAD   • Highest education level: Not on file   Occupational History   • Occupation: RETIRED   Tobacco Use   • Smoking status: Never Smoker   • Smokeless tobacco: Never Used   Substance and Sexual Activity   • Alcohol use: Yes     Comment: social   • Drug use: Defer   • Sexual activity: Defer   Social History Narrative    LIVES WITH SPOUSE         ALLERGIES  Patient has no known allergies.        REVIEW OF SYSTEMS  Review of Systems   Constitutional: Positive for fever (101.2). Negative for activity change and appetite change.   HENT: Negative for congestion and sore throat.    Eyes: Negative.    Respiratory: Negative for cough and shortness of breath.    Cardiovascular: Negative for chest pain and leg swelling.   Gastrointestinal: Positive for nausea. Negative for abdominal pain, diarrhea and vomiting.   Endocrine: Negative.    Genitourinary: Negative for decreased urine volume and dysuria.   Musculoskeletal: Positive for neck stiffness. Negative for neck pain.   Skin: Negative for rash and wound.   Allergic/Immunologic: Negative.     Neurological: Positive for headaches. Negative for weakness and numbness.   Hematological: Negative.    Psychiatric/Behavioral: Negative.    All other systems reviewed and are negative.           PHYSICAL EXAM  ED Triage Vitals [07/01/19 1546]   Temp Heart Rate Resp BP SpO2   98 °F (36.7 °C) 64 16 -- 96 %      Temp src Heart Rate Source Patient Position BP Location FiO2 (%)   Tympanic Monitor -- -- --       Physical Exam   Constitutional: He is oriented to person, place, and time and well-developed, well-nourished, and in no distress. No distress.   HENT:   Head: Normocephalic and atraumatic.   Eyes: EOM are normal. Pupils are equal, round, and reactive to light.   Neck: Normal range of motion. Neck supple. No Brudzinski's sign and no Kernig's sign noted.   Cardiovascular: Normal rate, regular rhythm and normal heart sounds. Exam reveals no gallop and no friction rub.   No murmur heard.  Pulmonary/Chest: Effort normal and breath sounds normal. No respiratory distress. He has no wheezes. He has no rales.   Abdominal: Soft. There is no tenderness. There is no rebound and no guarding.   Musculoskeletal: Normal range of motion. He exhibits no edema.   Neurological: He is alert and oriented to person, place, and time. He has normal sensation and normal strength.   Skin: Skin is warm and dry.   Psychiatric: Mood and affect normal.   Nursing note and vitals reviewed.          LAB RESULTS  Recent Results (from the past 24 hour(s))   Comprehensive Metabolic Panel    Collection Time: 07/01/19  4:34 PM   Result Value Ref Range    Glucose 123 (H) 65 - 99 mg/dL    BUN 26 (H) 8 - 23 mg/dL    Creatinine 1.31 (H) 0.76 - 1.27 mg/dL    Sodium 131 (L) 136 - 145 mmol/L    Potassium 4.5 3.5 - 5.2 mmol/L    Chloride 95 (L) 98 - 107 mmol/L    CO2 25.3 22.0 - 29.0 mmol/L    Calcium 8.4 (L) 8.6 - 10.5 mg/dL    Total Protein 6.4 6.0 - 8.5 g/dL    Albumin 3.50 3.50 - 5.20 g/dL    ALT (SGPT) 109 (H) 1 - 41 U/L    AST (SGOT) 173 (H) 1 - 40  U/L    Alkaline Phosphatase 72 39 - 117 U/L    Total Bilirubin 0.3 0.2 - 1.2 mg/dL    eGFR Non African Amer 55 (L) >60 mL/min/1.73    Globulin 2.9 gm/dL    A/G Ratio 1.2 g/dL    BUN/Creatinine Ratio 19.8 7.0 - 25.0    Anion Gap 10.7 5.0 - 15.0 mmol/L   Lactic Acid, Plasma    Collection Time: 07/01/19  4:34 PM   Result Value Ref Range    Lactate 0.8 0.5 - 2.0 mmol/L   Procalcitonin    Collection Time: 07/01/19  4:34 PM   Result Value Ref Range    Procalcitonin 0.08 (L) 0.10 - 0.25 ng/mL   CBC Auto Differential    Collection Time: 07/01/19  4:34 PM   Result Value Ref Range    WBC 1.27 (C) 3.40 - 10.80 10*3/mm3    RBC 4.93 4.14 - 5.80 10*6/mm3    Hemoglobin 15.0 13.0 - 17.7 g/dL    Hematocrit 43.9 37.5 - 51.0 %    MCV 89.0 79.0 - 97.0 fL    MCH 30.4 26.6 - 33.0 pg    MCHC 34.2 31.5 - 35.7 g/dL    RDW 13.7 12.3 - 15.4 %    RDW-SD 44.4 37.0 - 54.0 fl    MPV 11.5 6.0 - 12.0 fL    Platelets 75 (L) 140 - 450 10*3/mm3   Manual Differential    Collection Time: 07/01/19  4:34 PM   Result Value Ref Range    Neutrophil % 89.0 (H) 42.7 - 76.0 %    Lymphocyte % 5.0 (L) 19.6 - 45.3 %    Monocyte % 2.0 (L) 5.0 - 12.0 %    Basophil % 2.0 (H) 0.0 - 1.5 %    Metamyelocyte % 1.0 (H) 0.0 - 0.0 %    Atypical Lymphocyte % 1.0 0.0 - 5.0 %    Neutrophils Absolute 1.13 (L) 1.70 - 7.00 10*3/mm3    Lymphocytes Absolute 0.06 (L) 0.70 - 3.10 10*3/mm3    Monocytes Absolute 0.03 (L) 0.10 - 0.90 10*3/mm3    Basophils Absolute 0.03 0.00 - 0.20 10*3/mm3    nRBC 1.0 (H) 0.0 - 0.2 /100 WBC    RBC Morphology Normal Normal    WBC Morphology Normal Normal    Platelet Morphology Normal Normal       Ordered the above labs and reviewed the results.        RADIOLOGY  CT Head Without Contrast   Preliminary Result       No evidence for acute intracranial pathology.        Radiation dose reduction techniques were utilized, including automated   exposure control and exposure modulation based on body size.              XR Chest 2 View   Final Result       No active  disease in the chest.       This report was finalized on 7/1/2019 5:54 PM by Dr. Ryan Yoo M.D.               Ordered the above noted radiological studies. Reviewed by me in PACS.          PROCEDURES  Procedures      MEDICATIONS GIVEN IN ER  Medications   doxycycline (VIBRAMYCIN) 100 mg/100 mL 0.9% NS MBP (100 mg Intravenous New Bag 7/1/19 1901)   sodium chloride 0.9 % bolus 1,000 mL (1,000 mL Intravenous Currently Infusing 7/1/19 1852)   ondansetron (ZOFRAN) injection 4 mg (4 mg Intravenous Given 7/1/19 1747)   ibuprofen (ADVIL,MOTRIN) tablet 400 mg (400 mg Oral Given 7/1/19 1912)             PROGRESS AND CONSULTS  ED Course as of Jul 01 1912   Mon Jul 01, 2019 1856 6:56 PM  Patient here for fevers.  Has history of tick bites and has been on doxycycline since Thursday.  Has had continued fevers.  States has been having headaches and some confusion with the fever but gets better when the fever gets better.  Discussed with Dr. Sinha and other lab work added.  Given fluids and IV doxycycline.  Discussed with Dr. Rosa who will admit.  [SL]      ED Course User Index  [SL] Jose Lemus MD   1617 Ordered lab work and CXR for evaluation.    1740 Ordered CT head for evaluation.    1748 Placed call to infectious disease for consult.    1805 Rechecked the patient who is resting comfortably and in NAD. Patient is stable. BP- 111/81 HR- 60 Temp- 98 °F (36.7 °C) (Tympanic) O2 sat- 97%. Informed the patient of labs and imaging, and consult with ID. Discussed the plan for admission for further evaluation and management. Pt understands and agrees with the plan, all questions answered.    1816 Placed call to [facility] for admission.    1819 Reviewed pt's case with Dr. Paige Sinha, infectious disease, who agrees with the plan for admission as the pt's labs appear consistent with tick-borne illness.    1823 Reviewed the patient's case with Dr. Rosa, [facility], who will admit the patient.    MEDICAL DECISION MAKING      MDM  Number of  Diagnoses or Management Options     Amount and/or Complexity of Data Reviewed  Clinical lab tests: ordered and reviewed (WBC: 1.27; platelets: 75; BUN: 26; creatinine: 1.31)  Tests in the radiology section of CPT®:  ordered and reviewed (CT head: No evidence for acute intracranial pathology.   CXR: No active disease in the chest.)  Decide to obtain previous medical records or to obtain history from someone other than the patient: yes (Epic)  Review and summarize past medical records: yes (Pt was seen in the ED on 6/30/19 by Dr. Mihir MD for chest pain and fever, and was discharged with diagnosis of tick-borne illness.    Pt was seen by his PCP (DONNIE Márquez) on 6/28/19 with c/o malaise and low grade fever. Pt diagnosed with neuralgia, fever/chills, and nausea.     Pt was seen by PCP (DONNIE Wesley) on 6/27/19 with c/o of back pain and tick-borne illness. Pt started on 100 mg doxycycline at this time.)  Independent visualization of images, tracings, or specimens: yes (Dr. Fredo MD)               DIAGNOSIS  Final diagnoses:   Fever and chills   Leukopenia, unspecified type   Thrombocytopenia (CMS/HCC)   Generalized weakness           DISPOSITION  ADMISSION    Discussed treatment plan and reason for admission with pt/family and admitting physician.  Pt/family voiced understanding of the plan for admission for further testing/treatment as needed.       Latest Documented Vital Signs:  As of 7:12 PM  BP- 116/79 HR- 55 Temp- 98 °F (36.7 °C) (Tympanic) O2 sat- 97%        --  Documentation assistance provided by loulou Whittington for Dr. Allan MD.  Information recorded by the loulou was done at my direction and has been verified and validated by me.       Deirdre Whittington  07/01/19 1912       Jose Lemus MD  07/01/19 2222      Electronically signed by Jose Lemus MD at 7/1/2019 10:22 PM     Whit Lisa RN at 7/1/2019  6:25 PM        HIV and hepatitis lab work added on      Whit Lisa,  RN  07/01/19 1855      Electronically signed by Whit Lisa RN at 7/1/2019  6:52 PM       ICU Vital Signs     Row Name 07/02/19 1601 07/02/19 1539 07/02/19 1516 07/02/19 1359 07/02/19 1304       Vitals    Temp  98.4 °F (36.9 °C)  98.4 °F (36.9 °C)  --  --  98.3 °F (36.8 °C)    Temp src  Oral  Oral  --  --  Oral    Pulse  58  58  57  73  64    Heart Rate Source  Monitor  Monitor  Monitor  Monitor  Monitor    Resp  16  16  16  16  16    Resp Rate Source  Visual  Visual  Visual  --  Visual    BP  127/91  131/88  120/80  140/93  121/91    BP Location  Right arm  Right arm  Left arm  Left arm  Left arm    BP Method  Automatic  Automatic  Automatic  Automatic  Automatic    Patient Position  Lying  Lying  Lying  Lying  Sitting       Oxygen Therapy    SpO2  --  --  100 %  99 %  --    Pulse Oximetry Type  --  --  Intermittent  Intermittent  --    Device (Oxygen Therapy)  room air  --  room air  room air  room air       Patient Observation    Observations  --  --  -- Bandaide dry, intact to lower back. States some soreness at site  --  --    Row Name 07/02/19 1159 07/02/19 0927 07/02/19 0914 07/02/19 0722 07/02/19 0023       Vitals    Temp  --  100.1 °F (37.8 °C)  --  97.8 °F (36.6 °C)  --    Temp src  --  Oral  --  Oral  --    Pulse  78  64  --  55  --    Heart Rate Source  Monitor  Monitor  --  Monitor  --    Resp  16  16  --  20  --    Resp Rate Source  Visual  Visual  --  Visual  --    BP  108/92 pt c//o feeling dizzy  123/91  --  133/79  --    BP Location  Right arm  Right arm  --  Right arm  --    BP Method  Automatic  Automatic  --  Automatic  --    Patient Position  Sitting  Lying  --  Lying  --       Oxygen Therapy    Device (Oxygen Therapy)  room air  --  room air  room air  room air    Row Name 07/01/19 2331 07/01/19 2130 07/01/19 2020 07/01/19 1934 07/01/19 1833       Height and Weight    Weight  --  --  87.7 kg (193 lb 4.8 oz)  --  --    Weight Method  --  --  Bed scale  --  --       Vitals    Temp  97.5 °F  (36.4 °C)  --  98.1 °F (36.7 °C)  --  --    Temp src  Oral  --  Oral  --  --    Pulse  51  --  64  57  55    Heart Rate Source  Monitor  --  Monitor  --  --    Resp  16  --  16  16  --    Resp Rate Source  Visual  --  Visual  --  --    BP  112/60  --  130/84  125/77  116/79    Noninvasive MAP (mmHg)  --  --  --  89  93    BP Location  Right arm  --  Right arm  --  --    BP Method  Automatic  --  Automatic  --  --    Patient Position  Lying  --  Lying  --  --       Oxygen Therapy    SpO2  --  --  --  98 %  --    Device (Oxygen Therapy)  room air  room air  room air  --  --    Row Name 07/01/19 16:34:19                   Height and Weight    Weight  86.7 kg (191 lb 3.2 oz)        Weight Method  Bed scale           Vitals    Pulse  60        Heart Rate Source  Monitor        Resp  15        BP  111/81           Oxygen Therapy    SpO2  97 %            Lines, Drains & Airways    Active LDAs     Name:   Placement date:   Placement time:   Site:   Days:    Peripheral IV 07/01/19 1633 Left Antecubital   07/01/19    1633    Antecubital   less than 1    Peripheral IV 07/01/19 2356 Anterior;Proximal;Right Forearm   07/01/19    2356    Forearm   less than 1         Inactive LDAs     Name:   Placement date:   Placement time:   Removal date:   Removal time:   Site:   Days:    [REMOVED] Peripheral IV 06/30/19 0839 Right Forearm   06/30/19    0839    06/30/19    1214    Forearm   less than 1                Hospital Medications (all)       Dose Frequency Start End    acetaminophen (TYLENOL) tablet 650 mg 650 mg Every 4 Hours PRN 7/1/2019     Sig - Route: Take 2 tablets by mouth Every 4 (Four) Hours As Needed for Mild Pain . - Oral    butalbital-acetaminophen-caffeine (FIORICET, ESGIC) -40 MG per tablet 1 tablet 1 tablet Every 4 Hours PRN 7/2/2019     Sig - Route: Take 1 tablet by mouth Every 4 (Four) Hours As Needed for Headache. - Oral    doxycycline (VIBRAMYCIN) 100 mg/100 mL 0.9% NS  mg Once 7/1/2019 7/1/2019    Sig  - Route: Infuse 100 mL into a venous catheter 1 (One) Time. - Intravenous    doxycycline (VIBRAMYCIN) 100 mg/100 mL 0.9% NS  mg Every 12 Hours 7/2/2019 7/7/2019    Sig - Route: Infuse 100 mL into a venous catheter Every 12 (Twelve) Hours. - Intravenous    Hold medication 1 each Continuous PRN 7/2/2019     Sig - Route: 1 each Continuous As Needed (Lumbar Puncture). - Does not apply    ibuprofen (ADVIL,MOTRIN) tablet 400 mg 400 mg Once 7/1/2019 7/1/2019    Sig - Route: Take 1 tablet by mouth 1 (One) Time. - Oral    levothyroxine (SYNTHROID, LEVOTHROID) tablet 100 mcg 100 mcg Every Early Morning 7/2/2019     Sig - Route: Take 1 tablet by mouth Every Morning. - Oral    lidocaine (XYLOCAINE) 1 % injection 10 mL 10 mL Once 7/2/2019 7/2/2019    Sig - Route: Inject 10 mL into the appropriate area of the skin as directed by provider 1 (One) Time. - Intradermal    Cosign for Ordering: Accepted by Efrem Morris MD on 7/2/2019  3:32 PM    nitroglycerin (NITROSTAT) SL tablet 0.4 mg 0.4 mg Every 5 Minutes PRN 7/1/2019     Sig - Route: Place 1 tablet under the tongue Every 5 (Five) Minutes As Needed for Chest Pain (Chest Pain With Systolic Blood Pressure Greater Than 100). - Sublingual    ondansetron (ZOFRAN) injection 4 mg 4 mg Once 7/1/2019 7/1/2019    Sig - Route: Infuse 2 mL into a venous catheter 1 (One) Time. - Intravenous    ondansetron (ZOFRAN) injection 4 mg 4 mg Every 6 Hours PRN 7/1/2019     Sig - Route: Infuse 2 mL into a venous catheter Every 6 (Six) Hours As Needed for Nausea or Vomiting. - Intravenous    sodium chloride 0.9 % bolus 1,000 mL 1,000 mL Once 7/1/2019 7/1/2019    Sig - Route: Infuse 1,000 mL into a venous catheter 1 (One) Time. - Intravenous    sodium chloride 0.9 % flush 3 mL 3 mL Every 12 Hours Scheduled 7/2/2019     Sig - Route: Infuse 3 mL into a venous catheter Every 12 (Twelve) Hours. - Intravenous    sodium chloride 0.9 % flush 3-10 mL 3-10 mL As Needed 7/1/2019     Sig - Route: Infuse  3-10 mL into a venous catheter As Needed for Line Care. - Intravenous    sodium chloride 0.9 % infusion 125 mL/hr Continuous 7/1/2019 7/6/2019    Sig - Route: Infuse 125 mL/hr into a venous catheter Continuous. - Intravenous    zolpidem (AMBIEN) tablet 5 mg 5 mg Nightly PRN 7/2/2019 7/12/2019    Sig - Route: Take 1 tablet by mouth At Night As Needed for Sleep. - Oral    ondansetron (ZOFRAN) injection 4 mg (Discontinued) 4 mg Every 6 Hours PRN 7/1/2019 7/1/2019    Sig - Route: Infuse 2 mL into a venous catheter Every 6 (Six) Hours As Needed for Nausea or Vomiting. - Intravenous    Reason for Discontinue: Duplicate order          Orders (last 24 hrs)     Start     Ordered    07/03/19 0600  TSH  Morning Draw      07/02/19 1232    07/03/19 0600  Vitamin B12  Morning Draw      07/02/19 1232    07/03/19 0600  Folate  Morning Draw      07/02/19 1232    07/03/19 0600  CBC & Differential  Morning Draw      07/02/19 1232    07/03/19 0600  Comprehensive Metabolic Panel  Morning Draw      07/02/19 1232    07/03/19 0600  Magnesium  Morning Draw      07/02/19 1232    07/02/19 1603  zolpidem (AMBIEN) tablet 5 mg  Nightly PRN      07/02/19 1603    07/02/19 1603  butalbital-acetaminophen-caffeine (FIORICET, ESGIC) -40 MG per tablet 1 tablet  Every 4 Hours PRN      07/02/19 1603    07/02/19 1600  Vital Signs - Every 1 Hour x4  Every Hour      07/02/19 1527    07/02/19 1600  Vital Signs - Every 4 Hours  Every 4 Hours      07/02/19 1527    07/02/19 1545  lidocaine (XYLOCAINE) 1 % injection 10 mL  Once      07/02/19 1446    07/02/19 1528  Obtain Informed Consent  Once      07/02/19 1527    07/02/19 1528  Have Patient Void Prior to Procedure  Once      07/02/19 1527    07/02/19 1528  Verify Discontinuation of Anticoagulants  Once     Comments:  Unless Designated by Physician, Discontinue Anticoagulants Prior to Procedure:  - Aspirin & Plavix 5-7 Days  - Coumadin (Warfarin) 3-5 Days; If Patient Has Taken Within 72 Hours of Exam,  Order INR Prior to Appointment  - Lovenox 12 Hours  - Heparin 4 Hours    07/02/19 1527    07/02/19 1528  Vital Signs  Once      07/02/19 1527    07/02/19 1528  Pulse Oximetry  Once      07/02/19 1527    07/02/19 1528  Vital Signs (Specify Frequency)  Once      07/02/19 1527    07/02/19 1528  Bed Rest  Until Discontinued     Comments:  For    07/02/19 1527    07/02/19 1528  Discharge Patient (Specify Hours)  Once      07/02/19 1527    07/02/19 1446  Glucose, CSF - Cerebrospinal Fluid, Lumbar Puncture  STAT      07/02/19 1445    07/02/19 1446  Protein, CSF - Cerebrospinal Fluid, Lumbar Puncture  STAT      07/02/19 1445    07/02/19 1446  Cell Count With Differential, CSF Use tube: 1  STAT     Comments:  Specimen Collected from 1st Tube      07/02/19 1445    07/02/19 1446  Cell Count With Differential, CSF  STAT     Comments:  Specimen Collected from 4th Tube      07/02/19 1445    07/02/19 1446  Non-gynecologic Cytology  STAT      07/02/19 1445    07/02/19 1446  Culture, CSF - Cerebrospinal Fluid, Lumbar Puncture  STAT      07/02/19 1445    07/02/19 1446  Flow Cytometry  STAT      07/02/19 1445    07/02/19 1446  Cell Count, CSF - Cerebrospinal Fluid, Lumbar Puncture  PROCEDURE ONCE     Comments:  Specimen Collected from 1st Tube      07/02/19 1445    07/02/19 1446  Cell Count, CSF - Cerebrospinal Fluid, Lumbar Puncture  PROCEDURE ONCE     Comments:  Specimen Collected from 4th Tube      07/02/19 1445    07/02/19 1359  Obtain Informed Consent  Once      07/02/19 1358    07/02/19 1149  Hematology & Oncology Inpatient Consult  Once     Specialty:  Hematology and Oncology  Provider:  Nestor Rhoades Jr., MD    07/02/19 1148    07/02/19 1148  IR Lumbar Puncture Diag/Thera  1 Time Imaging      07/02/19 1148    07/02/19 1147  Hold medication  Continuous PRN      07/02/19 1148    07/02/19 0859  Manual Differential  Once      07/02/19 0858    07/02/19 0700  doxycycline (VIBRAMYCIN) 100 mg/100 mL 0.9% NS MBP  Every 12 Hours       07/01/19 2313    07/02/19 0617  Manual Differential  Once,   Status:  Canceled      07/02/19 0616    07/02/19 0600  levothyroxine (SYNTHROID, LEVOTHROID) tablet 100 mcg  Every Early Morning      07/01/19 2313    07/02/19 0600  CBC Auto Differential  Morning Draw      07/01/19 2314    07/02/19 0600  Comprehensive Metabolic Panel  Morning Draw      07/01/19 2314    07/02/19 0600  Ferritin  Morning Draw      07/01/19 2314    07/02/19 0000  Vital Signs  Every 4 Hours      07/01/19 2314    07/02/19 0000  sodium chloride 0.9 % flush 3 mL  Every 12 Hours Scheduled      07/01/19 2314    07/01/19 2316  Inpatient Infectious Diseases Consult  Once     Specialty:  Infectious Diseases  Provider:  Paige Sinha MD    07/01/19 2315 07/01/19 2315  Diet Regular; Cardiac  Diet Effective Now,   Status:  Canceled      07/01/19 2314 07/01/19 2315  Diet Regular  Diet Effective Now      07/01/19 2314    07/01/19 2314  Code Status and Medical Interventions:  Continuous      07/01/19 2314 07/01/19 2314  Intake & Output  Every Shift      07/01/19 2314    07/01/19 2314  Weigh Patient  Once      07/01/19 2314 07/01/19 2314  Oxygen Therapy- Nasal Cannula; Titrate for SPO2: 90% - 95%  Continuous,   Status:  Canceled      07/01/19 2314    07/01/19 2314  Insert Peripheral IV  Once      07/01/19 2314    07/01/19 2314  Saline Lock & Maintain IV Access  Continuous,   Status:  Canceled      07/01/19 2314 07/01/19 2314  Place Sequential Compression Device  Once      07/01/19 2314    07/01/19 2314  Maintain Sequential Compression Device  Continuous      07/01/19 2314 07/01/19 2314  Saline Lock  Continuous      07/01/19 2314 07/01/19 2314  Continuous Cardiac Monitoring  Continuous      07/01/19 2314 07/01/19 2314  Continuous Pulse Oximetry  Continuous     Comments:  For Unresponsiveness, Acute Dyspnea, Cyanosis or Suspected Hypoxemia.  Notify Physician    07/01/19 2314 07/01/19 2314  Oxygen Therapy- Nasal Cannula; Titrate for  SPO2: 90% - 95%  Continuous     Comments:  For Unresponsiveness, Acute Dyspnea, Cyanosis or Suspected Hypoxemia.  Notify Physician    07/01/19 2314 07/01/19 2314  May Be Off Telemetry for Tests  Continuous      07/01/19 2314 07/01/19 2314  ACLS Protocol For Life Threatening Dysrhythmias (Unless Code Status Indicates Otherwise)  Continuous      07/01/19 2314    07/01/19 2314  Notify Provider if ACLS Protocol Activated  Until Discontinued      07/01/19 2314 07/01/19 2313  nitroglycerin (NITROSTAT) SL tablet 0.4 mg  Every 5 Minutes PRN      07/01/19 2314 07/01/19 2313  sodium chloride 0.9 % flush 3-10 mL  As Needed      07/01/19 2314 07/01/19 2313  CMV IgG IgM  Once      07/01/19 2313    07/01/19 2313  CMV DNA, Quantitative, PCR  Once      07/01/19 2313 07/01/19 2313  EBV, Chrnic / Active Infection  Once      07/01/19 2313    07/01/19 2311  ondansetron (ZOFRAN) injection 4 mg  Every 6 Hours PRN,   Status:  Discontinued      07/01/19 2313    07/01/19 2300  sodium chloride 0.9 % infusion  Continuous      07/01/19 2203 07/01/19 2202  acetaminophen (TYLENOL) tablet 650 mg  Every 4 Hours PRN      07/01/19 2202    07/01/19 2202  ondansetron (ZOFRAN) injection 4 mg  Every 6 Hours PRN      07/01/19 2202    07/01/19 1907  Urinalysis, Microscopic Only - Urine, Clean Catch  Once      07/01/19 1906    07/01/19 1859  Inpatient Admission  Once      07/01/19 1859    07/01/19 1857  ibuprofen (ADVIL,MOTRIN) tablet 400 mg  Once      07/01/19 1855    07/01/19 1852  HIV 1 / 2 (HIV-1 / 2) Antibody Differentiation  Once      07/01/19 1840    07/01/19 1818  doxycycline (VIBRAMYCIN) 100 mg/100 mL 0.9% NS MBP  Once      07/01/19 1816    07/01/19 1817  HIV 1 / 2 (HIV-1 / 2) Antibody Differentiation  Once,   Status:  Canceled      07/01/19 1816 07/01/19 1816  [facility] (on-call MD unless specified) Details  Once     Specialty:  Hospitalist  Provider:  (Not yet assigned)    07/01/19 1816 07/01/19 1816  Respiratory Panel, PCR  - Swab, Nasopharynx  Once      19 1816    19 1816  Hepatitis Panel, Acute  Once      19 1816    19 1748  Family Medicine Consult  Once     Specialty:  Family Medicine  Provider:  (Not yet assigned)    19 1748    19 1744  ondansetron (ZOFRAN) injection 4 mg  Once      19 1742    19 1741  CT Head Without Contrast  1 Time Imaging      19 1740    19 1708  Manual Differential  Once      19 1707    19 1648  XR Chest 2 View  1 Time Imaging      19 1647    19 1619  sodium chloride 0.9 % bolus 1,000 mL  Once      19 1617    Unscheduled  ECG 12 Lead  As Needed     Comments:  Nurse to Release if Patient Expericences Acute Chest Pain or Dysrhythmias    19 2314    Unscheduled  Potassium  As Needed     Comments:  For Ventricular Arrhythmias      19 2314    Unscheduled  Magnesium  As Needed     Comments:  For Ventricular Arrhythmias      19 2314    Unscheduled  Troponin  As Needed     Comments:  For Chest Pain      19 2314    Unscheduled  Digoxin Level  As Needed     Comments:  For Atrial Arrhythmias      19 2314    Unscheduled  Blood Gas, Arterial  As Needed     Comments:  Per O2 PolicyNotify Physician      19 2314    Unscheduled  Pain Assessment  As Needed      19 1527    Unscheduled  Oxygen Therapy-  Continuous PRN      19 1527    --  atorvastatin (LIPITOR) 20 MG tablet  Daily      19 1918             Physician Progress Notes       Jomar Vieira MD at 2019 12:25 PM           LOS: 1 day     Name: Anoop Montenegro  Age: 68 y.o.  Sex: male  :  1950  MRN: [id number]         Primary Care Physician: Jimbo Hurley MD    Subjective   Subjective  Still having fevers.  Feeling tremulous.  Intermittent episodes of confusion.  Now has a headache with some neck pain and stiffness.    Objective   Vital Signs  Temp:  [97.5 °F (36.4 °C)-100.1 °F (37.8 °C)] 100.1 °F (37.8  "°C)  Heart Rate:  [51-78] 78  Resp:  [15-20] 16  BP: (108-133)/(60-92) 108/92  Body mass index is 26.96 kg/m².    Objective:  General Appearance:  Comfortable, in no acute distress and ill-appearing.    Vital signs: (most recent): Blood pressure 108/92, pulse 78, temperature 100.1 °F (37.8 °C), temperature source Oral, resp. rate 16, height 180.3 cm (71\"), weight 87.7 kg (193 lb 4.8 oz), SpO2 98 %.    Lungs:  Normal effort and normal respiratory rate.    Heart: Normal rate.  Regular rhythm.    Abdomen: Abdomen is soft.  Bowel sounds are normal.   There is no abdominal tenderness.     Extremities: There is no dependent edema or local swelling.    Neurological: Patient is alert and oriented to person, place and time.  (Appears to have an intention tremor when he goes and reaches for things such as opinion work-up.  No resting tremor).    Skin:  Warm and dry.              Results Review:       I reviewed the patient's new clinical results.    Results from last 7 days   Lab Units 07/02/19  0601 07/01/19  1634 06/30/19  0834 06/28/19  1436 06/27/19  1023   WBC 10*3/mm3 1.13* 1.27* 1.49* 1.80* 2.79*   HEMOGLOBIN g/dL 13.4 15.0 15.1 15.2 15.2   PLATELETS 10*3/mm3 71* 75* 150 172 215     Results from last 7 days   Lab Units 07/02/19  0601 07/01/19  1634 06/30/19  0834 06/28/19  1434   SODIUM mmol/L 132* 131* 134* 130*   POTASSIUM mmol/L 4.5 4.5 4.9 4.5   CHLORIDE mmol/L 100 95* 101 93*   TOTAL CO2 mmol/L  --   --   --  24.5   CO2 mmol/L 24.2 25.3 21.1*  --    BUN mg/dL 23 26* 23 16   CREATININE mg/dL 1.11 1.31* 1.25 1.26   CALCIUM mg/dL 7.7* 8.4* 8.2* 8.7   GLUCOSE mg/dL 99 123* 102*  --            Scheduled Meds:     doxycycline 100 mg Intravenous Q12H   levothyroxine 100 mcg Oral Q AM   sodium chloride 3 mL Intravenous Q12H     PRN Meds:   •  acetaminophen  •  hold  •  nitroglycerin  •  ondansetron  •  sodium chloride  Continuous Infusions:    hold 1 each    sodium chloride 125 mL/hr Last Rate: 125 mL/hr (07/01/19 1606) "       Assessment/Plan   Active Hospital Problems    Diagnosis  POA   • **Fever and chills [R50.9]  Yes   • Leukopenia [D72.819]  Unknown   • Thrombocytopenia (CMS/HCC) [D69.6]  Unknown   • Raynaud's phenomenon without gangrene [I73.00]  Unknown   • Benign essential HTN [I10]  Yes   • Cervical spinal stenosis [M48.02]  Yes   • DDD (degenerative disc disease), cervical [M50.30]  Yes      Resolved Hospital Problems   No resolved problems to display.       Assessment & Plan    -Etiology of his acute illness is not entirely clear.  -Appreciate assistance from infectious disease.  Agree with Dr. Felder that one would think the doxycycline would have shown some benefit by now if this was early ketosis.  Also agree with concern for possible leukemia  -Hematology/oncology will be consulted.  He does have worsening leukopenia and thrombocytopenia what appears to be a petechial rash over his thighs and stomach  -LP has been ordered for possible viral meningitis  -EBV and CMV studies are pending.  HIV pending  -We will complete metabolic work-up and ensure he does not have any electrolyte abnormalities      Jomar Vieira MD  [facility]  07/02/19  12:27 PM    Electronically signed by Jomar Vieira MD at 7/2/2019 12:31 PM          Consult Notes (last 24 hours) (Notes from 7/1/2019  4:31 PM through 7/2/2019  4:31 PM)      Juan Felder MD at 7/2/2019 11:49 AM      Consult Orders    1. Inpatient Infectious Diseases Consult [[id number]] ordered by Rosa Rosa MD at 07/01/19 2003              Referring Provider: Rosa Rosa MD    Subjective   History of present illness:      This is a very nice 68-year-old gentleman we are asked for evaluation opinion regarding fevers and chills.    Per review the records and conversing with the patient and his wife: The patient was in his usual state of health until about 2 weeks ago he developed a tick bite on his anterior chest.   This was only attached for about 2 hours and was removed.  [place] and though frequently gets tick bites.  He started developing some left chest pain and back pain associated with some low-grade fevers and went and saw his primary care doctor on 6/27/2019 was started on doxycycline.  Unfortunately just worsened.  Has stabbing chest pain which prompted evaluation in the ER on 6/30/2019.  CT of the chest abdomen pelvis was negative.  Labs revealed leukopenia and thrombocytopenia as well as elevated liver function test.  He was discharged, but developed acute confusion.  He was therefore brought back to the emergency department yesterday on 7/1/2019.  His wife reports that he is been very difficult to arouse at times and has been tremulous.  They have noted some erythematous rash especially in the chest and thighs.    Last CBC prior to becoming ill was on November 6, 2018 which showed white count of 5.1 with 43% neutrophils, 47% lymphocytes, 6% monocytes and 4% eosinophils.  Hemoglobin was 15.3 and platelets were 308.  On 6/27/2019 his white count dropped to 2.79 with 53% neutrophils, 32% lymphocytes and 14% monocytes.  Platelets were 215.  Most recently, his white count was 1.13 with 55% neutrophils, 32% lymphocytes, 11% monocytes and a hemoglobin of 13.4.  Platelets were 71.    Past medical history: Skin cancer, hypothyroidism, hyperlipidemia, hypertension, cervical spondylolysis, cyst removal, fracture repair of the thumb  No known drug allergies  No family history of infectious diseases  Social history: Lives here in [place], [place] with his wife.  Non-smoker nondrinker.  Retired from [employer]    Review of Systems  Pertinent items are noted in HPI, all other systems reviewed and negative    Objective     Physical Exam:   Vital Signs   Temp:  [97.5 °F (36.4 °C)-100.1 °F (37.8 °C)] 100.1 °F (37.8 °C)  Heart Rate:  [51-64] 64  Resp:  [15-20] 16  BP: (111-133)/(60-91)  123/91    GENERAL: Awake and alert, in no acute distress.   HEENT: Oropharynx is clear. Hearing is grossly normal.   EYES: PERRL. No conjunctival injection. No lid lag.   LYMPHATICS: No lymphadenopathy of the neck or inguinal regions.   HEART: Regular rate and rhythm. No peripheral edema.   LUNGS: Clear to auscultation anteriorly with normal respiratory effort.   GI: Soft, nontender, nondistended. No appreciable organomegaly.   SKIN: Warm and dry without cutaneous eruptions   PSYCHIATRIC: Appropriate mood, affect, insight, and judgment.     Results Review:  Creatinine 1.1  Alkaline phosphatase 62      Total bilirubin 0.2  White count 1.13, 55% neutrophils  Hemoglobin 13.4.  Hematocrit 41.  Platelets 71     Microbiology:  RPP neg  BCx NGTD    Radiology:  CT head negative  CT angiogram of the chest with contrast shows no adenopathy.  No PE no pneumonia.    Assessment/Plan   1.  Leukopenia/neutropenia  2.  Thrombocytopenia   3.  Fever in adult  4.  Intermittent confusion    He should have responded to doxycyline by now if this were ehrlichia, plus his tick really was only attached 2hours--not long enough to transmit infection  Quite concerning for acute leukemia or underlying bone marrow dysfunction.  No lymphadenopathy on exam or recent CT of the chest abdomen pelvis lymphoma unlikely.  Will ask heme to see  Viral meningitis possible and will check LP. Doubt bacterial process.      Thank you for this consult.  We will continue to follow along and tailor antibiotics as the patient's clinical course evolves.    Juan Felder MD  07/02/19  11:49 AM          Electronically signed by Juan Felder MD at 7/2/2019 12:13 PM

## 2019-07-02 NOTE — CONSULTS
Referring Provider: Rosa Rosa MD    Subjective   History of present illness:      This is a very nice 68-year-old gentleman we are asked for evaluation opinion regarding fevers and chills.    Per review the records and conversing with the patient and his wife: The patient was in his usual state of health until about 2 weeks ago he developed a tick bite on his anterior chest.  This was only attached for about 2 hours and was removed.  [place] and though frequently gets tick bites.  He started developing some left chest pain and back pain associated with some low-grade fevers and went and saw his primary care doctor on 6/27/2019 was started on doxycycline.  Unfortunately just worsened.  Has stabbing chest pain which prompted evaluation in the ER on 6/30/2019.  CT of the chest abdomen pelvis was negative.  Labs revealed leukopenia and thrombocytopenia as well as elevated liver function test.  He was discharged, but developed acute confusion.  He was therefore brought back to the emergency department yesterday on 7/1/2019.  His wife reports that he is been very difficult to arouse at times and has been tremulous.  They have noted some erythematous rash especially in the chest and thighs.    Last CBC prior to becoming ill was on November 6, 2018 which showed white count of 5.1 with 43% neutrophils, 47% lymphocytes, 6% monocytes and 4% eosinophils.  Hemoglobin was 15.3 and platelets were 308.  On 6/27/2019 his white count dropped to 2.79 with 53% neutrophils, 32% lymphocytes and 14% monocytes.  Platelets were 215.  Most recently, his white count was 1.13 with 55% neutrophils, 32% lymphocytes, 11% monocytes and a hemoglobin of 13.4.  Platelets were 71.    Past medical history: Skin cancer, hypothyroidism, hyperlipidemia, hypertension, cervical spondylolysis, cyst removal, fracture repair of the thumb  No known drug allergies  No family history of infectious diseases  Social history:  Lives here in [place], [place] with his wife.  Non-smoker nondrinker.  Retired from [employer]    Review of Systems  Pertinent items are noted in HPI, all other systems reviewed and negative    Objective     Physical Exam:   Vital Signs   Temp:  [97.5 °F (36.4 °C)-100.1 °F (37.8 °C)] 100.1 °F (37.8 °C)  Heart Rate:  [51-64] 64  Resp:  [15-20] 16  BP: (111-133)/(60-91) 123/91    GENERAL: Awake and alert, in no acute distress.   HEENT: Oropharynx is clear. Hearing is grossly normal.   EYES: PERRL. No conjunctival injection. No lid lag.   LYMPHATICS: No lymphadenopathy of the neck or inguinal regions.   HEART: Regular rate and rhythm. No peripheral edema.   LUNGS: Clear to auscultation anteriorly with normal respiratory effort.   GI: Soft, nontender, nondistended. No appreciable organomegaly.   SKIN: Warm and dry without cutaneous eruptions   PSYCHIATRIC: Appropriate mood, affect, insight, and judgment.     Results Review:  Creatinine 1.1  Alkaline phosphatase 62      Total bilirubin 0.2  White count 1.13, 55% neutrophils  Hemoglobin 13.4.  Hematocrit 41.  Platelets 71     Microbiology:  RPP neg  BCx NGTD    Radiology:  CT head negative  CT angiogram of the chest with contrast shows no adenopathy.  No PE no pneumonia.    Assessment/Plan   1.  Leukopenia/neutropenia  2.  Thrombocytopenia   3.  Fever in adult  4.  Intermittent confusion    He should have responded to doxycyline by now if this were ehrlichia, plus his tick really was only attached 2hours--not long enough to transmit infection  Quite concerning for acute leukemia or underlying bone marrow dysfunction.  No lymphadenopathy on exam or recent CT of the chest abdomen pelvis lymphoma unlikely.  Will ask heme to see  Viral meningitis possible and will check LP. Doubt bacterial process.      Thank you for this consult.  We will continue to follow along and tailor antibiotics as the patient's clinical course evolves.    Juan Felder,  MD  07/02/19  11:49 AM

## 2019-07-02 NOTE — PROGRESS NOTES
" LOS: 1 day     Name: Anoop Montenegro  Age: 68 y.o.  Sex: male  :  1950  MRN: [id number]         Primary Care Physician: Jimbo Hurley MD    Subjective   Subjective  Still having fevers.  Feeling tremulous.  Intermittent episodes of confusion.  Now has a headache with some neck pain and stiffness.    Objective   Vital Signs  Temp:  [97.5 °F (36.4 °C)-100.1 °F (37.8 °C)] 100.1 °F (37.8 °C)  Heart Rate:  [51-78] 78  Resp:  [15-20] 16  BP: (108-133)/(60-92) 108/92  Body mass index is 26.96 kg/m².    Objective:  General Appearance:  Comfortable, in no acute distress and ill-appearing.    Vital signs: (most recent): Blood pressure 108/92, pulse 78, temperature 100.1 °F (37.8 °C), temperature source Oral, resp. rate 16, height 180.3 cm (71\"), weight 87.7 kg (193 lb 4.8 oz), SpO2 98 %.    Lungs:  Normal effort and normal respiratory rate.    Heart: Normal rate.  Regular rhythm.    Abdomen: Abdomen is soft.  Bowel sounds are normal.   There is no abdominal tenderness.     Extremities: There is no dependent edema or local swelling.    Neurological: Patient is alert and oriented to person, place and time.  (Appears to have an intention tremor when he goes and reaches for things such as opinion work-up.  No resting tremor).    Skin:  Warm and dry.              Results Review:       I reviewed the patient's new clinical results.    Results from last 7 days   Lab Units 19  0601 19  1634 19  0834 19  1436 19  1023   WBC 10*3/mm3 1.13* 1.27* 1.49* 1.80* 2.79*   HEMOGLOBIN g/dL 13.4 15.0 15.1 15.2 15.2   PLATELETS 10*3/mm3 71* 75* 150 172 215     Results from last 7 days   Lab Units 19  0601 19  1634 19  0834 19  1434   SODIUM mmol/L 132* 131* 134* 130*   POTASSIUM mmol/L 4.5 4.5 4.9 4.5   CHLORIDE mmol/L 100 95* 101 93*   TOTAL CO2 mmol/L  --   --   --  24.5   CO2 mmol/L 24.2 25.3 21.1*  --    BUN mg/dL 23 26* 23 16   CREATININE mg/dL 1.11 1.31* 1.25 1.26   CALCIUM " mg/dL 7.7* 8.4* 8.2* 8.7   GLUCOSE mg/dL 99 123* 102*  --            Scheduled Meds:     doxycycline 100 mg Intravenous Q12H   levothyroxine 100 mcg Oral Q AM   sodium chloride 3 mL Intravenous Q12H     PRN Meds:   •  acetaminophen  •  hold  •  nitroglycerin  •  ondansetron  •  sodium chloride  Continuous Infusions:    hold 1 each    sodium chloride 125 mL/hr Last Rate: 125 mL/hr (07/01/19 8387)       Assessment/Plan   Active Hospital Problems    Diagnosis  POA   • **Fever and chills [R50.9]  Yes   • Leukopenia [D72.819]  Unknown   • Thrombocytopenia (CMS/HCC) [D69.6]  Unknown   • Raynaud's phenomenon without gangrene [I73.00]  Unknown   • Benign essential HTN [I10]  Yes   • Cervical spinal stenosis [M48.02]  Yes   • DDD (degenerative disc disease), cervical [M50.30]  Yes      Resolved Hospital Problems   No resolved problems to display.       Assessment & Plan    -Etiology of his acute illness is not entirely clear.  -Appreciate assistance from infectious disease.  Agree with Dr. Felder that one would think the doxycycline would have shown some benefit by now if this was early ketosis.  Also agree with concern for possible leukemia  -Hematology/oncology will be consulted.  He does have worsening leukopenia and thrombocytopenia what appears to be a petechial rash over his thighs and stomach  -LP has been ordered for possible viral meningitis  -EBV and CMV studies are pending.  HIV pending  -We will complete metabolic work-up and ensure he does not have any electrolyte abnormalities      Jomar Vieira MD  [facility]  07/02/19  12:27 PM

## 2019-07-02 NOTE — H&P (VIEW-ONLY)
" LOS: 1 day     Name: Anoop Montenegro  Age: 68 y.o.  Sex: male  :  1950  MRN: [id number]         Primary Care Physician: Jimbo Hurley MD    Subjective   Subjective  Still having fevers.  Feeling tremulous.  Intermittent episodes of confusion.  Now has a headache with some neck pain and stiffness.    Objective   Vital Signs  Temp:  [97.5 °F (36.4 °C)-100.1 °F (37.8 °C)] 100.1 °F (37.8 °C)  Heart Rate:  [51-78] 78  Resp:  [15-20] 16  BP: (108-133)/(60-92) 108/92  Body mass index is 26.96 kg/m².    Objective:  General Appearance:  Comfortable, in no acute distress and ill-appearing.    Vital signs: (most recent): Blood pressure 108/92, pulse 78, temperature 100.1 °F (37.8 °C), temperature source Oral, resp. rate 16, height 180.3 cm (71\"), weight 87.7 kg (193 lb 4.8 oz), SpO2 98 %.    Lungs:  Normal effort and normal respiratory rate.    Heart: Normal rate.  Regular rhythm.    Abdomen: Abdomen is soft.  Bowel sounds are normal.   There is no abdominal tenderness.     Extremities: There is no dependent edema or local swelling.    Neurological: Patient is alert and oriented to person, place and time.  (Appears to have an intention tremor when he goes and reaches for things such as opinion work-up.  No resting tremor).    Skin:  Warm and dry.              Results Review:       I reviewed the patient's new clinical results.    Results from last 7 days   Lab Units 19  0601 19  1634 19  0834 19  1436 19  1023   WBC 10*3/mm3 1.13* 1.27* 1.49* 1.80* 2.79*   HEMOGLOBIN g/dL 13.4 15.0 15.1 15.2 15.2   PLATELETS 10*3/mm3 71* 75* 150 172 215     Results from last 7 days   Lab Units 19  0601 19  1634 19  0834 19  1434   SODIUM mmol/L 132* 131* 134* 130*   POTASSIUM mmol/L 4.5 4.5 4.9 4.5   CHLORIDE mmol/L 100 95* 101 93*   TOTAL CO2 mmol/L  --   --   --  24.5   CO2 mmol/L 24.2 25.3 21.1*  --    BUN mg/dL 23 26* 23 16   CREATININE mg/dL 1.11 1.31* 1.25 1.26   CALCIUM " mg/dL 7.7* 8.4* 8.2* 8.7   GLUCOSE mg/dL 99 123* 102*  --            Scheduled Meds:     doxycycline 100 mg Intravenous Q12H   levothyroxine 100 mcg Oral Q AM   sodium chloride 3 mL Intravenous Q12H     PRN Meds:   •  acetaminophen  •  hold  •  nitroglycerin  •  ondansetron  •  sodium chloride  Continuous Infusions:    hold 1 each    sodium chloride 125 mL/hr Last Rate: 125 mL/hr (07/01/19 3247)       Assessment/Plan   Active Hospital Problems    Diagnosis  POA   • **Fever and chills [R50.9]  Yes   • Leukopenia [D72.819]  Unknown   • Thrombocytopenia (CMS/HCC) [D69.6]  Unknown   • Raynaud's phenomenon without gangrene [I73.00]  Unknown   • Benign essential HTN [I10]  Yes   • Cervical spinal stenosis [M48.02]  Yes   • DDD (degenerative disc disease), cervical [M50.30]  Yes      Resolved Hospital Problems   No resolved problems to display.       Assessment & Plan    -Etiology of his acute illness is not entirely clear.  -Appreciate assistance from infectious disease.  Agree with Dr. Felder that one would think the doxycycline would have shown some benefit by now if this was early ketosis.  Also agree with concern for possible leukemia  -Hematology/oncology will be consulted.  He does have worsening leukopenia and thrombocytopenia what appears to be a petechial rash over his thighs and stomach  -LP has been ordered for possible viral meningitis  -EBV and CMV studies are pending.  HIV pending  -We will complete metabolic work-up and ensure he does not have any electrolyte abnormalities      Jomar Vieira MD  [facility]  07/02/19  12:27 PM

## 2019-07-02 NOTE — H&P
Internal medicine history and physical  INTERNAL MEDICINE   [facility]       Patient Identification:  Name: Anoop Montenegro  Age: 68 y.o.  Sex: male  :  1950  MRN: [id number]                   Primary Care Physician: Jimbo Hurley MD                                   Chief Complaint: Progressive weakness fever and episodes of confusion started gradually on 2019.    History of Present Illness:   Source of information patient patient's wife and patient's family member who works in cardiology unit as a nurse in this hospital.  Patient is a 68-year-old male with past medical history as noted below including history of Raynaud's for long period of time lives a very outdoor lifestyle.  [place] and his father is a week over there.  Patient and his wife admit that he may have had exposure to ticks in the past and have had removed ticks on him before but this specifically remember a tick attached to him on 2019 and it was pulled.  He is not sure how long the tick was attached to him.  Couple of days later according to the wife he started having symptoms of fatigue and weakness but and had worsening of his low back pain which improved a couple of days later and on 2019 he had a fever  Of 100.3 and chills and felt bad.  He saw his primary care physician/nurse practice on 2019 and was started on doxycycline.  He followed up again the next day with his primary care physician and was noted to have WBC count of 2000.  Further work-up was added including CARMEL with double-stranded DNA and other connective tissue disease profile.  Patient symptom continued to deteriorate and he was having more frequent episodes of confusion and recurrent fever.  He presented to the emergency room on 2019 first sharp stabbing left-sided chest pain that started suddenly this is associated with temperature rising up to 101.8 him having intermittent headaches chills and nausea.  Patient  did travel to [place] and came back prior to finding tick on him on 6/17/2019.  Repeat work-up in the emergency room corroborated with the previous work-up done at his primary care physician's office.  Diagnosis of tickborne illness was made serology for ehrlichiosis was added to his work-up.  Patient was discharged to follow-up with his primary care physician instructions to continue doxycycline.  Since he left the hospital yesterday he is becoming more confused at times and sleepy and sluggish.  Because of his worsening change in status despite being on doxycycline patient was brought into the emergency room.  His intermittent chest pain has resolved.  He was also exhibiting some skin sensitivity which is also improved.  As mentioned patient has rainouts phenomena but underlying autoimmune disorder was never identified.  Emergency room physician contacted infectious disease service on call and case was discussed with Dr. Sinha and and further lab work was ordered.  Past Medical History:  Past Medical History:   Diagnosis Date   • Allergic rhinitis    • Arthritis    • Cancer (CMS/HCC)     skin   • Cervical spondylosis with myelopathy    • Elevated cholesterol    • FHx: colonic polyps    • GERD (gastroesophageal reflux disease)    • History of colonic polyps    • Hypertension     Benign Essential   • Hypothyroidism    • Insomnia    • Malaise and fatigue    • Pure hypercholesterolemia      Past Surgical History:  Past Surgical History:   Procedure Laterality Date   • COLONOSCOPY N/A 11/04/2011   • COLONOSCOPY N/A 06/2005   • COLONOSCOPY N/A 11/15/2013   • CYST REMOVAL  2014   • FRACTURE SURGERY      right thumb   • HAND SURGERY Left     3rd finger   • SKIN BIOPSY      scalp      Home Meds:  Medications Prior to Admission   Medication Sig Dispense Refill Last Dose   • atorvastatin (LIPITOR) 20 MG tablet Take 20 mg by mouth Daily.   6/30/2019 at Unknown time   • doxycycline (ADOXA) 100 MG tablet Take 1 tablet by mouth 2  (Two) Times a Day for 14 days. 28 tablet 0 7/1/2019 at am   • gabapentin (NEURONTIN) 300 MG capsule Take 1 capsule by mouth Every Night. 15 capsule 0 6/30/2019 at Unknown time   • HYDROcodone-acetaminophen (NORCO) 5-325 MG per tablet Take 1 tablet by mouth Every 6 (Six) Hours As Needed for Moderate Pain . 8 tablet 0 6/30/2019 at Unknown time   • irbesartan (AVAPRO) 300 MG tablet Take 1 tablet by mouth Daily. 90 tablet 1 6/30/2019 at Unknown time   • levothyroxine (SYNTHROID, LEVOTHROID) 100 MCG tablet Take 1 tablet by mouth Daily. 90 tablet 1 6/30/2019 at Unknown time   • ondansetron (ZOFRAN) 4 MG tablet Take 1 tablet by mouth Every 8 (Eight) Hours As Needed for Nausea or Vomiting for up to 3 days. 30 tablet 0 7/1/2019 at am   • sildenafil (VIAGRA) 100 MG tablet Take 1 tablet by mouth Daily As Needed for erectile dysfunction. 10 tablet 3 Taking     Current Meds:     Current Facility-Administered Medications:   •  acetaminophen (TYLENOL) tablet 650 mg, 650 mg, Oral, Q4H PRN, Rosa Rosa MD, 650 mg at 07/01/19 2220  •  [START ON 7/2/2019] doxycycline (VIBRAMYCIN) 100 mg/100 mL 0.9% NS MBP, 100 mg, Intravenous, Q12H, Rosa Rosa MD  •  [START ON 7/2/2019] levothyroxine (SYNTHROID, LEVOTHROID) tablet 100 mcg, 100 mcg, Oral, Daily, Rosa Rosa MD  •  nitroglycerin (NITROSTAT) SL tablet 0.4 mg, 0.4 mg, Sublingual, Q5 Min PRN, Rosa Rosa MD  •  ondansetron (ZOFRAN) injection 4 mg, 4 mg, Intravenous, Q6H PRN, Rosa Rosa MD, 4 mg at 07/01/19 2220  •  ondansetron (ZOFRAN) injection 4 mg, 4 mg, Intravenous, Q6H PRN, Rosa Rosa MD  •  [START ON 7/2/2019] sodium chloride 0.9 % flush 3 mL, 3 mL, Intravenous, Q12H, Rosa Rosa MD  •  sodium chloride 0.9 % flush 3-10 mL, 3-10 mL, Intravenous, PRN, Rosa Rosa MD  •  sodium chloride 0.9 % infusion, 125 mL/hr, Intravenous, Continuous, Rosa Rosa MD  Allergies:  No Known Allergies  Social History:   Social History     Tobacco Use   • Smoking status: Never Smoker  "  • Smokeless tobacco: Never Used   Substance Use Topics   • Alcohol use: Yes     Alcohol/week: 0.6 oz     Types: 1 Shots of liquor per week     Comment: 3-5 times a week      Family History:  Family History   Problem Relation Age of Onset   • Hypertension Mother    • Heart failure Mother    • Hearing loss Mother    • Hyperlipidemia Mother    • Aortic aneurysm Father    • Arthritis Father    • Prostate cancer Brother    • Heart disease Brother    • Hypertension Brother    • Liver cancer Sister    • Breast cancer Sister    • Cancer Sister    • Hyperlipidemia Sister    • Cancer Maternal Uncle    • Heart disease Maternal Uncle           Review of Systems  See history of present illness and past medical history.  Constitutional: Positive for fever (101.2). Negative for activity change and appetite change.   HENT: Negative for congestion and sore throat.    Eyes: Negative.    Respiratory: Negative for cough and shortness of breath.    Cardiovascular: Negative for chest pain and leg swelling.   Gastrointestinal: Positive for nausea. Negative for abdominal pain, diarrhea and vomiting.   Endocrine: Negative.    Genitourinary: Negative for decreased urine volume and dysuria.   Musculoskeletal: Positive for neck stiffness. Negative for neck pain.   Skin: Negative for rash and wound.   Allergic/Immunologic: Negative.    Neurological: Positive for headaches. Negative for weakness and numbness.   Hematological: Negative.    Psychiatric/Behavioral: Negative.    All other systems reviewed and are negative.          Vitals:   /84 (BP Location: Right arm, Patient Position: Lying)   Pulse 64   Temp 98.1 °F (36.7 °C) (Oral)   Resp 16   Ht 180.3 cm (71\")   Wt 87.7 kg (193 lb 4.8 oz)   SpO2 98%   BMI 26.96 kg/m²   I/O:     Intake/Output Summary (Last 24 hours) at 7/1/2019 8320  Last data filed at 7/1/2019 1700  Gross per 24 hour   Intake 1000 ml   Output --   Net 1000 ml     Exam:  General Appearance:   Awake alert tired " and ill-appearing male who does not appear to be toxic or septic.  Despite the fact that he can have a proper intelligent conversation and exhibit good memory he is distraught by the fact that he has episodes in which he cannot understand to put things together.   Head:    Normocephalic, without obvious abnormality, atraumatic   Eyes:    PERRL, conjunctiva/corneas clear, EOM's intact, both eyes   Ears:    Normal external ear canals, both ears   Nose:   Nares normal, septum midline, mucosa normal, no drainage    or sinus tenderness   Throat:   Lips, tongue, gums normal; oral mucosa pink and moist   Neck:   Supple, symmetrical, trachea midline, no adenopathy;     thyroid:  no enlargement/tenderness/nodules; no carotid    bruit or JVD   Back:     Symmetric, no curvature, ROM normal, no CVA tenderness   Lungs:     Clear to auscultation bilaterally, respirations unlabored   Chest Wall:    No tenderness or deformity    Heart:    Regular rate and rhythm, S1 and S2 normal, no murmur, rub   or gallop   Abdomen:     Soft, non-tender, bowel sounds active all four quadrants,     no masses, no hepatomegaly, no splenomegaly   Extremities:   Extremities normal, atraumatic, no cyanosis or edema has skin changes involving his hand and feet suggestive of evolving scleroderma.   Pulses:   Pulses palpable in all extremities; symmetric all extremities   Skin:   Skin color normal, Skin is warm and dry,  no rashes or palpable lesions   Neurologic:   CNII-XII intact, motor strength grossly intact, sensation grossly intact to light touch, no focal deficits noted       Data Review:      I reviewed the patient's new clinical results.  Results from last 7 days   Lab Units 07/01/19  1634 06/30/19  0834 06/28/19  1436 06/27/19  1023   WBC 10*3/mm3 1.27* 1.49* 1.80* 2.79*   HEMOGLOBIN g/dL 15.0 15.1 15.2 15.2   PLATELETS 10*3/mm3 75* 150 172 215     Results from last 7 days   Lab Units 07/01/19  1634 06/30/19  0834 06/28/19  1434   SODIUM mmol/L  131* 134* 130*   POTASSIUM mmol/L 4.5 4.9 4.5   CHLORIDE mmol/L 95* 101 93*   TOTAL CO2 mmol/L  --   --  24.5   CO2 mmol/L 25.3 21.1*  --    BUN mg/dL 26* 23 16   CREATININE mg/dL 1.31* 1.25 1.26   CALCIUM mg/dL 8.4* 8.2* 8.7   GLUCOSE mg/dL 123* 102*  --      Xr Chest 2 View    Result Date: 7/1/2019   No active disease in the chest.  This report was finalized on 7/1/2019 5:54 PM by Dr. Ryan Yoo M.D.      Ct Head Without Contrast    Result Date: 7/1/2019   No evidence for acute intracranial pathology.  Radiation dose reduction techniques were utilized, including automated exposure control and exposure modulation based on body size.  This report was finalized on 7/1/2019 8:22 PM by Dr. Ryan Yoo M.D.        Assessment:  Active Hospital Problems    Diagnosis POA   • **Fever and chills [R50.9] Yes   • Leukopenia [D72.819] Unknown   • Thrombocytopenia (CMS/HCC) [D69.6] Unknown   • Raynaud's phenomenon without gangrene [I73.00] Unknown   • Benign essential HTN [I10] Yes   • Cervical spinal stenosis [M48.02] Yes   • DDD (degenerative disc disease), cervical [M50.30] Yes     Impression: 01/30/2015 - x;          Medical decision making:  Progressive febrile illness with multitude of symptoms ranging from body aches to episodic chest pain 2 headaches and fever with decreased appetite and progressive decline in white blood cell count and platelet count with mildly abnormal LFTs in the context of significant exposure few weeks ago.  This presentation is consistent with probable tickborne illness and likely ehrlichiosis.  Other possibilities to consider would be reactivation CMV or EBV viral infection manifesting as leukopenia and transaminitis with systemic fever and chills versus evolving hematological disorder such as a leukemic leukemia.  Overt manifestation of underlying undiagnosed involving autoimmune disorders such as lupus given the fact that he has history of Isacc's need to be considered.  Plan is to admit  the patient follow-up on various work-up sent by the primary care physician's office few days ago as well as Ehrlichia serology.  Continue with IV doxycycline and provide him with supportive care and symptom management.  Dr. Paige Sinha has been consulted by emergency room physician and will request her for continued assistance in this situation.  Hypertension-continue antihypertensive regimen  Dehydration with rising creatinine and hyponatremia-provided with IV fluids and avoid nephrotoxic agents.  Patient did have CT angiogram done on 6/30/2019 and this change in creatinine could very well be due to contrast-induced effect and need to be monitored.    Rosa Rosa MD   7/1/2019  11:16 PM  Much of this encounter note is an electronic transcription/translation of spoken language to printed text. The electronic translation of spoken language may permit erroneous, or at times, nonsensical words or phrases to be inadvertently transcribed; Although I have reviewed the note for such errors, some may still exist

## 2019-07-02 NOTE — PROGRESS NOTES
Discharge Planning Assessment  Psychiatric     Patient Name: Anoop Montenegro  MRN: [id number]  Today's Date: 7/2/2019    Admit Date: 7/1/2019    Discharge Needs Assessment     Row Name 07/02/19 1107       Living Environment    Lives With  spouse    Current Living Arrangements  home/apartment/condo    Potentially Unsafe Housing Conditions  other (see comments) no concerns     Primary Care Provided by  self    Provides Primary Care For  no one    Family Caregiver if Needed  spouse    Quality of Family Relationships  helpful;involved;supportive    Able to Return to Prior Arrangements  yes       Resource/Environmental Concerns    Resource/Environmental Concerns  none    Transportation Concerns  car, none       Transition Planning    Patient/Family Anticipates Transition to  home    Patient/Family Anticipated Services at Transition  none    Transportation Anticipated  family or friend will provide       Discharge Needs Assessment    Readmission Within the Last 30 Days  no previous admission in last 30 days    Concerns to be Addressed  no discharge needs identified;denies needs/concerns at this time    Equipment Currently Used at Home  none    Anticipated Changes Related to Illness  none    Equipment Needed After Discharge  none        Discharge Plan     Row Name 07/02/19 1104       Plan    Plan  Home; follow for I&D recommendations for possible IV abx     Patient/Family in Agreement with Plan  yes    Plan Comments  CCP met with patient and patient's spouse, Yecenia [phone] at bedside. CCP role explained and discharge planning discussed. Face sheet verified and IMM noted. Patient's PCP is Dr. Hurley. Patient lives with his spouse, with one step to the entrance. Patient's bedroom and bathroom are on the main level. Patient is independent with his ADLS and does not use DME. Patient has not used home health or been to SNF. Patient's preferred pharmacy is [facility]. I&D consulted. CCP will follow for I&D  recommendations and assist as needed. Hellen Blackwell CSW          Destination      No service coordination in this encounter.      Durable Medical Equipment      No service coordination in this encounter.      Dialysis/Infusion      No service coordination in this encounter.      Home Medical Care      No service coordination in this encounter.      Therapy      No service coordination in this encounter.      Community Resources      No service coordination in this encounter.          Demographic Summary     Row Name 07/02/19 1107       General Information    Admission Type  inpatient    Arrived From  emergency department    Required Notices Provided  Important Message from Medicare    Referral Source  admission list    Reason for Consult  discharge planning    Preferred Language  English     Used During This Interaction  no        Functional Status     Row Name 07/02/19 1107       Functional Status    Usual Activity Tolerance  good    Current Activity Tolerance  good       Functional Status, IADL    Medications  independent    Meal Preparation  independent    Housekeeping  independent    Laundry  independent    Shopping  independent       Mental Status    General Appearance WDL  WDL       Mental Status Summary    Recent Changes in Mental Status/Cognitive Functioning  no changes        Psychosocial    No documentation.       Abuse/Neglect    No documentation.       Legal    No documentation.       Substance Abuse    No documentation.       Patient Forms    No documentation.           Honey Blackwell, CARLY

## 2019-07-02 NOTE — PLAN OF CARE
Problem: Patient Care Overview  Goal: Plan of Care Review  Outcome: Ongoing (interventions implemented as appropriate)   07/02/19 3757   Coping/Psychosocial   Plan of Care Reviewed With patient   Plan of Care Review   Progress no change   OTHER   Outcome Summary pt had fever of 100.1, seen by Dr Felder, LP done- pt to remain flat unti 2330, c/o headache and neck pain, abdominal pain improved, vss post LP, site clean, dry and intact, rash noted on abdomen and thighs, continue to monitor       Problem: Fall Risk (Adult)  Goal: Absence of Fall  Outcome: Ongoing (interventions implemented as appropriate)      Problem: Pain, Acute (Adult)  Goal: Identify Related Risk Factors and Signs and Symptoms  Outcome: Ongoing (interventions implemented as appropriate)    Goal: Acceptable Pain Control/Comfort Level  Outcome: Ongoing (interventions implemented as appropriate)

## 2019-07-02 NOTE — PLAN OF CARE
Problem: Patient Care Overview  Goal: Plan of Care Review  Outcome: Ongoing (interventions implemented as appropriate)   07/02/19 0413   Coping/Psychosocial   Plan of Care Reviewed With patient;spouse;family   Plan of Care Review   Progress no change   OTHER   Outcome Summary VSS, NO ACUTE DISTRESS NOTED, SAFETY MAINTAINED, CONTINUE PLAN OF CARE     Goal: Individualization and Mutuality  Outcome: Ongoing (interventions implemented as appropriate)    Goal: Discharge Needs Assessment  Outcome: Ongoing (interventions implemented as appropriate)      Problem: Fall Risk (Adult)  Goal: Identify Related Risk Factors and Signs and Symptoms  Outcome: Outcome(s) achieved Date Met: 07/02/19    Goal: Absence of Fall  Outcome: Ongoing (interventions implemented as appropriate)

## 2019-07-03 LAB
A PHAGOCYTOPH IGM TITR SER IF: NEGATIVE {TITER}
ALBUMIN SERPL-MCNC: 2.9 G/DL (ref 3.5–5.2)
ALBUMIN/GLOB SERPL: 1.3 G/DL
ALP SERPL-CCNC: 66 U/L (ref 39–117)
ALT SERPL W P-5'-P-CCNC: 140 U/L (ref 1–41)
ANION GAP SERPL CALCULATED.3IONS-SCNC: 7 MMOL/L (ref 5–15)
AST SERPL-CCNC: 205 U/L (ref 1–40)
BILIRUB SERPL-MCNC: 0.2 MG/DL (ref 0.2–1.2)
BUN BLD-MCNC: 16 MG/DL (ref 8–23)
BUN/CREAT SERPL: 17.2 (ref 7–25)
CALCIUM SPEC-SCNC: 7.7 MG/DL (ref 8.6–10.5)
CHLORIDE SERPL-SCNC: 107 MMOL/L (ref 98–107)
CMV IGG SERPL IA-ACNC: <0.6 U/ML (ref 0–0.59)
CMV IGM SERPL IA-ACNC: <30 AU/ML (ref 0–29.9)
CO2 SERPL-SCNC: 24 MMOL/L (ref 22–29)
CONV HGE IGG TITER: NEGATIVE
CREAT BLD-MCNC: 0.93 MG/DL (ref 0.76–1.27)
DEPRECATED RDW RBC AUTO: 45.2 FL (ref 37–54)
E CHAFFEENSIS IGG TITR SER IF: NEGATIVE {TITER}
E. CHAFFEENSIS (HME) IGM TITER: NEGATIVE
EBV EA IGG SER-ACNC: 60 U/ML (ref 0–8.9)
EBV NA IGG SER IA-ACNC: >600 U/ML (ref 0–17.9)
EBV VCA IGG SER-ACNC: >600 U/ML (ref 0–17.9)
ERYTHROCYTE [DISTWIDTH] IN BLOOD BY AUTOMATED COUNT: 13.5 % (ref 12.3–15.4)
FOLATE SERPL-MCNC: 18.8 NG/ML (ref 4.78–24.2)
GFR SERPL CREATININE-BSD FRML MDRD: 81 ML/MIN/1.73
GLOBULIN UR ELPH-MCNC: 2.3 GM/DL
GLUCOSE BLD-MCNC: 89 MG/DL (ref 65–99)
HCT VFR BLD AUTO: 39.8 % (ref 37.5–51)
HGB BLD-MCNC: 13.4 G/DL (ref 13–17.7)
HIV 1 & 2 AB SERPLBLD IA.RAPID: NEGATIVE
HIV 2 AB SERPLBLD QL IA.RAPID: NEGATIVE
HIV1 AB SERPLBLD QL IA.RAPID: NEGATIVE
INR PPP: 0.96 (ref 0.9–1.1)
INTERPRETATION: ABNORMAL
LYMPHOCYTES # BLD MANUAL: 0.59 10*3/MM3 (ref 0.7–3.1)
LYMPHOCYTES NFR BLD MANUAL: 34 % (ref 19.6–45.3)
LYMPHOCYTES NFR BLD MANUAL: 6 % (ref 5–12)
MAGNESIUM SERPL-MCNC: 1.9 MG/DL (ref 1.6–2.4)
MCH RBC QN AUTO: 30.5 PG (ref 26.6–33)
MCHC RBC AUTO-ENTMCNC: 33.7 G/DL (ref 31.5–35.7)
MCV RBC AUTO: 90.7 FL (ref 79–97)
MONOCYTES # BLD AUTO: 0.1 10*3/MM3 (ref 0.1–0.9)
NEUTROPHILS # BLD AUTO: 1.02 10*3/MM3 (ref 1.7–7)
NEUTROPHILS NFR BLD MANUAL: 59 % (ref 42.7–76)
PLAT MORPH BLD: NORMAL
PLATELET # BLD AUTO: 72 10*3/MM3 (ref 140–450)
PMV BLD AUTO: 11 FL (ref 6–12)
POTASSIUM BLD-SCNC: 4.3 MMOL/L (ref 3.5–5.2)
PROT SERPL-MCNC: 5.2 G/DL (ref 6–8.5)
PROTHROMBIN TIME: 12.4 SECONDS (ref 11.7–14.2)
RBC # BLD AUTO: 4.39 10*6/MM3 (ref 4.14–5.8)
RBC MORPH BLD: NORMAL
REF LAB TEST METHOD: NORMAL
SODIUM BLD-SCNC: 138 MMOL/L (ref 136–145)
TSH SERPL DL<=0.05 MIU/L-ACNC: 9.75 MIU/ML (ref 0.27–4.2)
VARIANT LYMPHS NFR BLD MANUAL: 1 % (ref 0–5)
VIT B12 BLD-MCNC: 666 PG/ML (ref 211–946)
WBC MORPH BLD: NORMAL
WBC NRBC COR # BLD: 1.73 10*3/MM3 (ref 3.4–10.8)

## 2019-07-03 PROCEDURE — 99232 SBSQ HOSP IP/OBS MODERATE 35: CPT | Performed by: INTERNAL MEDICINE

## 2019-07-03 PROCEDURE — 85025 COMPLETE CBC W/AUTO DIFF WBC: CPT | Performed by: INTERNAL MEDICINE

## 2019-07-03 PROCEDURE — 82746 ASSAY OF FOLIC ACID SERUM: CPT | Performed by: INTERNAL MEDICINE

## 2019-07-03 PROCEDURE — 25010000002 ONDANSETRON PER 1 MG: Performed by: INTERNAL MEDICINE

## 2019-07-03 PROCEDURE — 82607 VITAMIN B-12: CPT | Performed by: INTERNAL MEDICINE

## 2019-07-03 PROCEDURE — 97162 PT EVAL MOD COMPLEX 30 MIN: CPT

## 2019-07-03 PROCEDURE — 80053 COMPREHEN METABOLIC PANEL: CPT | Performed by: INTERNAL MEDICINE

## 2019-07-03 PROCEDURE — 83735 ASSAY OF MAGNESIUM: CPT | Performed by: INTERNAL MEDICINE

## 2019-07-03 PROCEDURE — 97110 THERAPEUTIC EXERCISES: CPT

## 2019-07-03 PROCEDURE — 85007 BL SMEAR W/DIFF WBC COUNT: CPT | Performed by: INTERNAL MEDICINE

## 2019-07-03 PROCEDURE — 85610 PROTHROMBIN TIME: CPT | Performed by: INTERNAL MEDICINE

## 2019-07-03 PROCEDURE — 84443 ASSAY THYROID STIM HORMONE: CPT | Performed by: INTERNAL MEDICINE

## 2019-07-03 RX ORDER — ONDANSETRON 4 MG/1
4 TABLET, FILM COATED ORAL EVERY 6 HOURS PRN
Status: DISCONTINUED | OUTPATIENT
Start: 2019-07-03 | End: 2019-07-04 | Stop reason: HOSPADM

## 2019-07-03 RX ORDER — DOXYCYCLINE 100 MG/1
100 CAPSULE ORAL EVERY 12 HOURS SCHEDULED
Status: DISCONTINUED | OUTPATIENT
Start: 2019-07-03 | End: 2019-07-04 | Stop reason: HOSPADM

## 2019-07-03 RX ORDER — LEVOTHYROXINE SODIUM 0.12 MG/1
125 TABLET ORAL
Status: DISCONTINUED | OUTPATIENT
Start: 2019-07-04 | End: 2019-07-04 | Stop reason: HOSPADM

## 2019-07-03 RX ADMIN — DOXYCYCLINE 100 MG: 100 CAPSULE ORAL at 20:51

## 2019-07-03 RX ADMIN — DOXYCYCLINE 100 MG: 100 CAPSULE ORAL at 10:11

## 2019-07-03 RX ADMIN — LEVOTHYROXINE SODIUM 100 MCG: 100 TABLET ORAL at 09:37

## 2019-07-03 RX ADMIN — BUTALBITAL, ACETAMINOPHEN, AND CAFFEINE 1 TABLET: 50; 325; 40 TABLET ORAL at 20:51

## 2019-07-03 RX ADMIN — ACETAMINOPHEN 650 MG: 325 TABLET, FILM COATED ORAL at 10:11

## 2019-07-03 RX ADMIN — ONDANSETRON 4 MG: 2 INJECTION INTRAMUSCULAR; INTRAVENOUS at 10:18

## 2019-07-03 RX ADMIN — SODIUM CHLORIDE, PRESERVATIVE FREE 3 ML: 5 INJECTION INTRAVENOUS at 20:51

## 2019-07-03 RX ADMIN — ZOLPIDEM TARTRATE 5 MG: 5 TABLET ORAL at 20:51

## 2019-07-03 RX ADMIN — SODIUM CHLORIDE, PRESERVATIVE FREE 3 ML: 5 INJECTION INTRAVENOUS at 09:37

## 2019-07-03 NOTE — PROGRESS NOTES
" LOS: 2 days     Name: Anoop Montenegro  Age: 68 y.o.  Sex: male  :  1950  MRN: [id number]         Primary Care Physician: Jimbo Hurley MD    Subjective   Subjective  Reports improvement of confusion today.  Overall feels a little bit better.  Has more energy.  Still has a bit of a headache.  No fevers or chills.  Still shaky and unsteady on his feet.    Objective   Vital Signs  Temp:  [97.6 °F (36.4 °C)-98.9 °F (37.2 °C)] 98.1 °F (36.7 °C)  Heart Rate:  [57-73] 57  Resp:  [14-16] 16  BP: (111-140)/(79-93) 111/86  Body mass index is 26.96 kg/m².    Objective:  General Appearance:  Comfortable, in no acute distress and ill-appearing.    Vital signs: (most recent): Blood pressure 111/86, pulse 57, temperature 98.1 °F (36.7 °C), temperature source Oral, resp. rate 16, height 180.3 cm (71\"), weight 87.7 kg (193 lb 4.8 oz), SpO2 95 %.    Lungs:  Normal effort and normal respiratory rate.    Heart: Normal rate.  Regular rhythm.    Abdomen: Abdomen is soft.  Bowel sounds are normal.   There is no abdominal tenderness.     Extremities: There is no dependent edema or local swelling.    Neurological: Patient is alert and oriented to person, place and time.  (Still shaky and appears unsteady).    Skin:  Warm and dry.  (Petechial rash over the bilateral upper thighs and lower stomach is improving)            Results Review:       I reviewed the patient's new clinical results.    Results from last 7 days   Lab Units 19  0422 19  2015 19  0601 19  1634 19  0834 19  1436 19  1023   WBC 10*3/mm3 1.73*  --  1.13* 1.27* 1.49* 1.80* 2.79*   HEMOGLOBIN g/dL 13.4  --  13.4 15.0 15.1 15.2 15.2   PLATELETS 10*3/mm3 72* 77* 71* 75* 150 172 215     Results from last 7 days   Lab Units 19  0422 19  0601 19  1634 19  0834 19  1434   SODIUM mmol/L 138 132* 131* 134* 130*   POTASSIUM mmol/L 4.3 4.5 4.5 4.9 4.5   CHLORIDE mmol/L 107 100 95* 101 93*   TOTAL CO2 " mmol/L  --   --   --   --  24.5   CO2 mmol/L 24.0 24.2 25.3 21.1*  --    BUN mg/dL 16 23 26* 23 16   CREATININE mg/dL 0.93 1.11 1.31* 1.25 1.26   CALCIUM mg/dL 7.7* 7.7* 8.4* 8.2* 8.7   GLUCOSE mg/dL 89 99 123* 102*  --      Results from last 7 days   Lab Units 07/03/19  0422   INR  0.96             Scheduled Meds:     doxycycline 100 mg Oral Q12H   [START ON 7/4/2019] levothyroxine 125 mcg Oral Q AM   sodium chloride 3 mL Intravenous Q12H     PRN Meds:   •  acetaminophen  •  butalbital-acetaminophen-caffeine  •  nitroglycerin  •  ondansetron  •  ondansetron  •  sodium chloride  •  zolpidem  Continuous Infusions:    sodium chloride 75 mL/hr Last Rate: 75 mL/hr (07/03/19 1203)       Assessment/Plan   Active Hospital Problems    Diagnosis  POA   • **Fever and chills [R50.9]  Yes   • Leukopenia [D72.819]  Unknown   • Thrombocytopenia (CMS/HCC) [D69.6]  Unknown   • Raynaud's phenomenon without gangrene [I73.00]  Unknown   • Benign essential HTN [I10]  Yes   • Cervical spinal stenosis [M48.02]  Yes   • DDD (degenerative disc disease), cervical [M50.30]  Yes      Resolved Hospital Problems   No resolved problems to display.       Assessment & Plan    -Etiology of his acute illness is not entirely clear.  -Appreciate assistance from infectious disease.  He has been switched to oral doxycycline  -Hematology/oncology following and awaiting completion of infectious work-up.  White blood cell count looks a little bit better today and platelets appears stable.  May ultimately need a bone marrow biopsy depending on his course moving forward.  -CSF studies unremarkable  -CMV IgG/IgM negative.  Awaiting quantitative CMV.  EBV serologies pending.  HIV pending.  Acute hepatitis panel negative.  -TSH is elevated and I will increase his Synthroid dose today.  Repeat a level in 6 weeks.      Jomar Vieira MD  [facility]  07/03/19  1:11 PM

## 2019-07-03 NOTE — PLAN OF CARE
Problem: Patient Care Overview  Goal: Plan of Care Review  Outcome: Ongoing (interventions implemented as appropriate)   07/03/19 6575   Coping/Psychosocial   Plan of Care Reviewed With patient;spouse;son   Plan of Care Review   Progress no change   OTHER   Outcome Summary VSS. Doxycycline changed to PO, and IV fluids dc'd. TSH elevated-Synthroid increased from 100 mcg to 125 mcg. WBC increased slightly to 1.7. PT consulted. HIV negative. EBV elevated. Discussed with Dr. Vieira- this is likely old. Liver enzymes trending up. Disoriented to situation this afternoon. He is unable to directly answer questions at times. Discussed with Dr. Vieira- his metabolic encephalopathy is likely due to his acute and likely viral illness. Continue to monitor.      Goal: Interprofessional Rounds/Family Conf  Outcome: Ongoing (interventions implemented as appropriate)      Problem: Fall Risk (Adult)  Goal: Absence of Fall  Outcome: Ongoing (interventions implemented as appropriate)      Problem: Pain, Acute (Adult)  Goal: Identify Related Risk Factors and Signs and Symptoms  Outcome: Ongoing (interventions implemented as appropriate)    Goal: Acceptable Pain Control/Comfort Level  Outcome: Ongoing (interventions implemented as appropriate)

## 2019-07-03 NOTE — PROGRESS NOTES
INFECTIOUS DISEASES PROGRESS NOTE    CC: f/u fever    S:   More energy  Pain better  No f/c/ns    O:  Physical Exam:  Temp:  [97.6 °F (36.4 °C)-100.1 °F (37.8 °C)] 98.1 °F (36.7 °C)  Heart Rate:  [57-78] 57  Resp:  [14-16] 16  BP: (108-140)/(79-93) 111/86  Physical Exam   Constitutional: He appears well-developed. No distress.   Cardiovascular: Normal rate and regular rhythm.   Pulmonary/Chest: Effort normal.   Abdominal: Soft. He exhibits no distension. There is hepatosplenomegaly. There is no tenderness.   Neurological: He is alert.   Skin: Skin is warm and dry.   Psychiatric: He has a normal mood and affect. His behavior is normal.        Diagnostics:        Cr 0.9  WBC 1.73   PLT 72  H/H 13.4/40      Bcx NGTD  RPP neg  CSF cx ngtd      Assessment/Plan   1.  Leukopenia/neutropenia  2.  Thrombocytopenia   3.  Fever in adult  4.  Intermittent confusion    Feels better  WBC slightly increased which might go along with viral illness.  LP was negative  EBV and CMV studies pending  Doubt tick infection but will change to doxy to PO. Some new tick infections such as [place] virus possible but usually are sicker.        Juan Felder MD  8:10 AM  07/03/19

## 2019-07-03 NOTE — PLAN OF CARE
Problem: Patient Care Overview  Goal: Plan of Care Review   07/03/19 1612   OTHER   Outcome Summary Patient is a 68 y.o. male admitted to [facility] for leukopenia, thrombocytopenia, febrile illness on 7/1/2019. PMHx includes Raynaud's. Patient is independent at baseline and lives with his wife. Patient has one step to enter 3 level home but lives on main level. Today, patient required CGA for transfers, and ambulated 150 feet with rwx and CGA. Patient demonstrates impairments consisting of generalized weakness, decreased balance, decreased activity tolerance. Patient may benefit from skilled PT services acutely to address functional deficits as well as improve level of independence prior to discharge. Anticipate home with assist and HH upon DC.

## 2019-07-03 NOTE — THERAPY EVALUATION
"Acute Care - Physical Therapy Initial Evaluation  [facility]     Patient Name: Anoop Montenegro  : 1950  MRN: [id number]  Today's Date: 7/3/2019   Onset of Illness/Injury or Date of Surgery: 19            Admit Date: 2019    Visit Dx:     ICD-10-CM ICD-9-CM   1. Fever and chills R50.9 780.60   2. Leukopenia, unspecified type D72.819 288.50   3. Thrombocytopenia (CMS/HCC) D69.6 287.5   4. Generalized weakness R53.1 780.79     Patient Active Problem List   Diagnosis   • Cervical spinal stenosis   • DDD (degenerative disc disease), cervical   • Benign essential HTN   • Acute non-recurrent maxillary sinusitis   • Cough   • Fever and chills   • Leukopenia   • Thrombocytopenia (CMS/HCC)   • Raynaud's phenomenon without gangrene     Past Medical History:   Diagnosis Date   • Allergic rhinitis    • Arthritis    • Cancer (CMS/HCC)     skin   • Cervical spondylosis with myelopathy    • Elevated cholesterol    • FHx: colonic polyps    • GERD (gastroesophageal reflux disease)    • History of colonic polyps    • Hypertension     Benign Essential   • Hypothyroidism    • Insomnia    • Malaise and fatigue    • Pure hypercholesterolemia      Past Surgical History:   Procedure Laterality Date   • COLONOSCOPY N/A 2011   • COLONOSCOPY N/A 2005   • COLONOSCOPY N/A 11/15/2013   • CYST REMOVAL     • FRACTURE SURGERY      right thumb   • HAND SURGERY Left     3rd finger   • SKIN BIOPSY      scalp        PT ASSESSMENT (last 12 hours)      Physical Therapy Evaluation     Row Name 19 1530          PT Evaluation Time/Intention    Subjective Information  complains of;pain states his neck is sore, c/o feeling \"shakey\"   -EM     Document Type  evaluation  -EM     Mode of Treatment  physical therapy  -EM     Patient Effort  good  -EM     Row Name 19 1530          General Information    Onset of Illness/Injury or Date of Surgery  19  -EM     Patient Observations  alert;cooperative;agree to therapy  " -EM     Patient/Family Observations  wife at bedside   -EM     General Observations of Patient  patient sitting up in chair, awake and alert   -EM     Prior Level of Function  independent:;community mobility  -EM     Equipment Currently Used at Home  none  -EM     Pertinent History of Current Functional Problem  admitted with leukopenia, thrombocytopenia, viral illness?   -EM     Existing Precautions/Restrictions  fall  -EM     Row Name 07/03/19 1530          Relationship/Environment    Lives With  spouse  -EM     Row Name 07/03/19 1530          Resource/Environmental Concerns    Current Living Arrangements  home/apartment/condo  -EM     Row Name 07/03/19 1530          Home Main Entrance    Number of Stairs, Main Entrance  one  -EM     Row Name 07/03/19 1530          Cognitive Assessment/Intervention- PT/OT    Orientation Status (Cognition)  oriented x 3  -EM     Follows Commands (Cognition)  follows one step commands  -EM     Row Name 07/03/19 1530          Bed Mobility Assessment/Treatment    Bed Mobility Assessment/Treatment  supine-sit;sit-supine  -EM     Comment (Bed Mobility)  not tested, pt up in chair   -EM     Row Name 07/03/19 1530          Transfer Assessment/Treatment    Transfer Assessment/Treatment  sit-stand transfer;stand-sit transfer  -EM     Sit-Stand [place] (Transfers)  contact guard;verbal cues  -EM     Stand-Sit [place] (Transfers)  contact guard  -EM     Row Name 07/03/19 1530          Sit-Stand Transfer    Assistive Device (Sit-Stand Transfers)  walker, front-wheeled  -EM     Row Name 07/03/19 1530          Stand-Sit Transfer    Assistive Device (Stand-Sit Transfers)  walker, front-wheeled  -EM     Row Name 07/03/19 1530          Gait/Stairs Assessment/Training    [place] Level (Gait)  contact guard  -EM     Assistive Device (Gait)  walker, front-wheeled  -EM     Distance in Feet (Gait)  150  -EM     Bilateral Gait Deviations  forward flexed posture  -EM     Comment  (Gait/Stairs)  shakey, slightly unsteady but no gross LOB   -EM     Row Name 07/03/19 1530          General ROM    GENERAL ROM COMMENTS  ROM WNL   -EM     Row Name 07/03/19 1530          MMT (Manual Muscle Testing)    General MMT Comments  no focal deficits identified  -EM     Row Name 07/03/19 1530          Motor Assessment/Intervention    Additional Documentation  Balance (Group);Therapeutic Exercise (Group);Therapeutic Exercise Interventions (Group)  -EM     Row Name 07/03/19 1530          Therapeutic Exercise    Lower Extremity (Therapeutic Exercise)  LAQ (long arc quad), bilateral  -EM     Lower Extremity Range of Motion (Therapeutic Exercise)  ankle dorsiflexion/plantar flexion, bilateral  -EM     Sets/Reps (Therapeutic Exercise)  1/10  -EM     Row Name 07/03/19 1530          Balance    Balance  static standing balance  -EM     Row Name 07/03/19 1530          Static Standing Balance    Level of [place] (Supported Standing, Static Balance)  contact guard assist  -EM     Assistive Device Utilized (Supported Standing, Static Balance)  walker, rolling  -EM     Row Name 07/03/19 1530          Pain Assessment    Additional Documentation  Pain Scale: Word Pre/Post-Treatment (Group)  -EM     Row Name 07/03/19 1530          Pain Scale: Numbers Pre/Post-Treatment    Pain Location  neck  -EM     Row Name 07/03/19 1530          Pain Scale: Word Pre/Post-Treatment    Pain: Word Scale, Pretreatment  2 - mild pain  -EM     [facility] Name 07/03/19 1530          Physical Therapy Clinical Impression    Patient/Family Goals Statement (PT Clinical Impression)  get better, go home   -EM     Criteria for Skilled Interventions Met (PT Clinical Impression)  yes;treatment indicated  -EM     Impairments Found (describe specific impairments)  gait, locomotion, and balance;arousal, attention, and cognition  -EM     Row Name 07/03/19 1530          Physical Therapy Goals    Bed Mobility Goal Selection (PT)  bed mobility, PT goal 1  -EM      Transfer Goal Selection (PT)  transfer, PT goal 1  -EM     Gait Training Goal Selection (PT)  gait training, PT goal 1  -EM     Row Name 07/03/19 1530          Bed Mobility Goal 1 (PT)    Activity/Assistive Device (Bed Mobility Goal 1, PT)  bed mobility activities, all  -EM     [place] Level/Cues Needed (Bed Mobility Goal 1, PT)  supervision required  -EM     Time Frame (Bed Mobility Goal 1, PT)  1 week  -EM     Row Name 07/03/19 1530          Transfer Goal 1 (PT)    Activity/Assistive Device (Transfer Goal 1, PT)  transfers, all  -EM     [place] Level/Cues Needed (Transfer Goal 1, PT)  supervision required  -EM     Time Frame (Transfer Goal 1, PT)  1 week  -EM     Row Name 07/03/19 1530          Gait Training Goal 1 (PT)    Activity/Assistive Device (Gait Training Goal 1, PT)  gait (walking locomotion)  -EM     [place] Level (Gait Training Goal 1, PT)  supervision required  -EM     Distance (Gait Goal 1, PT)  300  -EM     Time Frame (Gait Training Goal 1, PT)  1 week  -EM     Row Name 07/03/19 1530          Positioning and Restraints    Pre-Treatment Position  sitting in chair/recliner  -EM     Post Treatment Position  chair  -EM     In Chair  sitting;call light within reach;with family/caregiver  -EM     Row Name 07/03/19 1530          Living Environment    Home Accessibility  stairs to enter home  -EM       User Key  (r) = Recorded By, (t) = Taken By, (c) = Cosigned By    Initials Name Provider Type    EM Raysa Hodges PT Physical Therapist        Physical Therapy Education     Title: PT OT SLP Therapies (In Progress)     Topic: Physical Therapy (In Progress)     Point: Mobility training (In Progress)     Learning Progress Summary           Patient Acceptance, E, NR by EM at 7/3/2019  4:12 PM                               User Key     Initials Effective Dates Name Provider Type Discipline    EM 04/03/18 -  Raysa Hodges PT Physical Therapist PT              PT Recommendation and  Plan  Anticipated Discharge Disposition (PT): home with assist, home with home health  Planned Therapy Interventions (PT Eval): bed mobility training, gait training, home exercise program  Therapy Frequency (PT Clinical Impression): daily  Outcome Summary/Treatment Plan (PT)  Anticipated Discharge Disposition (PT): home with assist, home with home health  Outcome Summary: Patient is a 68 y.o. male admitted to [facility] for leukopenia, thrombocytopenia, febrile illness on 7/1/2019. PMHx includes Raynaud's. Patient is independent at baseline and lives with his wife. Patient has one step to enter 3 level home but lives on main level. Today, patient required CGA for transfers, and ambulated 150 feet with rwx and CGA.  Patient demonstrates impairments consisting of generalized weakness, decreased balance, decreased activity tolerance. Patient may benefit from skilled PT services acutely to address functional deficits as well as improve level of independence prior to discharge. Anticipate home with assist and HH upon DC.  Outcome Measures     Row Name 07/03/19 1600             How much help from another person do you currently need...    Turning from your back to your side while in flat bed without using bedrails?  3  -EM      Moving from lying on back to sitting on the side of a flat bed without bedrails?  3  -EM      Moving to and from a bed to a chair (including a wheelchair)?  3  -EM      Standing up from a chair using your arms (e.g., wheelchair, bedside chair)?  3  -EM      Climbing 3-5 steps with a railing?  2  -EM      To walk in hospital room?  3  -EM      AM-PAC 6 Clicks Score (PT)  17  -EM         Functional Assessment    Outcome Measure Options  AM-PAC 6 Clicks Basic Mobility (PT)  -EM        User Key  (r) = Recorded By, (t) = Taken By, (c) = Cosigned By    Initials Name Provider Type    EM Raysa Hodges, PT Physical Therapist         Time Calculation:   PT Charges     Row Name 07/03/19 1774             Time  Calculation    Start Time  1440  -EM      Stop Time  1458  -EM      Time Calculation (min)  18 min  -EM      PT Received On  07/03/19  -EM      PT - Next Appointment  07/04/19  -EM      PT Goal Re-Cert Due Date  07/10/19  -EM         Time Calculation- PT    Total Timed Code Minutes- PT  8 minute(s)  -EM        User Key  (r) = Recorded By, (t) = Taken By, (c) = Cosigned By    Initials Name Provider Type    EM Raysa Hodges, PT Physical Therapist        Therapy Charges for Today     Code Description Service Date Service Provider Modifiers Qty    48627866631 HC PT EVAL MOD COMPLEXITY 2 7/3/2019 Raysa Hodgse, PT GP 1    21512226865 HC PT THER PROC EA 15 MIN 7/3/2019 Raysa Hodges, PT GP 1          PT G-Codes  Outcome Measure Options: AM-PAC 6 Clicks Basic Mobility (PT)  AM-PAC 6 Clicks Score (PT): 17      Ryasa Hodges PT  7/3/2019

## 2019-07-03 NOTE — PROGRESS NOTES
REASON FOR CONSULTATION: Leukopenia and thrombocytopenia  Provide an opinion on any further workup or treatment                             REQUESTING PHYSICIAN: Dr. Vieira     RECORDS OBTAINED:  Records of the patients history including those obtained from the referring provider were reviewed and summarized in detail.     HISTORY OF PRESENT ILLNESS:  The patient is a 68 y.o. year old male who is here for an opinion about the above issue.     History of Present Illness patient is a 68-year-old gentleman who was admitted.  He has [place] of Raynaud's phenomena.  Patient states that he had a thick attached to him on June 17, 2019 which he pulled.  Couple of days later he started having symptoms of fatigue and weakness and worsening low back pain.  This improved a few days later and he started having a fever.  The temperature was 100.3 with chills and felt bad.  He saw his primary care physician on June 27 who started him on doxycycline.  The next day he went to the primary care and had a CBC which showed a WBC count of 2000.  Further work-up showed CARMEL and double-stranded DNA were done.  He presented to the emergency room on June 30 which of starting stabbing left-sided chest pains with a temperature of 101.8.  He did travel to [place] and come back prior to being bitten by the tech.  Repeat work-up in the emergency room was done.  Diagnosis of tickborne illness was bait and serology for ehrlichiosis has been done.  He was placed on doxycycline and discharged but then he came back he became more confused and sleepy and sluggish.  The chest pain had resolved.     Looking at his blood counts is becoming more pancytopenic since June 27, 2019.  His hemoglobin was 15.2 and it stayed stable at 15.  But his white count was 2.79 it dropped down to 1.27 and his platelet dropped from 715-75.  His chest x-ray was negative.  CT head was negative.  There was concern that there is reactivation of CMV and Rudy-Barr viral  infection.     And underwent CT angiogram of the chest.  The pulmonary arteries well-developed opacified no pulmonary embolism.  The lungs were clear without any adenopathy.  CT abdomen pelvis was negative.  There is mild enlargement of the prostate 5.7 cm.     Infectious disease was concerned that leukopenia and thrombocytopenia were concerning for leukemia.  We were consulted because of that     Underwent CSF analysis today.  Flow was sent on the CSF and cytology was sent pending.  CSF protein is 65.,  CSF had 197 RBCs, 7 glucose is 60.  Ferritin 528.  HIV pending, Rudy-Barr virus pending and CMV pending, hepatitis profile nonreactive respiratory viral panel negative blood culture negative, lactic acid 0.02        CT head negative.  CBC 7 months ago was normal.  CARMEL negative.  Only recently her CBC is abnormal since this admission    Interval history:  7/3/2019: WBC improving,  Total WBC 1.73, ANC is 1.02. Platelets stable at 17763. Mr Montenegro is feeling slightly better today. Continues to experience chronic neck pain that is positional. The patient's wife is still concerned about his confusion upon admission. He is able to answer questions appropriately and is oriented to person, place and time but cannot recall why he is in the hospital.       Medical History        Past Medical History:   Diagnosis Date   • Allergic rhinitis     • Arthritis     • Cancer (CMS/HCC)       skin   • Cervical spondylosis with myelopathy     • Elevated cholesterol     • FHx: colonic polyps     • GERD (gastroesophageal reflux disease)     • History of colonic polyps     • Hypertension       Benign Essential   • Hypothyroidism     • Insomnia     • Malaise and fatigue     • Pure hypercholesterolemia              Surgical History         Past Surgical History:   Procedure Laterality Date   • COLONOSCOPY N/A 11/04/2011   • COLONOSCOPY N/A 06/2005   • COLONOSCOPY N/A 11/15/2013   • CYST REMOVAL   2014   • FRACTURE SURGERY         right  thumb   • HAND SURGERY Left       3rd finger   • SKIN BIOPSY         scalp            No current facility-administered medications on file prior to encounter.              Current Outpatient Medications on File Prior to Encounter   Medication Sig Dispense Refill   • atorvastatin (LIPITOR) 20 MG tablet Take 20 mg by mouth Daily.       • doxycycline (ADOXA) 100 MG tablet Take 1 tablet by mouth 2 (Two) Times a Day for 14 days. 28 tablet 0   • gabapentin (NEURONTIN) 300 MG capsule Take 1 capsule by mouth Every Night. 15 capsule 0   • HYDROcodone-acetaminophen (NORCO) 5-325 MG per tablet Take 1 tablet by mouth Every 6 (Six) Hours As Needed for Moderate Pain . 8 tablet 0   • irbesartan (AVAPRO) 300 MG tablet Take 1 tablet by mouth Daily. 90 tablet 1   • levothyroxine (SYNTHROID, LEVOTHROID) 100 MCG tablet Take 1 tablet by mouth Daily. 90 tablet 1   • [] ondansetron (ZOFRAN) 4 MG tablet Take 1 tablet by mouth Every 8 (Eight) Hours As Needed for Nausea or Vomiting for up to 3 days. 30 tablet 0   • sildenafil (VIAGRA) 100 MG tablet Take 1 tablet by mouth Daily As Needed for erectile dysfunction. 10 tablet 3         ALLERGIES:  No Known Allergies      Social History   Social History            Socioeconomic History   • Marital status:        Spouse name: Not on file   • Number of children: 2   • Years of education: POST GRAD   • Highest education level: Not on file   Occupational History   • Occupation: RETIRED   Tobacco Use   • Smoking status: Never Smoker   • Smokeless tobacco: Never Used   Substance and Sexual Activity   • Alcohol use: Yes       Alcohol/week: 0.6 oz       Types: 1 Shots of liquor per week       Comment: 3-5 times a week   • Drug use: Defer   • Sexual activity: Defer   Social History Narrative     LIVES WITH SPOUSE                  Family History   Problem Relation Age of Onset   • Hypertension Mother     • Heart failure Mother     • Hearing loss Mother     • Hyperlipidemia Mother     • Aortic  aneurysm Father     • Arthritis Father     • Prostate cancer Brother     • Heart disease Brother     • Hypertension Brother     • Liver cancer Sister     • Breast cancer Sister     • Cancer Sister     • Hyperlipidemia Sister     • Cancer Maternal Uncle     • Heart disease Maternal Uncle           Review of Systems   Constitutional: Positive for fatigue and fever. Negative for appetite change, chills, diaphoresis and unexpected weight change.   HENT: Negative for hearing loss, sore throat and trouble swallowing.    Respiratory: . Negative for cough, chest tightness and wheezing.    Cardiovascular: Negative for chest pain, palpitations and leg swelling.   Gastrointestinal: Negative for abdominal distention, abdominal pain, constipation, diarrhea, nausea and vomiting.   Genitourinary: Negative for dysuria, frequency, hematuria and urgency.   Musculoskeletal: Negative for joint swelling.   Skin: Negative for rash and wound.   Neurological: Negative for seizures, syncope, speech difficulty, weakness, numbness and headaches.   Hematological: Negative for adenopathy. Does not bruise/bleed easily.   Psychiatric/Behavioral: Negative for behavioral problems, confusion and suicidal ideas. Confusion per wife's report. Improving.       Results for TERESA EVANS (MRN [id number]) as of 7/3/2019 15:54   Ref. Range 7/3/2019 04:22   Glucose Latest Ref Range: 65 - 99 mg/dL 89   Sodium Latest Ref Range: 136 - 145 mmol/L 138   Potassium Latest Ref Range: 3.5 - 5.2 mmol/L 4.3   CO2 Latest Ref Range: 22.0 - 29.0 mmol/L 24.0   Chloride Latest Ref Range: 98 - 107 mmol/L 107   Anion Gap Latest Ref Range: 5.0 - 15.0 mmol/L 7.0   Creatinine Latest Ref Range: 0.76 - 1.27 mg/dL 0.93   BUN Latest Ref Range: 8 - 23 mg/dL 16   BUN/Creatinine Ratio Latest Ref Range: 7.0 - 25.0  17.2   Calcium Latest Ref Range: 8.6 - 10.5 mg/dL 7.7 (L)   eGFR Non African Am Latest Ref Range: >60 mL/min/1.73 81   Alkaline Phosphatase Latest Ref Range: 39 - 117 U/L  66   Total Protein Latest Ref Range: 6.0 - 8.5 g/dL 5.2 (L)   ALT (SGPT) Latest Ref Range: 1 - 41 U/L 140 (H)   AST (SGOT) Latest Ref Range: 1 - 40 U/L 205 (H)   Total Bilirubin Latest Ref Range: 0.2 - 1.2 mg/dL 0.2   Albumin Latest Ref Range: 3.50 - 5.20 g/dL 2.90 (L)   Globulin Latest Units: gm/dL 2.3   A/G Ratio Latest Units: g/dL 1.3   TSH Baseline Latest Ref Range: 0.270 - 4.200 mIU/mL 9.750 (H)   Folate Latest Ref Range: 4.78 - 24.20 ng/mL 18.80   Magnesium Latest Ref Range: 1.6 - 2.4 mg/dL 1.9   Vitamin B-12 Latest Ref Range: 211 - 946 pg/mL 666   Protime Latest Ref Range: 11.7 - 14.2 Seconds 12.4   INR Latest Ref Range: 0.90 - 1.10  0.96   WBC Latest Ref Range: 3.40 - 10.80 10*3/mm3 1.73 (C)   RBC Latest Ref Range: 4.14 - 5.80 10*6/mm3 4.39   Hemoglobin Latest Ref Range: 13.0 - 17.7 g/dL 13.4   Hematocrit Latest Ref Range: 37.5 - 51.0 % 39.8   RDW Latest Ref Range: 12.3 - 15.4 % 13.5   MCV Latest Ref Range: 79.0 - 97.0 fL 90.7   MCH Latest Ref Range: 26.6 - 33.0 pg 30.5   MCHC Latest Ref Range: 31.5 - 35.7 g/dL 33.7   MPV Latest Ref Range: 6.0 - 12.0 fL 11.0   Platelets Latest Ref Range: 140 - 450 10*3/mm3 72 (L)   RDW-SD Latest Ref Range: 37.0 - 54.0 fl 45.2   Neutrophil Rel % Latest Ref Range: 42.7 - 76.0 % 59.0   Lymphocyte Rel % Latest Ref Range: 19.6 - 45.3 % 34.0   Atypical Lymphocyte % Latest Ref Range: 0.0 - 5.0 % 1.0   Monocyte Rel % Latest Ref Range: 5.0 - 12.0 % 6.0   Neutrophils Absolute Latest Ref Range: 1.70 - 7.00 10*3/mm3 1.02 (L)   Lymphocytes Absolute Latest Ref Range: 0.70 - 3.10 10*3/mm3 0.59 (L)   Monocytes Absolute Latest Ref Range: 0.10 - 0.90 10*3/mm3 0.10   WBC Morphology Latest Ref Range: Normal  Normal   RBC morphology Latest Ref Range: Normal  Normal   Platelet Morphology Latest Ref Range: Normal  Normal               Physical Exam       GENERAL:  Well-developed, well-nourished in no acute distress.   NECK:  Supple with good range of motion; no thyromegaly or masses, no  JVD.  LYMPHATICS:  No cervical, supraclavicular, axillary or inguinal adenopathy.  CHEST:  Normal respiratory effort  CARDIAC:  Regular rate   ABDOMEN:  Soft, nontender with no hepatosplenomegaly or masses.  EXTREMITIES:  No clubbing, cyanosis or edema.  NEUROLOGICAL:  Cranial Nerves II-XII grossly intact.  No focal neurological deficits.  PSYCHIATRIC:  Normal affect and mood. Asleep.        Lab Results   Component Value Date    WBC 1.73 (C) 07/03/2019    HGB 13.4 07/03/2019    HCT 39.8 07/03/2019    MCV 90.7 07/03/2019    PLT 72 (L) 07/03/2019                  Assessment/Plan         1.  Fever with neutropenia and thrombocytopenia currently being evaluated by infectious disease -CSF studies unremarkable. CMV IgG/IgM negative.  Awaiting quantitative CMV.  EBV serologies pending.  HIV pending.  Acute hepatitis panel negative.    Total WBC and ANC are improving. Platelet count stable. We will continue to monitor. If his counts trending down  we may need to consider bone marrow aspiration and biopsy but at this point there is suspicion for infectious cause.     I examined the patient today with Dr. Johnson.

## 2019-07-04 VITALS
BODY MASS INDEX: 27.06 KG/M2 | OXYGEN SATURATION: 94 % | WEIGHT: 193.3 LBS | HEIGHT: 71 IN | TEMPERATURE: 97.5 F | SYSTOLIC BLOOD PRESSURE: 133 MMHG | RESPIRATION RATE: 20 BRPM | DIASTOLIC BLOOD PRESSURE: 89 MMHG | HEART RATE: 62 BPM

## 2019-07-04 LAB
ALBUMIN SERPL-MCNC: 2.4 G/DL (ref 3.5–5.2)
ALBUMIN/GLOB SERPL: 0.8 G/DL
ALP SERPL-CCNC: 69 U/L (ref 39–117)
ALT SERPL W P-5'-P-CCNC: 153 U/L (ref 1–41)
ANION GAP SERPL CALCULATED.3IONS-SCNC: 10.4 MMOL/L (ref 5–15)
AST SERPL-CCNC: 219 U/L (ref 1–40)
BILIRUB SERPL-MCNC: 0.2 MG/DL (ref 0.2–1.2)
BUN BLD-MCNC: 12 MG/DL (ref 8–23)
BUN/CREAT SERPL: 12.4 (ref 7–25)
CALCIUM SPEC-SCNC: 8.5 MG/DL (ref 8.6–10.5)
CHLORIDE SERPL-SCNC: 103 MMOL/L (ref 98–107)
CO2 SERPL-SCNC: 23.6 MMOL/L (ref 22–29)
CREAT BLD-MCNC: 0.97 MG/DL (ref 0.76–1.27)
DEPRECATED RDW RBC AUTO: 44.5 FL (ref 37–54)
EOSINOPHIL # BLD MANUAL: 0.04 10*3/MM3 (ref 0–0.4)
EOSINOPHIL NFR BLD MANUAL: 2.2 % (ref 0.3–6.2)
ERYTHROCYTE [DISTWIDTH] IN BLOOD BY AUTOMATED COUNT: 13.6 % (ref 12.3–15.4)
GFR SERPL CREATININE-BSD FRML MDRD: 77 ML/MIN/1.73
GIANT PLATELETS: ABNORMAL
GLOBULIN UR ELPH-MCNC: 3.1 GM/DL
GLUCOSE BLD-MCNC: 95 MG/DL (ref 65–99)
HCT VFR BLD AUTO: 40.4 % (ref 37.5–51)
HGB BLD-MCNC: 13.7 G/DL (ref 13–17.7)
LYMPHOCYTES # BLD MANUAL: 0.6 10*3/MM3 (ref 0.7–3.1)
LYMPHOCYTES NFR BLD MANUAL: 32.6 % (ref 19.6–45.3)
LYMPHOCYTES NFR BLD MANUAL: 9.8 % (ref 5–12)
MCH RBC QN AUTO: 30.2 PG (ref 26.6–33)
MCHC RBC AUTO-ENTMCNC: 33.9 G/DL (ref 31.5–35.7)
MCV RBC AUTO: 89 FL (ref 79–97)
MONOCYTES # BLD AUTO: 0.18 10*3/MM3 (ref 0.1–0.9)
NEUTROPHILS # BLD AUTO: 1.02 10*3/MM3 (ref 1.7–7)
NEUTROPHILS NFR BLD MANUAL: 55.4 % (ref 42.7–76)
PLATELET # BLD AUTO: 81 10*3/MM3 (ref 140–450)
PMV BLD AUTO: 10.9 FL (ref 6–12)
POTASSIUM BLD-SCNC: 4.3 MMOL/L (ref 3.5–5.2)
PROT SERPL-MCNC: 5.5 G/DL (ref 6–8.5)
RBC # BLD AUTO: 4.54 10*6/MM3 (ref 4.14–5.8)
RBC MORPH BLD: NORMAL
SMUDGE CELLS BLD QL SMEAR: ABNORMAL
SODIUM BLD-SCNC: 137 MMOL/L (ref 136–145)
WBC NRBC COR # BLD: 1.85 10*3/MM3 (ref 3.4–10.8)

## 2019-07-04 PROCEDURE — 99232 SBSQ HOSP IP/OBS MODERATE 35: CPT | Performed by: INTERNAL MEDICINE

## 2019-07-04 PROCEDURE — 80053 COMPREHEN METABOLIC PANEL: CPT | Performed by: INTERNAL MEDICINE

## 2019-07-04 PROCEDURE — 85007 BL SMEAR W/DIFF WBC COUNT: CPT | Performed by: INTERNAL MEDICINE

## 2019-07-04 PROCEDURE — 85025 COMPLETE CBC W/AUTO DIFF WBC: CPT | Performed by: INTERNAL MEDICINE

## 2019-07-04 RX ORDER — DOXYCYCLINE 100 MG/1
100 CAPSULE ORAL EVERY 12 HOURS SCHEDULED
Qty: 7 CAPSULE | Refills: 0 | Status: SHIPPED | OUTPATIENT
Start: 2019-07-04 | End: 2019-07-08

## 2019-07-04 RX ORDER — LEVOTHYROXINE SODIUM 0.12 MG/1
125 TABLET ORAL DAILY
Qty: 30 TABLET | Refills: 0 | Status: SHIPPED | OUTPATIENT
Start: 2019-07-04 | End: 2019-07-24 | Stop reason: SDUPTHER

## 2019-07-04 RX ADMIN — LEVOTHYROXINE SODIUM 125 MCG: 125 TABLET ORAL at 08:31

## 2019-07-04 RX ADMIN — DOXYCYCLINE 100 MG: 100 CAPSULE ORAL at 08:31

## 2019-07-04 RX ADMIN — SODIUM CHLORIDE, PRESERVATIVE FREE 3 ML: 5 INJECTION INTRAVENOUS at 08:32

## 2019-07-04 RX ADMIN — BUTALBITAL, ACETAMINOPHEN, AND CAFFEINE 1 TABLET: 50; 325; 40 TABLET ORAL at 12:09

## 2019-07-04 NOTE — PLAN OF CARE
Problem: Patient Care Overview  Goal: Plan of Care Review  Outcome: Ongoing (interventions implemented as appropriate)   07/04/19 0226   Coping/Psychosocial   Plan of Care Reviewed With patient   OTHER   Outcome Summary pt c/o headache, medicated with prn meds. pt very unsteady and shaky when oob. wife at bedside and bed alarm on from MN assessment and on. cont to monitor       Problem: Fall Risk (Adult)  Goal: Absence of Fall  Outcome: Ongoing (interventions implemented as appropriate)      Problem: Pain, Acute (Adult)  Goal: Acceptable Pain Control/Comfort Level  Outcome: Ongoing (interventions implemented as appropriate)

## 2019-07-04 NOTE — DISCHARGE SUMMARY
Date of Admission: 7/1/2019  Date of Discharge:  7/4/2019  Primary Care Physician: Jimbo Hurley MD     Discharge Diagnosis:  Active Hospital Problems    Diagnosis  POA   • **Fever and chills [R50.9]  Yes   • Leukopenia [D72.819]  Unknown   • Thrombocytopenia (CMS/HCC) [D69.6]  Unknown   • Raynaud's phenomenon without gangrene [I73.00]  Unknown   • Benign essential HTN [I10]  Yes   • Cervical spinal stenosis [M48.02]  Yes   • DDD (degenerative disc disease), cervical [M50.30]  Yes      Resolved Hospital Problems   No resolved problems to display.       DETAILS OF HOSPITAL STAY     Pertinent Test Results and Procedures Performed  Chest x-ray showed no acute infiltrate, consolidation, or effusion    CT angiogram of the chest:  1. The pulmonary arteries are well-opacified and there is no evidence of  pulmonary embolus. The thoracic aorta shows no evidence of aneurysm or  dissection.  2. The lungs are well-expanded and clear. There is no mediastinal or  hilar or axillary adenopathy. There is no significant pericardial  effusion.  3. The liver, spleen, pancreas, both adrenal glands, and both kidneys  are unremarkable. The gallbladder shows no gallstones or wall  thickening.  4. There is no aortic aneurysm or retroperitoneal lymphadenopathy. The  large and small bowel loops are normal in caliber and show no  inflammatory change. In the pelvis there is slight enlargement of the  prostate measuring 5.7 cm. The pelvis is otherwise unremarkable.  5. There are very severe degenerative changes throughout the lumbar  spine with slight upper lumbar scoliosis convex to the right.    Head CT shows no acute intracranial abnormality    The patient underwent lumbar puncture on 7/2/2019 with no evidence of meningitis    Hospital Course  This is a 68-year-old male who presented to the emergency room with complaints of fever, episodic confusion, and progressive weakness.  Please see H&P for full details of admission.  The etiology  of his febrile illness was unclear upon presentation.  He was taking doxycycline and as an outpatient and this was continued upon admission.  Infectious disease was consulted.  The patient had extensive work-up including viral studies looking for CMV, EBV, general respiratory viruses as well as work-up for tickborne illness including early ketosis.  All this was negative for acute infection.  He had a lumbar puncture that did not show any evidence of meningitis.  The other consideration was an acute leukemia given his leukopenia and thrombocytopenia.  He was evaluated by hematology/oncology who felt that an infectious etiology was much more likely.  His cell counts are starting to improve and at this time it is not recommended that he undergo bone marrow biopsy.  Today infectious disease feels that his illness represents an unidentified virus but, unfortunately, we are at the end of our diagnostic abilities for such.  Infectious disease recommends he remain on doxycycline through 7/8/2019.  The patient has shown gradual improvement with supportive care.  His confusion is improved.  He has been left with significant weakness.  He has been working with physical therapy and we will provide him a rolling walker and I will order home health upon his discharge.  Infectious disease has cleared him for discharge.  We will make sure that hematology is okay with this and the patient has been instructed to have a repeat CMP and CBC drawn in 1 week and follow-up with his primary care physician thereafter.  He will follow-up with Dr. Felder of infectious disease in 6 weeks and with hematology per their recommendations.  I discussed this with the patient and his wife today.  He would very much like to go ahead and be discharged so he can recuperate at home given that we will not be running any additional diagnostic testing at this time.    Physical Exam at Discharge:  General: No acute distress, AAOx3, weak and deconditioned  appearing  HEENT: EOMI, PERRL  Cardiovascular: +s1 and s2, RRR  Lungs: No rhonchi or wheezing  Abdomen: soft, nontender    Consults:   Consults     Date and Time Order Name Status Description    7/2/2019 1149 Hematology & Oncology Inpatient Consult      7/1/2019 2315 Inpatient Infectious Diseases Consult Completed     7/1/2019 5796 [facility] (on-call MD unless specified) Details Completed     7/1/2019 2768 Family Medicine Consult Completed             Condition on Discharge: Stable, improved    Discharge Disposition  Home or Self Care    Discharge Medications     Discharge Medications      New Medications      Instructions Start Date   doxycycline 100 MG capsule  Commonly known as:  MONODOX  Replaces:  doxycycline 100 MG tablet   100 mg, Oral, Every 12 Hours Scheduled         Changes to Medications      Instructions Start Date   levothyroxine 125 MCG tablet  Commonly known as:  SYNTHROID, LEVOTHROID  What changed:    · medication strength  · how much to take   125 mcg, Oral, Daily         Continue These Medications      Instructions Start Date   atorvastatin 20 MG tablet  Commonly known as:  LIPITOR   20 mg, Oral, Daily      HYDROcodone-acetaminophen 5-325 MG per tablet  Commonly known as:  NORCO   1 tablet, Oral, Every 6 Hours PRN         Stop These Medications    doxycycline 100 MG tablet  Commonly known as:  ADOXA  Replaced by:  doxycycline 100 MG capsule     gabapentin 300 MG capsule  Commonly known as:  NEURONTIN     irbesartan 300 MG tablet  Commonly known as:  AVAPRO     ondansetron 4 MG tablet  Commonly known as:  ZOFRAN     sildenafil 100 MG tablet  Commonly known as:  VIAGRA            Discharge Diet:   Diet Instructions     Diet: Regular; Thin      Discharge Diet:  Regular    Fluid Consistency:  Thin          Activity at Discharge:   Activity Instructions     Activity as Tolerated            Follow-up Appointments  No future appointments.  Additional Instructions for the Follow-ups that You Need to Schedule      Discharge Follow-up with PCP   As directed       Currently Documented PCP:    Jimbo Hurley MD    PCP Phone Number:    [phone]     Follow Up Details:  1-2 weeks         Discharge Follow-up with Specified Provider: Dr. Felder in 6 weeks   As directed      To:  Dr. Felder in 6 weeks         Discharge Follow-up with Specified Provider: Hematology per their recommendations   As directed      To:  Hematology per their recommendations         CBC & Differential    Jul 11, 2019 (Approximate)      Manual Differential:  No         Comprehensive Metabolic Panel    Jul 11, 2019 (Approximate)            Test Results Pending at Discharge   Order Current Status    CMV DNA, Quantitative, PCR In process    Non-gynecologic Cytology In process    Blood Culture - Blood, Arm, Left Preliminary result    Blood Culture - Blood, Arm, Left Preliminary result    Culture, CSF - Cerebrospinal Fluid, Lumbar Puncture Preliminary result          I have examined and discussed discharge planning with the patient today.     Jomar Vieira MD  07/04/19  1:36 PM    Time: Discharge greater than 30 min

## 2019-07-04 NOTE — PROGRESS NOTES
INFECTIOUS DISEASES PROGRESS NOTE    CC: f/u fever    S:   He is anxious to go home  He is improving but had some urinary frequency--no dysuria  No f/c/ns  Ha and neck pain stable    O:  Physical Exam:  Temp:  [97.4 °F (36.3 °C)-98.2 °F (36.8 °C)] 97.4 °F (36.3 °C)  Heart Rate:  [59-85] 64  Resp:  [16-18] 18  BP: (124-151)/(87-99) 151/95  Physical Exam   Constitutional: He appears well-developed. No distress.   Pulmonary/Chest: Effort normal.   Abdominal: Soft. He exhibits no distension. There is no tenderness.   Neurological: He is alert.   Skin: Skin is warm and dry.   Psychiatric: He has a normal mood and affect. His behavior is normal.        Diagnostics:        WBC 1.85 (p55, L 33, M 10, E2)  H/H 13.7/40  PLT 81           Assessment/Plan   1.  Leukopenia/neutropenia  2.  Thrombocytopenia   3.  Fever in adult  4.  Intermittent confusion  5.  Hepatitis     Improving. EBV c/w past infection and CMV negative. I told the family I thought the most probable diagnosis with a viral infection but we are at the limits of diagnosis.  Next step would be for liver vs bone marrow biopsy but I think they would be negative and do not recommend them. Pt agrees.  I recommended to cont on the doxycyline through 7/8.  Repeat CMP and CBC next week.  Can follow up with me in 6 weeks to check convalescent titers.    No objection to d/c when okay with others      Juan Felder MD  8:40 AM  07/04/19

## 2019-07-04 NOTE — PROGRESS NOTES
Continued Stay Note  [facility]     Patient Name: Anoop Montenegro  MRN: [id number]  Today's Date: 7/4/2019    Admit Date: 7/1/2019    Discharge Plan     Row Name 07/04/19 1324       Plan    Plan  Home with spouse    Plan Comments  Met with patient and discussed DC planning.  Verified patient is in inpatient status and his medications are covered by insurance per patient's questions.  Patient declines home health at this time.  Spouse will transport.  Denies any other needs.  ...........................Annalise Leon RN        Discharge Codes    No documentation.             Annalise Leon RN

## 2019-07-04 NOTE — PROGRESS NOTES
REASON FOR CONSULTATION: Leukopenia and thrombocytopenia  Provide an opinion on any further workup or treatment                             REQUESTING PHYSICIAN: Dr. Vieira     RECORDS OBTAINED:  Records of the patients history including those obtained from the referring provider were reviewed and summarized in detail.     HISTORY OF PRESENT ILLNESS:  The patient is a 68 y.o. year old male who is here for an opinion about the above issue.     History of Present Illness patient is a 68-year-old gentleman who was admitted.  He has [place] of Raynaud's phenomena.  Patient states that he had a thick attached to him on June 17, 2019 which he pulled.  Couple of days later he started having symptoms of fatigue and weakness and worsening low back pain.  This improved a few days later and he started having a fever.  The temperature was 100.3 with chills and felt bad.  He saw his primary care physician on June 27 who started him on doxycycline.  The next day he went to the primary care and had a CBC which showed a WBC count of 2000.  Further work-up showed CARMEL and double-stranded DNA were done.  He presented to the emergency room on June 30 which of starting stabbing left-sided chest pains with a temperature of 101.8.  He did travel to [place] and come back prior to being bitten by the tech.  Repeat work-up in the emergency room was done.  Diagnosis of tickborne illness was bait and serology for ehrlichiosis has been done.  He was placed on doxycycline and discharged but then he came back he became more confused and sleepy and sluggish.  The chest pain had resolved.     Looking at his blood counts is becoming more pancytopenic since June 27, 2019.  His hemoglobin was 15.2 and it stayed stable at 15.  But his white count was 2.79 it dropped down to 1.27 and his platelet dropped from 715-75.  His chest x-ray was negative.  CT head was negative.  There was concern that there is reactivation of CMV and Rudy-Barr viral  infection.     And underwent CT angiogram of the chest.  The pulmonary arteries well-developed opacified no pulmonary embolism.  The lungs were clear without any adenopathy.  CT abdomen pelvis was negative.  There is mild enlargement of the prostate 5.7 cm.     Infectious disease was concerned that leukopenia and thrombocytopenia were concerning for leukemia.  We were consulted because of that     Underwent CSF analysis today.  Flow was sent on the CSF and cytology was sent pending.  CSF protein is 65.,  CSF had 197 RBCs, 7 glucose is 60.  Ferritin 528.  HIV pending, Rudy-Barr virus pending and CMV pending, hepatitis profile nonreactive respiratory viral panel negative blood culture negative, lactic acid 0.02        CT head negative.  CBC 7 months ago was normal.  CARMEL negative.  Only recently her CBC is abnormal since this admission    Interval history:  7/3/2019: WBC improving,  Total WBC 1.73, ANC is 1.02. [place] at [address]. Mr Montenegro is feeling slightly better today. Continues to experience chronic neck pain that is positional. The patient's wife is still concerned about his confusion upon admission. He is able to answer questions appropriately and is oriented to person, place and time but cannot recall why he is in the hospital.     July 4, 2019: Rudy-Barr virus is consistent with past infection.  CMV negative.  Infectious disease thinks that this is a viral infection.  Patient is on doxycycline to continue till July 8.  CBC slowly improving with WBC count improved to 1.5 and platelet count has come up from 72,000- 81,000  There is no immature cells.  I have reviewed the peripheral smear and there is no evidence of any blasts.  The white cells have been decreased.  Patient is now afebrile and vital signs are stable  Fibrin split products greater than 20, IPF is 5, CSF cytology pending, CSF cultures no growth.  Flow cytometry is negative.  Fibrinogen is on the low side 214.  B12 and RBC folate are  normal    Counts are certainly improving.  I do not think we need to do a bone marrow at this point.  If he does not show resolution and counts drop back then we can consider in future.        Medical History        Past Medical History:   Diagnosis Date   • Allergic rhinitis     • Arthritis     • Cancer (CMS/HCC)       skin   • Cervical spondylosis with myelopathy     • Elevated cholesterol     • FHx: colonic polyps     • GERD (gastroesophageal reflux disease)     • History of colonic polyps     • Hypertension       Benign Essential   • Hypothyroidism     • Insomnia     • Malaise and fatigue     • Pure hypercholesterolemia              Surgical History         Past Surgical History:   Procedure Laterality Date   • COLONOSCOPY N/A 2011   • COLONOSCOPY N/A 2005   • COLONOSCOPY N/A 11/15/2013   • CYST REMOVAL      • FRACTURE SURGERY         right thumb   • HAND SURGERY Left       3rd finger   • SKIN BIOPSY         scalp            No current facility-administered medications on file prior to encounter.              Current Outpatient Medications on File Prior to Encounter   Medication Sig Dispense Refill   • atorvastatin (LIPITOR) 20 MG tablet Take 20 mg by mouth Daily.       • doxycycline (ADOXA) 100 MG tablet Take 1 tablet by mouth 2 (Two) Times a Day for 14 days. 28 tablet 0   • gabapentin (NEURONTIN) 300 MG capsule Take 1 capsule by mouth Every Night. 15 capsule 0   • HYDROcodone-acetaminophen (NORCO) 5-325 MG per tablet Take 1 tablet by mouth Every 6 (Six) Hours As Needed for Moderate Pain . 8 tablet 0   • irbesartan (AVAPRO) 300 MG tablet Take 1 tablet by mouth Daily. 90 tablet 1   • levothyroxine (SYNTHROID, LEVOTHROID) 100 MCG tablet Take 1 tablet by mouth Daily. 90 tablet 1   • [] ondansetron (ZOFRAN) 4 MG tablet Take 1 tablet by mouth Every 8 (Eight) Hours As Needed for Nausea or Vomiting for up to 3 days. 30 tablet 0   • sildenafil (VIAGRA) 100 MG tablet Take 1 tablet by mouth Daily As  Needed for erectile dysfunction. 10 tablet 3         ALLERGIES:  No Known Allergies      Social History   Social History            Socioeconomic History   • Marital status:        Spouse name: Not on file   • Number of children: 2   • Years of education: POST GRAD   • Highest education level: Not on file   Occupational History   • Occupation: RETIRED   Tobacco Use   • Smoking status: Never Smoker   • Smokeless tobacco: Never Used   Substance and Sexual Activity   • Alcohol use: Yes       Alcohol/week: 0.6 oz       Types: 1 Shots of liquor per week       Comment: 3-5 times a week   • Drug use: Defer   • Sexual activity: Defer   Social History Narrative     LIVES WITH SPOUSE                  Family History   Problem Relation Age of Onset   • Hypertension Mother     • Heart failure Mother     • Hearing loss Mother     • Hyperlipidemia Mother     • Aortic aneurysm Father     • Arthritis Father     • Prostate cancer Brother     • Heart disease Brother     • Hypertension Brother     • Liver cancer Sister     • Breast cancer Sister     • Cancer Sister     • Hyperlipidemia Sister     • Cancer Maternal Uncle     • Heart disease Maternal Uncle           Review of Systems   Constitutional: Positive for fatigue and fever. Negative for appetite change, chills, diaphoresis and unexpected weight change.   HENT: Negative for hearing loss, sore throat and trouble swallowing.    Respiratory: . Negative for cough, chest tightness and wheezing.    Cardiovascular: Negative for chest pain, palpitations and leg swelling.   Gastrointestinal: Negative for abdominal distention, abdominal pain, constipation, diarrhea, nausea and vomiting.   Genitourinary: Negative for dysuria, frequency, hematuria and urgency.   Musculoskeletal: Negative for joint swelling.   Skin: Negative for rash and wound.   Neurological: Negative for seizures, syncope, speech difficulty, weakness, numbness and headaches.   Hematological: Negative for adenopathy.  Does not bruise/bleed easily.   Psychiatric/Behavioral: Negative for behavioral problems, confusion and suicidal ideas. Confusion per wife's report. Improving.       Results for TERESA EVANS (MRN [id number]) as of 7/3/2019 15:54   Ref. Range 7/3/2019 04:22   Glucose Latest Ref Range: 65 - 99 mg/dL 89   Sodium Latest Ref Range: 136 - 145 mmol/L 138   Potassium Latest Ref Range: 3.5 - 5.2 mmol/L 4.3   CO2 Latest Ref Range: 22.0 - 29.0 mmol/L 24.0   Chloride Latest Ref Range: 98 - 107 mmol/L 107   Anion Gap Latest Ref Range: 5.0 - 15.0 mmol/L 7.0   Creatinine Latest Ref Range: 0.76 - 1.27 mg/dL 0.93   BUN Latest Ref Range: 8 - 23 mg/dL 16   BUN/Creatinine Ratio Latest Ref Range: 7.0 - 25.0  17.2   Calcium Latest Ref Range: 8.6 - 10.5 mg/dL 7.7 (L)   eGFR Non African Am Latest Ref Range: >60 mL/min/1.73 81   Alkaline Phosphatase Latest Ref Range: 39 - 117 U/L 66   Total Protein Latest Ref Range: 6.0 - 8.5 g/dL 5.2 (L)   ALT (SGPT) Latest Ref Range: 1 - 41 U/L 140 (H)   AST (SGOT) Latest Ref Range: 1 - 40 U/L 205 (H)   Total Bilirubin Latest Ref Range: 0.2 - 1.2 mg/dL 0.2   Albumin Latest Ref Range: 3.50 - 5.20 g/dL 2.90 (L)   Globulin Latest Units: gm/dL 2.3   A/G Ratio Latest Units: g/dL 1.3   TSH Baseline Latest Ref Range: 0.270 - 4.200 mIU/mL 9.750 (H)   Folate Latest Ref Range: 4.78 - 24.20 ng/mL 18.80   Magnesium Latest Ref Range: 1.6 - 2.4 mg/dL 1.9   Vitamin B-12 Latest Ref Range: 211 - 946 pg/mL 666   Protime Latest Ref Range: 11.7 - 14.2 Seconds 12.4   INR Latest Ref Range: 0.90 - 1.10  0.96   WBC Latest Ref Range: 3.40 - 10.80 10*3/mm3 1.73 (C)   RBC Latest Ref Range: 4.14 - 5.80 10*6/mm3 4.39   Hemoglobin Latest Ref Range: 13.0 - 17.7 g/dL 13.4   Hematocrit Latest Ref Range: 37.5 - 51.0 % 39.8   RDW Latest Ref Range: 12.3 - 15.4 % 13.5   MCV Latest Ref Range: 79.0 - 97.0 fL 90.7   MCH Latest Ref Range: 26.6 - 33.0 pg 30.5   MCHC Latest Ref Range: 31.5 - 35.7 g/dL 33.7   MPV Latest Ref Range: 6.0 - 12.0 fL  11.0   Platelets Latest Ref Range: 140 - 450 10*3/mm3 72 (L)   RDW-SD Latest Ref Range: 37.0 - 54.0 fl 45.2   Neutrophil Rel % Latest Ref Range: 42.7 - 76.0 % 59.0   Lymphocyte Rel % Latest Ref Range: 19.6 - 45.3 % 34.0   Atypical Lymphocyte % Latest Ref Range: 0.0 - 5.0 % 1.0   Monocyte Rel % Latest Ref Range: 5.0 - 12.0 % 6.0   Neutrophils Absolute Latest Ref Range: 1.70 - 7.00 10*3/mm3 1.02 (L)   Lymphocytes Absolute Latest Ref Range: 0.70 - 3.10 10*3/mm3 0.59 (L)   Monocytes Absolute Latest Ref Range: 0.10 - 0.90 10*3/mm3 0.10   WBC Morphology Latest Ref Range: Normal  Normal   RBC morphology Latest Ref Range: Normal  Normal   Platelet Morphology Latest Ref Range: Normal  Normal               Physical Exam       GENERAL:  Well-developed, well-nourished in no acute distress.   NECK:  Supple with good range of motion; no thyromegaly or masses, no JVD.  LYMPHATICS:  No cervical, supraclavicular, axillary or inguinal adenopathy.  CHEST:  Normal respiratory effort  CARDIAC:  Regular rate   ABDOMEN:  Soft, nontender with no hepatosplenomegaly or masses.  EXTREMITIES:  No clubbing, cyanosis or edema.  NEUROLOGICAL:  Cranial Nerves II-XII grossly intact.  No focal neurological deficits.  PSYCHIATRIC:  Normal affect and mood. Asleep.        Lab Results   Component Value Date    WBC 1.85 (C) 07/04/2019    HGB 13.7 07/04/2019    HCT 40.4 07/04/2019    MCV 89.0 07/04/2019    PLT 81 (L) 07/04/2019                  Assessment/Plan         1.  Fever with neutropenia and thrombocytopenia currently being evaluated by infectious disease -CSF studies unremarkable. CMV IgG/IgM negative.  Awaiting quantitative CMV.  EBV serologies pending.  HIV pending.  Acute hepatitis panel negative.    Total WBC and ANC are improving. Platelet count stable. We will continue to monitor. If his counts trending down  we may need to consider bone marrow aspiration and biopsy but at this point there is suspicion for infectious cause.     Patient has  improved counts.  He is being discharged.  Will follow him once a week with for CBC check, with nurse review in 2 weeks with nurse practitioner in 4 weeks with me.    Elise Johnson MD

## 2019-07-05 ENCOUNTER — TELEPHONE (OUTPATIENT)
Dept: ONCOLOGY | Facility: CLINIC | Age: 69
End: 2019-07-05

## 2019-07-05 ENCOUNTER — READMISSION MANAGEMENT (OUTPATIENT)
Dept: CALL CENTER | Facility: HOSPITAL | Age: 69
End: 2019-07-05

## 2019-07-05 LAB
BACTERIA SPEC AEROBE CULT: NORMAL
CMV DNA SERPL NAA+PROBE-ACNC: NEGATIVE IU/ML
GRAM STN SPEC: NORMAL
LOG 10 CMV QN DNA PI: NORMAL LOG10 IU/ML

## 2019-07-05 NOTE — TELEPHONE ENCOUNTER
----- Message from Elise Johnson MD sent at 7/4/2019  2:57 PM EDT -----  Patient needs follow-up weekly for CBC check with nurse review, follow-up with nurse practitioner in 2 weeks and with me in 4 weeks.    Elise Johnson MD

## 2019-07-05 NOTE — OUTREACH NOTE
Prep Survey      Responses   Facility patient discharged from?  [place]   Is patient eligible?  Yes   Discharge diagnosis  fever of unknown origin, Leukopenia   Does the patient have one of the following disease processes/diagnoses(primary or secondary)?  Other   Does the patient have Home health ordered?  No   Is there a DME ordered?  No   Prep survey completed?  Yes          Moriah Geronimo RN

## 2019-07-06 LAB
BACTERIA SPEC AEROBE CULT: NORMAL
BACTERIA SPEC AEROBE CULT: NORMAL

## 2019-07-08 ENCOUNTER — READMISSION MANAGEMENT (OUTPATIENT)
Dept: CALL CENTER | Facility: HOSPITAL | Age: 69
End: 2019-07-08

## 2019-07-08 ENCOUNTER — TELEPHONE (OUTPATIENT)
Dept: INTERNAL MEDICINE | Facility: CLINIC | Age: 69
End: 2019-07-08

## 2019-07-08 NOTE — TELEPHONE ENCOUNTER
Jenna, will you please schedule for hospital follow up with Pati?  I called him and the phone was disconnected.   Thank you!

## 2019-07-08 NOTE — OUTREACH NOTE
Medical Week 1 Survey      Responses   Facility patient discharged from?  [place]   Does the patient have one of the following disease processes/diagnoses(primary or secondary)?  Other   Is there a successful TCM telephone encounter documented?  No   Week 1 attempt successful?  No   Unsuccessful attempts  Attempt 1          Miranda Weber RN

## 2019-07-08 NOTE — TELEPHONE ENCOUNTER
----- Message from DONNIE Andrade sent at 7/8/2019  9:28 AM EDT -----  Please call pt and schedule hospital f/u with me sometime this week. They may be slightly upset having to see an NP but you can let them know I have personally spoke to Dr. Hurley about his case and he would like to f/u with pt however he has no time on his schedule.

## 2019-07-09 ENCOUNTER — READMISSION MANAGEMENT (OUTPATIENT)
Dept: CALL CENTER | Facility: HOSPITAL | Age: 69
End: 2019-07-09

## 2019-07-09 NOTE — OUTREACH NOTE
Medical Week 1 Survey      Responses   Facility patient discharged from?  [place]   Does the patient have one of the following disease processes/diagnoses(primary or secondary)?  Other   Is there a successful TCM telephone encounter documented?  No   Week 1 attempt successful?  No   Unsuccessful attempts  Attempt 2          Lita Rider RN

## 2019-07-09 NOTE — PAYOR COMM NOTE
"Teresa Montenegro (68 y.o. Male)                     ATTENTION;    DC SUMMARY CASE REF # [id number] FOR REVIEW.         Date of Birth Social Security Number Address Home Phone MRN    1950  [id number] [id number]  [id number]    [employer] Marital Status          Christianity        Admission Date Admission Type Admitting Provider Attending Provider Department, Room/Bed    7/1/19 Emergency Rosa Rosa MD  [address], [address]/1    Discharge Date Discharge Disposition Discharge Destination        7/4/2019 Home or Self Care              Attending Provider:  (none)   Allergies:  No Known Allergies    Isolation:  None   Infection:  None   Code Status:  Prior    Ht:  180.3 cm (71\")   Wt:  87.7 kg (193 lb 4.8 oz)    Admission Cmt:  None   Principal Problem:  Fever and chills [R50.9]                 Active Insurance as of 7/1/2019     Primary Coverage     Payor Plan Insurance Group Employer/Plan Group    AETNA MEDICARE REPLACEMENT AETNA [id number]     Payor Plan Address Payor Plan Phone Number Payor Plan Fax Number Effective Dates    [address] [phone]  1/1/2019 - None Entered    [facility] [id number]       Subscriber Name Subscriber Birth Date Member ID       TERESA MONTENEGRO 1950 [id number]                 Emergency Contacts      (Rel.) Home Phone Work Phone Mobile Phone    Yecenia Montenegro (Spouse) [phone] -- [phone]               Discharge Summary      Jomar Vieira MD at 7/4/2019  1:36 PM            Date of Admission: 7/1/2019  Date of Discharge:  7/4/2019  Primary Care Physician: Jimbo Hurley MD     Discharge Diagnosis:  Active Hospital Problems    Diagnosis  POA   • **Fever and chills [R50.9]  Yes   • Leukopenia [D72.819]  Unknown   • Thrombocytopenia (CMS/HCC) [D69.6]  Unknown   • Raynaud's phenomenon without gangrene [I73.00]  Unknown   • Benign essential HTN [I10]  Yes   • Cervical spinal stenosis [M48.02]  " Yes   • DDD (degenerative disc disease), cervical [M50.30]  Yes      Resolved Hospital Problems   No resolved problems to display.       DETAILS OF HOSPITAL STAY     Pertinent Test Results and Procedures Performed  Chest x-ray showed no acute infiltrate, consolidation, or effusion    CT angiogram of the chest:  1. The pulmonary arteries are well-opacified and there is no evidence of  pulmonary embolus. The thoracic aorta shows no evidence of aneurysm or  dissection.  2. The lungs are well-expanded and clear. There is no mediastinal or  hilar or axillary adenopathy. There is no significant pericardial  effusion.  3. The liver, spleen, pancreas, both adrenal glands, and both kidneys  are unremarkable. The gallbladder shows no gallstones or wall  thickening.  4. There is no aortic aneurysm or retroperitoneal lymphadenopathy. The  large and small bowel loops are normal in caliber and show no  inflammatory change. In the pelvis there is slight enlargement of the  prostate measuring 5.7 cm. The pelvis is otherwise unremarkable.  5. There are very severe degenerative changes throughout the lumbar  spine with slight upper lumbar scoliosis convex to the right.    Head CT shows no acute intracranial abnormality    The patient underwent lumbar puncture on 7/2/2019 with no evidence of meningitis    Hospital Course  This is a 68-year-old male who presented to the emergency room with complaints of fever, episodic confusion, and progressive weakness.  Please see H&P for full details of admission.  The etiology of his febrile illness was unclear upon presentation.  He was taking doxycycline and as an outpatient and this was continued upon admission.  Infectious disease was consulted.  The patient had extensive work-up including viral studies looking for CMV, EBV, general respiratory viruses as well as work-up for tickborne illness including early ketosis.  All this was negative for acute infection.  He had a lumbar puncture that  did not show any evidence of meningitis.  The other consideration was an acute leukemia given his leukopenia and thrombocytopenia.  He was evaluated by hematology/oncology who felt that an infectious etiology was much more likely.  His cell counts are starting to improve and at this time it is not recommended that he undergo bone marrow biopsy.  Today infectious disease feels that his illness represents an unidentified virus but, unfortunately, we are at the end of our diagnostic abilities for such.  Infectious disease recommends he remain on doxycycline through 7/8/2019.  The patient has shown gradual improvement with supportive care.  His confusion is improved.  He has been left with significant weakness.  He has been working with physical therapy and we will provide him a rolling walker and I will order home health upon his discharge.  Infectious disease has cleared him for discharge.  We will make sure that hematology is okay with this and the patient has been instructed to have a repeat CMP and CBC drawn in 1 week and follow-up with his primary care physician thereafter.  He will follow-up with Dr. Felder of infectious disease in 6 weeks and with hematology per their recommendations.  I discussed this with the patient and his wife today.  He would very much like to go ahead and be discharged so he can recuperate at home given that we will not be running any additional diagnostic testing at this time.    Physical Exam at Discharge:  General: No acute distress, AAOx3, weak and deconditioned appearing  HEENT: EOMI, PERRL  Cardiovascular: +s1 and s2, RRR  Lungs: No rhonchi or wheezing  Abdomen: soft, nontender    Consults:   Consults     Date and Time Order Name Status Description    7/2/2019 1149 Hematology & Oncology Inpatient Consult      7/1/2019 2315 Inpatient Infectious Diseases Consult Completed     7/1/2019 1816 [facility] (on-call MD unless specified) Details Completed     7/1/2019 9840 Family Medicine Consult  Completed             Condition on Discharge: Stable, improved    Discharge Disposition  Home or Self Care    Discharge Medications     Discharge Medications      New Medications      Instructions Start Date   doxycycline 100 MG capsule  Commonly known as:  MONODOX  Replaces:  doxycycline 100 MG tablet   100 mg, Oral, Every 12 Hours Scheduled         Changes to Medications      Instructions Start Date   levothyroxine 125 MCG tablet  Commonly known as:  SYNTHROID, LEVOTHROID  What changed:    · medication strength  · how much to take   125 mcg, Oral, Daily         Continue These Medications      Instructions Start Date   atorvastatin 20 MG tablet  Commonly known as:  LIPITOR   20 mg, Oral, Daily      HYDROcodone-acetaminophen 5-325 MG per tablet  Commonly known as:  NORCO   1 tablet, Oral, Every 6 Hours PRN         Stop These Medications    doxycycline 100 MG tablet  Commonly known as:  ADOXA  Replaced by:  doxycycline 100 MG capsule     gabapentin 300 MG capsule  Commonly known as:  NEURONTIN     irbesartan 300 MG tablet  Commonly known as:  AVAPRO     ondansetron 4 MG tablet  Commonly known as:  ZOFRAN     sildenafil 100 MG tablet  Commonly known as:  VIAGRA            Discharge Diet:   Diet Instructions     Diet: Regular; Thin      Discharge Diet:  Regular    Fluid Consistency:  Thin          Activity at Discharge:   Activity Instructions     Activity as Tolerated            Follow-up Appointments  No future appointments.  Additional Instructions for the Follow-ups that You Need to Schedule     Discharge Follow-up with PCP   As directed       Currently Documented PCP:    Jimbo Hurley MD    PCP Phone Number:    [phone]     Follow Up Details:  1-2 weeks         Discharge Follow-up with Specified Provider: Dr. Felder in 6 weeks   As directed      To:  Dr. Felder in 6 weeks         Discharge Follow-up with Specified Provider: Hematology per their recommendations   As directed      To:  Hematology per  their recommendations         CBC & Differential    Jul 11, 2019 (Approximate)      Manual Differential:  No         Comprehensive Metabolic Panel    Jul 11, 2019 (Approximate)            Test Results Pending at Discharge   Order Current Status    CMV DNA, Quantitative, PCR In process    Non-gynecologic Cytology In process    Blood Culture - Blood, Arm, Left Preliminary result    Blood Culture - Blood, Arm, Left Preliminary result    Culture, CSF - Cerebrospinal Fluid, Lumbar Puncture Preliminary result          I have examined and discussed discharge planning with the patient today.     Jomar Vieira MD  07/04/19  1:36 PM    Time: Discharge greater than 30 min            Electronically signed by Jomar Vieira MD at 7/4/2019  1:42 PM

## 2019-07-10 LAB
CYTO UR: NORMAL
LAB AP CASE REPORT: NORMAL
LAB AP DIAGNOSIS COMMENT: NORMAL
PATH REPORT.ADDENDUM SPEC: NORMAL
PATH REPORT.FINAL DX SPEC: NORMAL
PATH REPORT.GROSS SPEC: NORMAL

## 2019-07-12 ENCOUNTER — LAB (OUTPATIENT)
Dept: LAB | Facility: HOSPITAL | Age: 69
End: 2019-07-12

## 2019-07-12 ENCOUNTER — OFFICE VISIT (OUTPATIENT)
Dept: ONCOLOGY | Facility: CLINIC | Age: 69
End: 2019-07-12

## 2019-07-12 ENCOUNTER — READMISSION MANAGEMENT (OUTPATIENT)
Dept: CALL CENTER | Facility: HOSPITAL | Age: 69
End: 2019-07-12

## 2019-07-12 VITALS
BODY MASS INDEX: 27.13 KG/M2 | HEIGHT: 69 IN | TEMPERATURE: 98.4 F | DIASTOLIC BLOOD PRESSURE: 88 MMHG | RESPIRATION RATE: 16 BRPM | WEIGHT: 183.2 LBS | OXYGEN SATURATION: 98 % | HEART RATE: 60 BPM | SYSTOLIC BLOOD PRESSURE: 129 MMHG

## 2019-07-12 DIAGNOSIS — D70.8 OTHER NEUTROPENIA (HCC): Primary | ICD-10-CM

## 2019-07-12 DIAGNOSIS — D69.6 THROMBOCYTOPENIA (HCC): ICD-10-CM

## 2019-07-12 DIAGNOSIS — D69.6 THROMBOCYTOPENIA (HCC): Primary | ICD-10-CM

## 2019-07-12 LAB
BASOPHILS # BLD AUTO: 0.04 10*3/MM3 (ref 0–0.2)
BASOPHILS NFR BLD AUTO: 0.7 % (ref 0–1.5)
DEPRECATED RDW RBC AUTO: 43.7 FL (ref 37–54)
EOSINOPHIL # BLD AUTO: 0.07 10*3/MM3 (ref 0–0.4)
EOSINOPHIL NFR BLD AUTO: 1.2 % (ref 0.3–6.2)
ERYTHROCYTE [DISTWIDTH] IN BLOOD BY AUTOMATED COUNT: 13.4 % (ref 12.3–15.4)
HCT VFR BLD AUTO: 44.4 % (ref 37.5–51)
HGB BLD-MCNC: 15.6 G/DL (ref 13–17.7)
IMM GRANULOCYTES # BLD AUTO: 0.09 10*3/MM3 (ref 0–0.05)
IMM GRANULOCYTES NFR BLD AUTO: 1.6 % (ref 0–0.5)
LYMPHOCYTES # BLD AUTO: 1.82 10*3/MM3 (ref 0.7–3.1)
LYMPHOCYTES NFR BLD AUTO: 31.7 % (ref 19.6–45.3)
MCH RBC QN AUTO: 31.3 PG (ref 26.6–33)
MCHC RBC AUTO-ENTMCNC: 35.1 G/DL (ref 31.5–35.7)
MCV RBC AUTO: 89.2 FL (ref 79–97)
MONOCYTES # BLD AUTO: 0.79 10*3/MM3 (ref 0.1–0.9)
MONOCYTES NFR BLD AUTO: 13.7 % (ref 5–12)
NEUTROPHILS # BLD AUTO: 2.94 10*3/MM3 (ref 1.7–7)
NEUTROPHILS NFR BLD AUTO: 51.1 % (ref 42.7–76)
NRBC BLD AUTO-RTO: 0 /100 WBC (ref 0–0.2)
PLATELET # BLD AUTO: 231 10*3/MM3 (ref 140–450)
PMV BLD AUTO: 10.6 FL (ref 6–12)
RBC # BLD AUTO: 4.98 10*6/MM3 (ref 4.14–5.8)
WBC NRBC COR # BLD: 5.75 10*3/MM3 (ref 3.4–10.8)

## 2019-07-12 PROCEDURE — 99213 OFFICE O/P EST LOW 20 MIN: CPT | Performed by: NURSE PRACTITIONER

## 2019-07-12 PROCEDURE — 85025 COMPLETE CBC W/AUTO DIFF WBC: CPT | Performed by: NURSE PRACTITIONER

## 2019-07-12 PROCEDURE — 36415 COLL VENOUS BLD VENIPUNCTURE: CPT | Performed by: NURSE PRACTITIONER

## 2019-07-12 RX ORDER — DOXYCYCLINE HYCLATE 100 MG/1
100 CAPSULE ORAL 2 TIMES DAILY
COMMUNITY
End: 2019-07-15

## 2019-07-12 NOTE — OUTREACH NOTE
Medical Week 2 Survey      Responses   Facility patient discharged from?  [facility]   Does the patient have one of the following disease processes/diagnoses(primary or secondary)?  Other   Week 2 attempt successful?  Yes   Call start time  1506   Discharge diagnosis  fever of unknown origin, Leukopenia   Call end time  1510   Meds reviewed with patient/caregiver?  Yes   Is the patient having any side effects they believe may be caused by any medication additions or changes?  No   Does the patient have all medications ordered at discharge?  Yes   Is the patient taking all medications as directed (includes completed medication regime)?  Yes   Does the patient have a primary care provider?   Yes   Does the patient have an appointment with their PCP within 7 days of discharge?  Yes   Has the patient kept scheduled appointments due by today?  Yes   Has home health visited the patient within 72 hours of discharge?  N/A   Psychosocial issues?  No   Did the patient receive a copy of their discharge instructions?  Yes   Nursing interventions  Reviewed instructions with patient   What is the patient's perception of their health status since discharge?  Improving   Is the patient/caregiver able to teach back signs and symptoms related to disease process for when to call PCP?  Yes   Is the patient/caregiver able to teach back signs and symptoms related to disease process for when to call 911?  Yes   Is the patient/caregiver able to teach back the hierarchy of who to call/visit for symptoms/problems? PCP, Specialist, Home health nurse, Urgent Care, ED, 911  Yes   Week 2 Call Completed?  Yes          Jamie Bell RN

## 2019-07-12 NOTE — PROGRESS NOTES
Subjective .     REASONS FOR FOLLOWUP: Leukopenia and thrombocytopenia thought to be due to viral illness.    HISTORY OF PRESENT ILLNESS:  The patient is a 68 y.o. year old male who is here for follow-up with the above-mentioned history.    History of Present Illness   patient is here today for hospital follow-up.  He initially presented to the emergency room with complaints of fever, progressive weakness, and episodic confusion.  He was found to be thrombocytopenic and neutropenic, thus we were consulted to see the patient.  The patient reports that he is feeling better.  He continues to slowly improve.  He denies any further fevers or chills.  He does have some mild fatigue.  He also has some mild generalized myalgias/arthralgias.  He is no longer taking antibiotics.  The patient is quite puzzled about his recent hospitalization and illness.    He is scheduled to return for follow-up visit with infectious disease, Dr. Felder, in about 5 weeks.     Past Medical History:   Diagnosis Date   • Allergic rhinitis    • Arthritis    • Cancer (CMS/HCC)     skin   • Cervical spondylosis with myelopathy    • Elevated cholesterol    • FHx: colonic polyps    • GERD (gastroesophageal reflux disease)    • History of colonic polyps    • History of kidney stones    • Hyperlipidemia    • Hypertension     Benign Essential   • Hypothyroidism    • Insomnia    • Malaise and fatigue    • Pure hypercholesterolemia        ONCOLOGIC HISTORY:  (History from previous dates can be found in the separate document.)  History of Present Illness patient is a 68-year-old gentleman who was admitted.  He has [place]st[place] of Raynaud's phenomena.  Patient states that he had a thick attached to him on June 17, 2019 which he pulled.  Couple of days later he started having symptoms of fatigue and weakness and worsening low back pain.  This improved a few days later and he started having a fever.  The temperature was 100.3 with chills and felt bad.  He saw  his primary care physician on June 27 who started him on doxycycline.  The next day he went to the primary care and had a CBC which showed a WBC count of 2000.  Further work-up showed CARMEL and double-stranded DNA were done.  He presented to the emergency room on June 30 which of starting stabbing left-sided chest pains with a temperature of 101.8.  He did travel to [place] and come back prior to being bitten by the tech.  Repeat work-up in the emergency room was done.  Diagnosis of tickborne illness was bait and serology for ehrlichiosis has been done.  He was placed on doxycycline and discharged but then he came back he became more confused and sleepy and sluggish.  The chest pain had resolved.     Looking at his blood counts is becoming more pancytopenic since June 27, 2019.  His hemoglobin was 15.2 and it stayed stable at 15.  But his white count was 2.79 it dropped down to 1.27 and his platelet dropped from 715-75.  His chest x-ray was negative.  CT head was negative.  There was concern that there is reactivation of CMV and Rudy-Barr viral infection.     And underwent CT angiogram of the chest.  The pulmonary arteries well-developed opacified no pulmonary embolism.  The lungs were clear without any adenopathy.  CT abdomen pelvis was negative.  There is mild enlargement of the prostate 5.7 cm.     Infectious disease was concerned that leukopenia and thrombocytopenia were concerning for leukemia.  We were consulted because of that     Underwent CSF analysis.  Flow was sent on the CSF and cytology was sent pending.  CSF protein is 65.,  CSF had 197 RBCs, 7 glucose is 60.  Ferritin 528.  HIV pending, Rudy-Barr virus pending and CMV pending, hepatitis profile nonreactive respiratory viral panel negative blood culture negative, lactic acid 0.02        CT head negative.  CBC 7 months ago was normal.  CARMEL negative.  Only recently her CBC is abnormal since this admission        Current Outpatient Medications on File  Prior to Visit   Medication Sig Dispense Refill   • atorvastatin (LIPITOR) 20 MG tablet Take 20 mg by mouth Daily.     • doxycycline (VIBRAMYCIN) 100 MG capsule Take 100 mg by mouth 2 (Two) Times a Day.     • levothyroxine (SYNTHROID, LEVOTHROID) 125 MCG tablet Take 1 tablet by mouth Daily for 30 days. 30 tablet 0   • HYDROcodone-acetaminophen (NORCO) 5-325 MG per tablet Take 1 tablet by mouth Every 6 (Six) Hours As Needed for Moderate Pain . 8 tablet 0     No current facility-administered medications on file prior to visit.        ALLERGIES:   No Known Allergies    Social History     Socioeconomic History   • Marital status:      Spouse name: Yecenia   • Number of children: 2   • Years of education: POST GRAD   • Highest education level: Not on file   Occupational History   • Occupation: RETIRED   Tobacco Use   • Smoking status: Never Smoker   • Smokeless tobacco: Never Used   Substance and Sexual Activity   • Alcohol use: Yes     Alcohol/week: 0.6 oz     Types: 1 Shots of liquor per week     Comment: 3-5 times a week   • Drug use: Defer   • Sexual activity: Defer   Social History Narrative    LIVES WITH SPOUSE         Cancer-related family history includes Breast cancer in his sister; Cancer in his maternal uncle and sister; Liver cancer in his sister; Prostate cancer in his brother.     Review of Systems   Constitutional: Positive for fatigue. Negative for activity change, appetite change, chills and fever.   HENT: Negative for mouth sores, nosebleeds and trouble swallowing.    Respiratory: Negative for cough and shortness of breath.    Cardiovascular: Negative for chest pain and leg swelling.   Gastrointestinal: Negative for abdominal pain, constipation, diarrhea, nausea and vomiting.   Genitourinary: Negative for difficulty urinating.   Skin: Negative for rash.   Neurological: Negative for dizziness, weakness and numbness.   Hematological: Negative for adenopathy. Does not bruise/bleed easily.  "  Psychiatric/Behavioral: Negative for sleep disturbance.     Objective      Vitals:    07/12/19 0937   BP: 129/88   Pulse: 60   Resp: 16   Temp: 98.4 °F (36.9 °C)   TempSrc: Oral   SpO2: 98%   Weight: 83.1 kg (183 lb 3.2 oz)   Height: 174.5 cm (68.7\")  Comment: new height   PainSc: 0-No pain     Current Status 7/12/2019   ECOG score 1       Physical Exam   Constitutional: He is oriented to person, place, and time. He appears well-developed and well-nourished. No distress.   HENT:   Head: Normocephalic and atraumatic.   Mouth/Throat: Oropharynx is clear and moist and mucous membranes are normal. No oropharyngeal exudate.   Eyes: Pupils are equal, round, and reactive to light.   Neck: Normal range of motion.   Cardiovascular: Normal rate, regular rhythm and normal heart sounds.   No murmur heard.  Pulmonary/Chest: Effort normal and breath sounds normal. No respiratory distress. He has no wheezes. He has no rhonchi. He has no rales.   Abdominal: Soft. Normal appearance and bowel sounds are normal. He exhibits no distension. There is no hepatosplenomegaly.   Musculoskeletal: Normal range of motion. He exhibits no edema.   Neurological: He is alert and oriented to person, place, and time.   Skin: Skin is warm and dry. No rash noted.   Psychiatric: He has a normal mood and affect.   Vitals reviewed.        RECENT LABS:  Hematology WBC   Date Value Ref Range Status   07/12/2019 5.75 3.40 - 10.80 10*3/mm3 Final   11/06/2018 5.09 4.50 - 10.70 10*3/mm3 Final     RBC   Date Value Ref Range Status   07/12/2019 4.98 4.14 - 5.80 10*6/mm3 Final   11/06/2018 4.86 4.60 - 6.00 10*6/mm3 Final     Hemoglobin   Date Value Ref Range Status   07/12/2019 15.6 13.0 - 17.7 g/dL Final     Hematocrit   Date Value Ref Range Status   07/12/2019 44.4 37.5 - 51.0 % Final     Platelets   Date Value Ref Range Status   07/12/2019 231 140 - 450 10*3/mm3 Final        Assessment/Plan     1.  Fever with neutropenia and thrombocytopenia: Being followed " by infectious disease, Dr. Nelson.  CSF studies unremarkable.  CMV, EBV,  IgG, IgM, work-up for tickborne illness, all negative.  Acute hepatitis panel negative.    Fortunately, today his white blood cell and platelet count has normalized.  He is overall feeling better.  I reviewed with Dr. Johnson, and she feels like his symptoms are all related to a viral illness.  We will continue to closely monitor his counts over the next few weeks.      PLAN:  1. Return in 1 week for follow-up visit with nurse practitioner with repeat CBC.  2. Return in 2 weeks with  CBC with RN review.  3. Patient return for follow-up visit with Dr. Johnson in about 3 weeks again with repeat CBC.  4. Follow-up with Dr. Felder as scheduled.

## 2019-07-15 ENCOUNTER — OFFICE VISIT (OUTPATIENT)
Dept: INTERNAL MEDICINE | Facility: CLINIC | Age: 69
End: 2019-07-15

## 2019-07-15 VITALS
OXYGEN SATURATION: 99 % | SYSTOLIC BLOOD PRESSURE: 122 MMHG | BODY MASS INDEX: 27.25 KG/M2 | DIASTOLIC BLOOD PRESSURE: 80 MMHG | WEIGHT: 184 LBS | HEART RATE: 69 BPM | HEIGHT: 69 IN

## 2019-07-15 DIAGNOSIS — D72.819 LEUKOPENIA, UNSPECIFIED TYPE: Primary | ICD-10-CM

## 2019-07-15 DIAGNOSIS — R11.0 NAUSEA: ICD-10-CM

## 2019-07-15 DIAGNOSIS — R74.8 ELEVATED LIVER ENZYMES: ICD-10-CM

## 2019-07-15 PROCEDURE — 99214 OFFICE O/P EST MOD 30 MIN: CPT | Performed by: NURSE PRACTITIONER

## 2019-07-16 LAB
ALBUMIN SERPL-MCNC: 4.1 G/DL (ref 3.5–5.2)
ALBUMIN/GLOB SERPL: 2.1 G/DL
ALP SERPL-CCNC: 71 U/L (ref 39–117)
ALT SERPL-CCNC: 47 U/L (ref 1–41)
AST SERPL-CCNC: 21 U/L (ref 1–40)
BILIRUB SERPL-MCNC: 0.4 MG/DL (ref 0.2–1.2)
BUN SERPL-MCNC: 19 MG/DL (ref 8–23)
BUN/CREAT SERPL: 16.8 (ref 7–25)
CALCIUM SERPL-MCNC: 8.8 MG/DL (ref 8.6–10.5)
CHLORIDE SERPL-SCNC: 103 MMOL/L (ref 98–107)
CO2 SERPL-SCNC: 28.2 MMOL/L (ref 22–29)
CREAT SERPL-MCNC: 1.13 MG/DL (ref 0.76–1.27)
GLOBULIN SER CALC-MCNC: 2 GM/DL
GLUCOSE SERPL-MCNC: 136 MG/DL (ref 65–99)
POTASSIUM SERPL-SCNC: 4.4 MMOL/L (ref 3.5–5.2)
PROT SERPL-MCNC: 6.1 G/DL (ref 6–8.5)
SODIUM SERPL-SCNC: 141 MMOL/L (ref 136–145)

## 2019-07-16 NOTE — PROGRESS NOTES
Subjective   Anoop Montenegro is a 68 y.o. male who presents for f/u regarding recent hospitalization for fever & chills and leukopenia due to an unknown origin.    He was hospitalized 7/1 for fever and chills with leukocytosis and mental status changes. His labs showed an abrupt leukopenia which dropped to 1.13 on 7/2 and has steadily increased, recheck CBC on 7/12 was 5.75.  He reports feeling better since hospitalization with resolution of chest pain and abdominal pain. Fever has resolved. He completed his course of Doxycycline yesterday.  He continues to c/o nausea with increased fatigue. His Atorvastatin has been on hold as well.         The following portions of the patient's history were reviewed and updated as appropriate: allergies, current medications, past social history and problem list.    Past Medical History:   Diagnosis Date   • Allergic rhinitis    • Arthritis    • Cancer (CMS/HCC)     skin   • Cervical spondylosis with myelopathy    • Elevated cholesterol    • FHx: colonic polyps    • GERD (gastroesophageal reflux disease)    • History of colonic polyps    • History of kidney stones    • Hyperlipidemia    • Hypertension     Benign Essential   • Hypothyroidism    • Insomnia    • Malaise and fatigue    • Pure hypercholesterolemia          Current Outpatient Medications:   •  atorvastatin (LIPITOR) 20 MG tablet, Take 20 mg by mouth Daily., Disp: , Rfl:   •  HYDROcodone-acetaminophen (NORCO) 5-325 MG per tablet, Take 1 tablet by mouth Every 6 (Six) Hours As Needed for Moderate Pain ., Disp: 8 tablet, Rfl: 0  •  levothyroxine (SYNTHROID, LEVOTHROID) 125 MCG tablet, Take 1 tablet by mouth Daily for 30 days., Disp: 30 tablet, Rfl: 0    No Known Allergies    Review of Systems   Constitutional: Positive for fatigue. Negative for chills, fever and unexpected weight change.   HENT: Negative for congestion, ear pain, postnasal drip, sinus pressure, sore throat and trouble swallowing.    Eyes: Negative for visual  "disturbance.   Respiratory: Negative for cough, chest tightness and wheezing.    Cardiovascular: Negative for chest pain, palpitations and leg swelling.   Gastrointestinal: Positive for nausea. Negative for abdominal pain, blood in stool and vomiting.   Genitourinary: Negative for dysuria, frequency and urgency.   Musculoskeletal: Negative for arthralgias and joint swelling.   Skin: Negative for color change.   Neurological: Negative for syncope, weakness and headaches.   Hematological: Does not bruise/bleed easily.       Objective   Vitals:    07/15/19 1326   BP: 122/80   BP Location: Left arm   Patient Position: Sitting   Cuff Size: Adult   Pulse: 69   SpO2: 99%   Weight: 83.5 kg (184 lb)   Height: 174.5 cm (68.7\")     Physical Exam   Constitutional: He appears well-developed and well-nourished. He is cooperative. He does not have a sickly appearance. He does not appear ill.   HENT:   Head: Normocephalic.   Right Ear: Hearing, tympanic membrane and external ear normal.   Left Ear: Hearing, tympanic membrane and external ear normal.   Nose: Nose normal. No mucosal edema, rhinorrhea, sinus tenderness or nasal deformity. Right sinus exhibits no maxillary sinus tenderness and no frontal sinus tenderness. Left sinus exhibits no maxillary sinus tenderness and no frontal sinus tenderness.   Mouth/Throat: Oropharynx is clear and moist and mucous membranes are normal. Normal dentition.   Eyes: Conjunctivae and lids are normal. Pupils are equal, round, and reactive to light. Right eye exhibits no discharge and no exudate. Left eye exhibits no discharge and no exudate.   Neck: Trachea normal and normal range of motion. Carotid bruit is not present. No edema present. No thyroid mass and no thyromegaly present.   Cardiovascular: Regular rhythm, normal heart sounds and normal pulses.   No murmur heard.  Pulmonary/Chest: Breath sounds normal. No respiratory distress. He has no decreased breath sounds. He has no wheezes. He has " no rhonchi. He has no rales.   Lymphadenopathy:        Head (right side): No submental, no submandibular, no tonsillar, no preauricular, no posterior auricular and no occipital adenopathy present.        Head (left side): No submental, no submandibular, no tonsillar, no preauricular, no posterior auricular and no occipital adenopathy present.   Neurological: He is alert.   Skin: Skin is warm, dry and intact. No cyanosis. Nails show no clubbing.       Assessment/Plan   Anoop was seen today for follow-up.    Diagnoses and all orders for this visit:    Leukopenia, unspecified type  Comments:  leukopenia/neutropenia-resolved    Elevated liver enzymes  -     Comprehensive Metabolic Panel; Future  -     Comprehensive Metabolic Panel    Nausea    Reviewed labs from ER and hospitalization, recent CBC (7/12) shows improvement in WBC of 5.75. His RMSF, IgG was slightly increased (<1:64), he has completed course of Doxycycline and has a f/u appt with Dr. Felder for repeat titers. However, he states the tick was on him for a couple of hours so doubtful infection is tickborne.  His symptoms are steadily improving (c/o nausea with increased fatigue), will recheck liver enzymes today.    Current outpatient and discharge medications have been reconciled for the patient.  Reviewed by: DONNIE Alex

## 2019-07-19 ENCOUNTER — READMISSION MANAGEMENT (OUTPATIENT)
Dept: CALL CENTER | Facility: HOSPITAL | Age: 69
End: 2019-07-19

## 2019-07-19 ENCOUNTER — APPOINTMENT (OUTPATIENT)
Dept: LAB | Facility: HOSPITAL | Age: 69
End: 2019-07-19

## 2019-07-19 ENCOUNTER — OFFICE VISIT (OUTPATIENT)
Dept: ONCOLOGY | Facility: CLINIC | Age: 69
End: 2019-07-19

## 2019-07-19 VITALS
DIASTOLIC BLOOD PRESSURE: 81 MMHG | WEIGHT: 184.4 LBS | HEIGHT: 69 IN | TEMPERATURE: 98.3 F | HEART RATE: 78 BPM | RESPIRATION RATE: 18 BRPM | OXYGEN SATURATION: 98 % | BODY MASS INDEX: 27.31 KG/M2 | SYSTOLIC BLOOD PRESSURE: 125 MMHG

## 2019-07-19 DIAGNOSIS — D69.6 THROMBOCYTOPENIA (HCC): ICD-10-CM

## 2019-07-19 DIAGNOSIS — D69.6 THROMBOCYTOPENIA (HCC): Primary | ICD-10-CM

## 2019-07-19 DIAGNOSIS — D70.3 NEUTROPENIA ASSOCIATED WITH INFECTION (HCC): Primary | ICD-10-CM

## 2019-07-19 LAB
BASOPHILS # BLD AUTO: 0.05 10*3/MM3 (ref 0–0.2)
BASOPHILS NFR BLD AUTO: 0.7 % (ref 0–1.5)
DEPRECATED RDW RBC AUTO: 46.3 FL (ref 37–54)
EOSINOPHIL # BLD AUTO: 0.11 10*3/MM3 (ref 0–0.4)
EOSINOPHIL NFR BLD AUTO: 1.5 % (ref 0.3–6.2)
ERYTHROCYTE [DISTWIDTH] IN BLOOD BY AUTOMATED COUNT: 13.7 % (ref 12.3–15.4)
HCT VFR BLD AUTO: 41.7 % (ref 37.5–51)
HGB BLD-MCNC: 14.2 G/DL (ref 13–17.7)
IMM GRANULOCYTES # BLD AUTO: 0.02 10*3/MM3 (ref 0–0.05)
IMM GRANULOCYTES NFR BLD AUTO: 0.3 % (ref 0–0.5)
LYMPHOCYTES # BLD AUTO: 2.1 10*3/MM3 (ref 0.7–3.1)
LYMPHOCYTES NFR BLD AUTO: 29.6 % (ref 19.6–45.3)
MCH RBC QN AUTO: 31.2 PG (ref 26.6–33)
MCHC RBC AUTO-ENTMCNC: 34.1 G/DL (ref 31.5–35.7)
MCV RBC AUTO: 91.6 FL (ref 79–97)
MONOCYTES # BLD AUTO: 0.78 10*3/MM3 (ref 0.1–0.9)
MONOCYTES NFR BLD AUTO: 11 % (ref 5–12)
NEUTROPHILS # BLD AUTO: 4.04 10*3/MM3 (ref 1.7–7)
NEUTROPHILS NFR BLD AUTO: 56.9 % (ref 42.7–76)
NRBC BLD AUTO-RTO: 0 /100 WBC (ref 0–0.2)
PLATELET # BLD AUTO: 148 10*3/MM3 (ref 140–450)
PMV BLD AUTO: 10.7 FL (ref 6–12)
RBC # BLD AUTO: 4.55 10*6/MM3 (ref 4.14–5.8)
WBC NRBC COR # BLD: 7.1 10*3/MM3 (ref 3.4–10.8)

## 2019-07-19 PROCEDURE — 85025 COMPLETE CBC W/AUTO DIFF WBC: CPT | Performed by: NURSE PRACTITIONER

## 2019-07-19 PROCEDURE — 99213 OFFICE O/P EST LOW 20 MIN: CPT | Performed by: NURSE PRACTITIONER

## 2019-07-19 PROCEDURE — 36416 COLLJ CAPILLARY BLOOD SPEC: CPT | Performed by: NURSE PRACTITIONER

## 2019-07-19 NOTE — PROGRESS NOTES
Subjective .     REASONS FOR FOLLOWUP: Leukopenia and thrombocytopenia thought to be due to viral illness.    HISTORY OF PRESENT ILLNESS:  The patient is a 68 y.o. year old male who is here for follow-up with the above-mentioned history.    History of Present Illness patient returns today for follow-up visit on his blood counts.  His CBC has completely normalized.  He continues to complain of fatigue, but overall he is feeling better.  He denies further fevers or chills.  He has completed doxycycline, and will follow up with Dr. Felder on August 15.    Past Medical History:   Diagnosis Date   • Allergic rhinitis    • Arthritis    • Cancer (CMS/HCC)     skin   • Cervical spondylosis with myelopathy    • Elevated cholesterol    • FHx: colonic polyps    • GERD (gastroesophageal reflux disease)    • History of colonic polyps    • History of kidney stones    • Hyperlipidemia    • Hypertension     Benign Essential   • Hypothyroidism    • Insomnia    • Malaise and fatigue    • Pure hypercholesterolemia        ONCOLOGIC HISTORY:  (History from previous dates can be found in the separate document.)  History of Present Illness patient is a 68-year-old gentleman who was admitted.  He has Istria of Raynaud's phenomena.  Patient states that he had a thick attached to him on June 17, 2019 which he pulled.  Couple of days later he started having symptoms of fatigue and weakness and worsening low back pain.  This improved a few days later and he started having a fever.  The temperature was 100.3 with chills and felt bad.  He saw his primary care physician on June 27 who started him on doxycycline.  The next day he went to the primary care and had a CBC which showed a WBC count of 2000.  Further work-up showed CARMEL and double-stranded DNA were done.  He presented to the emergency room on June 30 which of starting stabbing left-sided chest pains with a temperature of 101.8.  He did travel to [place] and come back prior to being  bitten by the tech.  Repeat work-up in the emergency room was done.  Diagnosis of tickborne illness was bait and serology for ehrlichiosis has been done.  He was placed on doxycycline and discharged but then he came back he became more confused and sleepy and sluggish.  The chest pain had resolved.     Looking at his blood counts is becoming more pancytopenic since June 27, 2019.  His hemoglobin was 15.2 and it stayed stable at 15.  But his white count was 2.79 it dropped down to 1.27 and his platelet dropped from 715-75.  His chest x-ray was negative.  CT head was negative.  There was concern that there is reactivation of CMV and Rudy-Barr viral infection.     And underwent CT angiogram of the chest.  The pulmonary arteries well-developed opacified no pulmonary embolism.  The lungs were clear without any adenopathy.  CT abdomen pelvis was negative.  There is mild enlargement of the prostate 5.7 cm.     Infectious disease was concerned that leukopenia and thrombocytopenia were concerning for leukemia.  We were consulted because of that     Underwent CSF analysis.  Flow was sent on the CSF and cytology was sent pending.  CSF protein is 65.,  CSF had 197 RBCs, 7 glucose is 60.  Ferritin 528.  HIV pending, Rudy-Barr virus pending and CMV pending, hepatitis profile nonreactive respiratory viral panel negative blood culture negative, lactic acid 0.02        CT head negative.  CBC 7 months ago was normal.  CARMEL negative.  Only recently her CBC is abnormal since this admission        Current Outpatient Medications on File Prior to Visit   Medication Sig Dispense Refill   • HYDROcodone-acetaminophen (NORCO) 5-325 MG per tablet Take 1 tablet by mouth Every 6 (Six) Hours As Needed for Moderate Pain . 8 tablet 0   • levothyroxine (SYNTHROID, LEVOTHROID) 125 MCG tablet Take 1 tablet by mouth Daily for 30 days. 30 tablet 0   • atorvastatin (LIPITOR) 20 MG tablet Take 20 mg by mouth Daily.       No current  "facility-administered medications on file prior to visit.        ALLERGIES:   No Known Allergies    Social History     Socioeconomic History   • Marital status:      Spouse name: Yecenia   • Number of children: 2   • Years of education: POST GRAD   • Highest education level: Not on file   Occupational History   • Occupation: RETIRED   Tobacco Use   • Smoking status: Never Smoker   • Smokeless tobacco: Never Used   Substance and Sexual Activity   • Alcohol use: Yes     Alcohol/week: 0.6 oz     Types: 1 Shots of liquor per week     Comment: 3-5 times a week   • Drug use: Defer   • Sexual activity: Defer   Social History Narrative    LIVES WITH SPOUSE         Cancer-related family history includes Breast cancer in his sister; Cancer in his maternal uncle and sister; Liver cancer in his sister; Prostate cancer in his brother.     Review of Systems   Constitutional: Positive for fatigue. Negative for activity change, appetite change, chills and fever.   HENT: Negative for mouth sores, nosebleeds and trouble swallowing.    Respiratory: Negative for cough and shortness of breath.    Cardiovascular: Negative for chest pain and leg swelling.   Gastrointestinal: Negative for abdominal pain, constipation, diarrhea, nausea and vomiting.   Genitourinary: Negative for difficulty urinating.   Skin: Negative for rash.   Neurological: Negative for dizziness, weakness and numbness.   Hematological: Negative for adenopathy. Does not bruise/bleed easily.   Psychiatric/Behavioral: Negative for sleep disturbance.   7/19/2019 review of systems unchanged from above except as updated.    Objective      Vitals:    07/19/19 1518   BP: 125/81   Pulse: 78   Resp: 18   Temp: 98.3 °F (36.8 °C)   SpO2: 98%   Weight: 83.6 kg (184 lb 6.4 oz)   Height: 174.5 cm (68.7\")   PainSc: 0-No pain     Current Status 7/19/2019   ECOG score 0       Physical Exam   Constitutional: He is oriented to person, place, and time. He appears well-developed and " well-nourished. No distress.   HENT:   Head: Normocephalic and atraumatic.   Mouth/Throat: Oropharynx is clear and moist and mucous membranes are normal. No oropharyngeal exudate.   Eyes: Pupils are equal, round, and reactive to light.   Neck: Normal range of motion.   Cardiovascular: Normal rate, regular rhythm and normal heart sounds.   No murmur heard.  Pulmonary/Chest: Effort normal and breath sounds normal. No respiratory distress. He has no wheezes. He has no rhonchi. He has no rales.   Abdominal: Soft. Normal appearance and bowel sounds are normal. He exhibits no distension. There is no hepatosplenomegaly.   Musculoskeletal: Normal range of motion. He exhibits no edema.   Neurological: He is alert and oriented to person, place, and time.   Skin: Skin is warm and dry. No rash noted.   Psychiatric: He has a normal mood and affect.   Vitals reviewed.  7/19/2019 physical exam is unchanged from above.    RECENT LABS:  Hematology WBC   Date Value Ref Range Status   07/19/2019 7.10 3.40 - 10.80 10*3/mm3 Final   11/06/2018 5.09 4.50 - 10.70 10*3/mm3 Final     RBC   Date Value Ref Range Status   07/19/2019 4.55 4.14 - 5.80 10*6/mm3 Final   11/06/2018 4.86 4.60 - 6.00 10*6/mm3 Final     Hemoglobin   Date Value Ref Range Status   07/19/2019 14.2 13.0 - 17.7 g/dL Final     Hematocrit   Date Value Ref Range Status   07/19/2019 41.7 37.5 - 51.0 % Final     Platelets   Date Value Ref Range Status   07/19/2019 148 140 - 450 10*3/mm3 Final        Assessment/Plan     1.  Fever with neutropenia and thrombocytopenia: Being followed by infectious disease, Dr. Nelson.  CSF studies unremarkable.  CMV, EBV,  IgG, IgM, work-up for tickborne illness, all negative.  Acute hepatitis panel negative.    Fortunately, today his white blood cell and platelet count has normalized.  He is overall feeling better.  I reviewed with Dr. Johnson, and she feels like his symptoms are all related to a viral illness.  We will continue to closely  monitor his counts over the next few weeks.    Patient returns 7/19/2019 for follow-up and his CBC has normalized.  He complains of persistent fatigue, and I encouraged him to gradually increase his activity level as well as make sure that he is drinking adequately.  He will return for follow-up visit in 2 weeks with Dr. Johnson, sooner should he develop a new concern or problem.      PLAN:  1. Return in 2 weeks for follow-up visit with Dr. Johnson with repeat CBC at that time.  2. Follow-up with Dr. Felder as scheduled.

## 2019-07-19 NOTE — OUTREACH NOTE
Medical Week 3 Survey      Responses   Facility patient discharged from?  [facility]   Does the patient have one of the following disease processes/diagnoses(primary or secondary)?  Other   Week 3 attempt successful?  Yes   Call start time  1436   Call end time  1437   Discharge diagnosis  fever of unknown origin, Leukopenia   Meds reviewed with patient/caregiver?  Yes   Is the patient taking all medications as directed (includes completed medication regime)?  Yes   Has the patient kept scheduled appointments due by today?  Yes   What is the patient's perception of their health status since discharge?  Improving   Is the patient/caregiver able to teach back the hierarchy of who to call/visit for symptoms/problems? PCP, Specialist, Home health nurse, Urgent Care, ED, 911  Yes   Additional teach back comments  Pt says he is still a little fatigued but is doing better, no questions or concerns at this time.   Week 3 Call Completed?  Yes   Wrap up additional comments  rushed phone call, pt was in car          Ghazal Mehta RN

## 2019-07-24 RX ORDER — LEVOTHYROXINE SODIUM 0.12 MG/1
125 TABLET ORAL DAILY
Qty: 90 TABLET | Refills: 1 | Status: SHIPPED | OUTPATIENT
Start: 2019-07-24 | End: 2019-11-18

## 2019-07-26 ENCOUNTER — APPOINTMENT (OUTPATIENT)
Dept: LAB | Facility: HOSPITAL | Age: 69
End: 2019-07-26

## 2019-07-26 ENCOUNTER — APPOINTMENT (OUTPATIENT)
Dept: ONCOLOGY | Facility: HOSPITAL | Age: 69
End: 2019-07-26

## 2019-08-01 ENCOUNTER — OFFICE VISIT (OUTPATIENT)
Dept: ONCOLOGY | Facility: CLINIC | Age: 69
End: 2019-08-01

## 2019-08-01 ENCOUNTER — LAB (OUTPATIENT)
Dept: LAB | Facility: HOSPITAL | Age: 69
End: 2019-08-01

## 2019-08-01 VITALS
WEIGHT: 182.4 LBS | SYSTOLIC BLOOD PRESSURE: 112 MMHG | RESPIRATION RATE: 18 BRPM | BODY MASS INDEX: 27.02 KG/M2 | OXYGEN SATURATION: 99 % | HEIGHT: 69 IN | DIASTOLIC BLOOD PRESSURE: 69 MMHG | TEMPERATURE: 98.6 F | HEART RATE: 77 BPM

## 2019-08-01 DIAGNOSIS — D70.3 NEUTROPENIA ASSOCIATED WITH INFECTION (HCC): Primary | ICD-10-CM

## 2019-08-01 DIAGNOSIS — D69.6 THROMBOCYTOPENIA (HCC): Primary | ICD-10-CM

## 2019-08-01 LAB
BASOPHILS # BLD AUTO: 0.06 10*3/MM3 (ref 0–0.2)
BASOPHILS NFR BLD AUTO: 0.7 % (ref 0–1.5)
DEPRECATED RDW RBC AUTO: 47.8 FL (ref 37–54)
EOSINOPHIL # BLD AUTO: 0.19 10*3/MM3 (ref 0–0.4)
EOSINOPHIL NFR BLD AUTO: 2.2 % (ref 0.3–6.2)
ERYTHROCYTE [DISTWIDTH] IN BLOOD BY AUTOMATED COUNT: 14.3 % (ref 12.3–15.4)
HCT VFR BLD AUTO: 43.3 % (ref 37.5–51)
HGB BLD-MCNC: 14.8 G/DL (ref 13–17.7)
IMM GRANULOCYTES # BLD AUTO: 0.23 10*3/MM3 (ref 0–0.05)
IMM GRANULOCYTES NFR BLD AUTO: 2.7 % (ref 0–0.5)
LYMPHOCYTES # BLD AUTO: 2.43 10*3/MM3 (ref 0.7–3.1)
LYMPHOCYTES NFR BLD AUTO: 28.7 % (ref 19.6–45.3)
MCH RBC QN AUTO: 31.2 PG (ref 26.6–33)
MCHC RBC AUTO-ENTMCNC: 34.2 G/DL (ref 31.5–35.7)
MCV RBC AUTO: 91.2 FL (ref 79–97)
MONOCYTES # BLD AUTO: 0.76 10*3/MM3 (ref 0.1–0.9)
MONOCYTES NFR BLD AUTO: 9 % (ref 5–12)
NEUTROPHILS # BLD AUTO: 4.8 10*3/MM3 (ref 1.7–7)
NEUTROPHILS NFR BLD AUTO: 56.7 % (ref 42.7–76)
NRBC BLD AUTO-RTO: 0.5 /100 WBC (ref 0–0.2)
PLATELET # BLD AUTO: 206 10*3/MM3 (ref 140–450)
PMV BLD AUTO: 10.7 FL (ref 6–12)
RBC # BLD AUTO: 4.75 10*6/MM3 (ref 4.14–5.8)
WBC NRBC COR # BLD: 8.47 10*3/MM3 (ref 3.4–10.8)

## 2019-08-01 PROCEDURE — 99213 OFFICE O/P EST LOW 20 MIN: CPT | Performed by: INTERNAL MEDICINE

## 2019-08-01 PROCEDURE — 85025 COMPLETE CBC W/AUTO DIFF WBC: CPT | Performed by: INTERNAL MEDICINE

## 2019-08-01 PROCEDURE — 36415 COLL VENOUS BLD VENIPUNCTURE: CPT | Performed by: INTERNAL MEDICINE

## 2019-08-01 RX ORDER — IRBESARTAN 300 MG/1
300 TABLET ORAL NIGHTLY
COMMUNITY
End: 2019-08-15 | Stop reason: SDUPTHER

## 2019-08-01 NOTE — PROGRESS NOTES
Subjective .     REASONS FOR FOLLOWUP: Leukopenia and thrombocytopenia thought to be due to viral illness.    HISTORY OF PRESENT ILLNESS:  The patient is a 68 y.o. year old male who is here for 2-week follow-up with the above-mentioned history.  Patient is a hospital return.  He was admitted with significantly high fever and neutropenia and thrombocytopenia which is completely resolved now.  He had a complete work-up in the hospital including infectious disease consult.  It was thought to be secondary to viral illness.  Spinal tap was negative and all cultures were negative    Patient reports he is feeling well overall. He is somewhat fatigued after exertion such as working in the yard and experiences some shortness of breath. He notes he has been taking naps more frequently. He denies any fever or night sweats. Patient has held his cholesterol medication since being in the hospital. I advised him to follow up with his PCP for further evaluation.     Patient complains of little red spots on abdomen, chest and back recently which are crusting over. Patient reports he is followed up by dermatology.     Laboratory study performed today showed mostly normal CBC with elevated immature grans of 0.23 and elevated nRBC of 0.5.     Patient has follow up appointment scheduled with Dr. Felder on August 15.    Past Medical History:   Diagnosis Date   • Allergic rhinitis    • Arthritis    • Cancer (CMS/HCC)     skin   • Cervical spondylosis with myelopathy    • Elevated cholesterol    • FHx: colonic polyps    • GERD (gastroesophageal reflux disease)    • History of colonic polyps    • History of kidney stones    • Hyperlipidemia    • Hypertension     Benign Essential   • Hypothyroidism    • Insomnia    • Malaise and fatigue    • Pure hypercholesterolemia        ONCOLOGIC HISTORY:  (History from previous dates can be found in the separate document.)  History of Present Illness patient is a 68-year-old gentleman who was  admitted.  He has Istria of Raynaud's phenomena.  Patient states that he had a thick attached to him on June 17, 2019 which he pulled.  Couple of days later he started having symptoms of fatigue and weakness and worsening low back pain.  This improved a few days later and he started having a fever.  The temperature was 100.3 with chills and felt bad.  He saw his primary care physician on June 27 who started him on doxycycline.  The next day he went to the primary care and had a CBC which showed a WBC count of 2000.  Further work-up showed CARMEL and double-stranded DNA were done.  He presented to the emergency room on June 30 which of starting stabbing left-sided chest pains with a temperature of 101.8.  He did travel to [place] and come back prior to being bitten by the tech.  Repeat work-up in the emergency room was done.  Diagnosis of tickborne illness was bait and serology for ehrlichiosis has been done.  He was placed on doxycycline and discharged but then he came back he became more confused and sleepy and sluggish.  The chest pain had resolved.     Looking at his blood counts is becoming more pancytopenic since June 27, 2019.  His hemoglobin was 15.2 and it stayed stable at 15.  But his white count was 2.79 it dropped down to 1.27 and his platelet dropped from 715-75.  His chest x-ray was negative.  CT head was negative.  There was concern that there is reactivation of CMV and Rudy-Barr viral infection.     And underwent CT angiogram of the chest.  The pulmonary arteries well-developed opacified no pulmonary embolism.  The lungs were clear without any adenopathy.  CT abdomen pelvis was negative.  There is mild enlargement of the prostate 5.7 cm.     Infectious disease was concerned that leukopenia and thrombocytopenia were concerning for leukemia.  We were consulted because of that     Underwent CSF analysis.  Flow was sent on the CSF and cytology was sent pending.  CSF protein is 65.,  CSF had 197 RBCs, 7  glucose is 60.  Ferritin 528.  HIV pending, Rudy-Barr virus pending and CMV pending, hepatitis profile nonreactive respiratory viral panel negative blood culture negative, lactic acid 0.02     CT head negative.  CBC 7 months ago was normal.  CARMEL negative.  Only recently her CBC is abnormal since this admission.    Recent follow up laboratory studies over the past month have been completely completely normal.    Current Outpatient Medications on File Prior to Visit   Medication Sig Dispense Refill   • irbesartan (AVAPRO) 300 MG tablet Take 300 mg by mouth Every Night.     • levothyroxine (SYNTHROID, LEVOTHROID) 125 MCG tablet Take 1 tablet by mouth Daily. 90 tablet 1   • atorvastatin (LIPITOR) 20 MG tablet Take 20 mg by mouth Daily.     • HYDROcodone-acetaminophen (NORCO) 5-325 MG per tablet Take 1 tablet by mouth Every 6 (Six) Hours As Needed for Moderate Pain . 8 tablet 0     No current facility-administered medications on file prior to visit.        ALLERGIES:   No Known Allergies    Social History     Socioeconomic History   • Marital status:      Spouse name: Yecenia   • Number of children: 2   • Years of education: POST GRAD   • Highest education level: Not on file   Occupational History   • Occupation: RETIRED   Tobacco Use   • Smoking status: Never Smoker   • Smokeless tobacco: Never Used   Substance and Sexual Activity   • Alcohol use: Yes     Alcohol/week: 0.6 oz     Types: 1 Shots of liquor per week     Comment: 3-5 times a week   • Drug use: Defer   • Sexual activity: Defer   Social History Narrative    LIVES WITH SPOUSE         Cancer-related family history includes Breast cancer in his sister; Cancer in his maternal uncle and sister; Liver cancer in his sister; Prostate cancer in his brother.     Review of Systems   Constitutional: Positive for fatigue (08/01/19-Same). Negative for activity change, appetite change, chills, diaphoresis, fever and unexpected weight change.   HENT: Negative for  "hearing loss, mouth sores, nosebleeds, sore throat and trouble swallowing.    Respiratory: Positive for shortness of breath (08/01/19-with activity x1 week). Negative for cough, chest tightness and wheezing.    Cardiovascular: Negative for chest pain, palpitations and leg swelling.   Gastrointestinal: Negative for abdominal distention, abdominal pain, constipation, diarrhea, nausea and vomiting.   Genitourinary: Negative for difficulty urinating, dysuria, frequency, hematuria and urgency.   Musculoskeletal: Negative for joint swelling.        No muscle weakness.   Skin: Negative for rash and wound.   Neurological: Negative for dizziness, seizures, syncope, speech difficulty, weakness, numbness and headaches.   Hematological: Negative for adenopathy. Does not bruise/bleed easily.   Psychiatric/Behavioral: Negative for behavioral problems, confusion, sleep disturbance and suicidal ideas.   7/19/2019 review of systems unchanged from above except as updated.    Objective      Vitals:    08/01/19 1428   BP: 112/69   Pulse: 77   Resp: 18   Temp: 98.6 °F (37 °C)   TempSrc: Oral   SpO2: 99%   Weight: 82.7 kg (182 lb 6.4 oz)   Height: 174.5 cm (68.7\")   PainSc: 0-No pain     Current Status 8/1/2019   ECOG score 0     PHYSICAL EXAMINATION:     GENERAL:  Well-developed, well-nourished in no acute distress.   SKIN:  Warm. Has some erythematous spots of irritation on his chest, abdomen and back.   NECK:  Supple with good range of motion; no thyromegaly or masses, no JVD.  LYMPHATICS:  No cervical, supraclavicular, axillary or inguinal adenopathy.  CHEST:  Lungs clear to auscultation. Good airflow.   CARDIAC:  Regular rate and rhythm without murmurs, rubs or gallops. Normal S1,S2.  ABDOMEN:  Soft, nontender with no hepatosplenomegaly or masses.  EXTREMITIES:  No clubbing, cyanosis or edema.  NEUROLOGICAL:  Cranial Nerves II-XII grossly intact.  No focal neurological deficits.  PSYCHIATRIC:  Normal affect and mood.        RECENT " LABS:  Hematology WBC   Date Value Ref Range Status   08/01/2019 8.47 3.40 - 10.80 10*3/mm3 Final   11/06/2018 5.09 4.50 - 10.70 10*3/mm3 Final     RBC   Date Value Ref Range Status   08/01/2019 4.75 4.14 - 5.80 10*6/mm3 Final   11/06/2018 4.86 4.60 - 6.00 10*6/mm3 Final     Hemoglobin   Date Value Ref Range Status   08/01/2019 14.8 13.0 - 17.7 g/dL Final     Hematocrit   Date Value Ref Range Status   08/01/2019 43.3 37.5 - 51.0 % Final     Platelets   Date Value Ref Range Status   08/01/2019 206 140 - 450 10*3/mm3 Final        Assessment/Plan     1.  Fever with neutropenia and thrombocytopenia: Being followed by infectious disease, Dr. Felder.  CSF studies unremarkable.  CMV, EBV,  IgG, IgM, work-up for tickborne illness, all negative.  Acute hepatitis panel negative.  Spinal tap was negative currently there is resolution of both the fever, neutropenia and thrombocytopenia.    · Fortunately, his white blood cell and platelet count had normalized 07/19/2019. We will continue to closely monitor his counts over the next few weeks.  · Patient returned 7/19/2019 for follow-up and his CBC had normalized.   · Laboratory study performed today 08/01/2019 was completely normal.      PLAN:  1. Patient is generally asymptomatic and will be released from our clinic today. He may contact us as necessary in the future.   2. Patient to follow up with PCP for regulation of his cholesterol medication.  3. Follow-up with dermatology for erythematous irritation on chest, abdomen and back.   4. Follow-up with Dr. Felder as scheduled.      I, Tasha Geronimo, acted as scribe for Elise Johnson MD  in documenting the service or procedure. To the best of my knowledge, I recorded what was dictated by MD Elise Amos MD  08/01/2019    Jimbo Lemos MD

## 2019-08-05 RX ORDER — IRBESARTAN 300 MG/1
TABLET ORAL
Qty: 90 TABLET | Refills: 1 | Status: SHIPPED | OUTPATIENT
Start: 2019-08-05 | End: 2020-01-27 | Stop reason: SDUPTHER

## 2019-08-15 ENCOUNTER — OFFICE VISIT (OUTPATIENT)
Dept: INFECTIOUS DISEASES | Facility: CLINIC | Age: 69
End: 2019-08-15

## 2019-08-15 VITALS
HEART RATE: 67 BPM | BODY MASS INDEX: 25.79 KG/M2 | DIASTOLIC BLOOD PRESSURE: 79 MMHG | HEIGHT: 71 IN | RESPIRATION RATE: 12 BRPM | SYSTOLIC BLOOD PRESSURE: 134 MMHG | TEMPERATURE: 97.7 F | WEIGHT: 184.2 LBS

## 2019-08-15 DIAGNOSIS — D69.6 THROMBOCYTOPENIA (HCC): ICD-10-CM

## 2019-08-15 DIAGNOSIS — D70.3 NEUTROPENIA ASSOCIATED WITH INFECTION (HCC): ICD-10-CM

## 2019-08-15 DIAGNOSIS — K75.9 HEPATITIS: Primary | ICD-10-CM

## 2019-08-15 PROCEDURE — 99214 OFFICE O/P EST MOD 30 MIN: CPT | Performed by: INTERNAL MEDICINE

## 2019-08-15 NOTE — PROGRESS NOTES
"cc: Follow-up encephalopathy, leukopenia    Patient here after hospitalization in early July for leukopenia, thrombocytopenia, hepatitis and encephalopathy.  He slowly improved.  We suspected either viral illness or tickborne infection and treated empirically for tick infections with doxycycline.  Slow convalescence but eventually made out of the hospital.  He reports that since discharge he is about 98% back to baseline.  Reports that he just has a little bit of fatigue and not quite the energy especially when he exerts himself.  His hepatitis is resolved and he denies any active abdominal symptoms.  No easy bruising or bleeding and thrombocytopenia repeat CBC from earlier this month was normal.  He has not had any recurrent infectious symptoms such as fever, chills, night sweats and neutropenia resolved    Past medical history: Skin cancer, hypothyroidism, hyperlipidemia, hypertension, cervical spondylolysis, cyst removal, fracture repair of the thumb    Review of Systems: Neck pain.  Back pain. All other reviewed and negative except as per HPI    Blood pressure 134/79, pulse 67, temperature 97.7 °F (36.5 °C), resp. rate 12, height 179.1 cm (70.5\"), weight 83.6 kg (184 lb 3.2 oz).  GENERAL: Awake and alert, in no acute distress.   SKIN: Warm and dry without cutaneous eruptions suffer some mild papules on his chest for which he is going to see dermatology  PSYCHIATRIC: Appropriate mood, affect, insight, and judgment.       DIAGNOSTICS:  Lab Results   Component Value Date    WBC 8.47 08/01/2019    HGB 14.8 08/01/2019    HCT 43.3 08/01/2019     08/01/2019       CMP normal except for glucose of 136 and ALT of 47    Assessment and Plan  Hepatitis  Neutropenia associated with infection (CMS/HCC)  Thrombocytopenia (CMS/HCC)    Neutropenia and thrombocytopenia and hepatitis all resolved with supportive care.  Suspect viral illness.  Discussed with patient we could perform tick panel to see if he is developed any " convalescent titers.  He declines.  Wife asked about potential heart[place] virus which I agreed could be a possibility and we could send off to [facility] but again he declines.  We discussed general avoidance of tick infections, means to militate against the acquisitions of infections.  He understands.  He can return to see me on an as-needed basis.

## 2019-11-13 ENCOUNTER — OFFICE VISIT (OUTPATIENT)
Dept: INTERNAL MEDICINE | Facility: CLINIC | Age: 69
End: 2019-11-13

## 2019-11-13 VITALS
HEART RATE: 63 BPM | WEIGHT: 190 LBS | DIASTOLIC BLOOD PRESSURE: 94 MMHG | BODY MASS INDEX: 26.6 KG/M2 | SYSTOLIC BLOOD PRESSURE: 132 MMHG | OXYGEN SATURATION: 99 % | HEIGHT: 71 IN

## 2019-11-13 DIAGNOSIS — Z00.00 ANNUAL PHYSICAL EXAM: ICD-10-CM

## 2019-11-13 DIAGNOSIS — Z12.5 SCREENING FOR PROSTATE CANCER: ICD-10-CM

## 2019-11-13 DIAGNOSIS — I10 BENIGN ESSENTIAL HTN: Primary | ICD-10-CM

## 2019-11-13 DIAGNOSIS — G89.29 CHRONIC BILATERAL LOW BACK PAIN WITHOUT SCIATICA: ICD-10-CM

## 2019-11-13 DIAGNOSIS — I73.00 RAYNAUD'S PHENOMENON WITHOUT GANGRENE: Chronic | ICD-10-CM

## 2019-11-13 DIAGNOSIS — D70.3 NEUTROPENIA ASSOCIATED WITH INFECTION (HCC): ICD-10-CM

## 2019-11-13 DIAGNOSIS — D69.6 THROMBOCYTOPENIA (HCC): Chronic | ICD-10-CM

## 2019-11-13 DIAGNOSIS — M54.50 CHRONIC BILATERAL LOW BACK PAIN WITHOUT SCIATICA: ICD-10-CM

## 2019-11-13 LAB
ALBUMIN SERPL-MCNC: 4.6 G/DL (ref 3.5–5.2)
ALBUMIN/GLOB SERPL: 1.8 G/DL
ALP SERPL-CCNC: 78 U/L (ref 39–117)
ALT SERPL-CCNC: 20 U/L (ref 1–41)
AST SERPL-CCNC: 20 U/L (ref 1–40)
BASOPHILS # BLD AUTO: 0.03 10*3/MM3 (ref 0–0.2)
BASOPHILS NFR BLD AUTO: 0.6 % (ref 0–1.5)
BILIRUB SERPL-MCNC: 0.6 MG/DL (ref 0.2–1.2)
BUN SERPL-MCNC: 11 MG/DL (ref 8–23)
BUN/CREAT SERPL: 9.3 (ref 7–25)
CALCIUM SERPL-MCNC: 9.5 MG/DL (ref 8.6–10.5)
CHLORIDE SERPL-SCNC: 101 MMOL/L (ref 98–107)
CHOLEST SERPL-MCNC: 120 MG/DL (ref 0–200)
CO2 SERPL-SCNC: 27 MMOL/L (ref 22–29)
CREAT SERPL-MCNC: 1.18 MG/DL (ref 0.76–1.27)
DEPRECATED RDW RBC AUTO: 44.2 FL (ref 37–54)
EOSINOPHIL # BLD AUTO: 0.16 10*3/MM3 (ref 0–0.4)
EOSINOPHIL NFR BLD AUTO: 3 % (ref 0.3–6.2)
ERYTHROCYTE [DISTWIDTH] IN BLOOD BY AUTOMATED COUNT: 13.6 % (ref 12.3–15.4)
GLOBULIN SER CALC-MCNC: 2.6 GM/DL
GLUCOSE SERPL-MCNC: 85 MG/DL (ref 65–99)
HCT VFR BLD AUTO: 45.6 % (ref 37.5–51)
HDLC SERPL-MCNC: 54 MG/DL (ref 40–60)
HGB BLD-MCNC: 15.7 G/DL (ref 13–17.7)
LDLC SERPL CALC-MCNC: 52 MG/DL (ref 0–100)
LYMPHOCYTES # BLD AUTO: 1.93 10*3/MM3 (ref 0.7–3.1)
LYMPHOCYTES NFR BLD AUTO: 36.3 % (ref 19.6–45.3)
MCH RBC QN AUTO: 31.3 PG (ref 26.6–33)
MCHC RBC AUTO-ENTMCNC: 34.4 G/DL (ref 31.5–35.7)
MCV RBC AUTO: 90.8 FL (ref 79–97)
MONOCYTES # BLD AUTO: 0.37 10*3/MM3 (ref 0.1–0.9)
MONOCYTES NFR BLD AUTO: 7 % (ref 5–12)
NEUTROPHILS # BLD AUTO: 2.82 10*3/MM3 (ref 1.7–7)
NEUTROPHILS NFR BLD AUTO: 53.1 % (ref 42.7–76)
PLATELET # BLD AUTO: 297 10*3/MM3 (ref 140–450)
PMV BLD AUTO: 10.6 FL (ref 6–12)
POTASSIUM SERPL-SCNC: 4.4 MMOL/L (ref 3.5–5.2)
PROT SERPL-MCNC: 7.2 G/DL (ref 6–8.5)
PSA SERPL-MCNC: 2.76 NG/ML (ref 0–4)
RBC # BLD AUTO: 5.02 10*6/MM3 (ref 4.14–5.8)
SODIUM SERPL-SCNC: 141 MMOL/L (ref 136–145)
TRIGL SERPL-MCNC: 68 MG/DL (ref 0–150)
TSH SERPL-ACNC: 0.25 UIU/ML (ref 0.27–4.2)
VLDLC SERPL-MCNC: 13.6 MG/DL
WBC NRBC COR # BLD: 5.31 10*3/MM3 (ref 3.4–10.8)

## 2019-11-13 PROCEDURE — 85025 COMPLETE CBC W/AUTO DIFF WBC: CPT | Performed by: INTERNAL MEDICINE

## 2019-11-13 PROCEDURE — 99397 PER PM REEVAL EST PAT 65+ YR: CPT | Performed by: INTERNAL MEDICINE

## 2019-11-13 NOTE — PROGRESS NOTES
Subjective   Anoop Montenegro is a 69 y.o. male.  The patient presents with chief complaint of benign hypertension, Raynaud's disease, chronic low back pain with multiple interventions that have not helped him very much, status post hospitalization for a viral encephalitis that almost killed him and cause profound neutropenia and thrombocytopenia with change of mental status.  Unfortunately the entity that infected him was never clearly defined but fortunately the patient made a complete recovery.  He is here for general physical examination and according to him he is done remarkably well since his discharge from the hospital.    History of Present Illness hospitalization for viral encephalitis of unknown etiology    The following portions of the patient's history were reviewed and updated as appropriate: allergies, current medications, past family history, past medical history, past social history, past surgical history and problem list.    Review of Systems   Constitutional: Positive for fatigue.   HENT: Negative.    Eyes: Negative.    Respiratory: Negative.    Cardiovascular: Negative.    Gastrointestinal: Negative.    Endocrine: Negative.    Genitourinary: Negative.    Musculoskeletal: Positive for arthralgias.   Skin: Negative.    Allergic/Immunologic: Negative.    Neurological: Negative.    Hematological: Negative.    Psychiatric/Behavioral: Negative.        Objective   Physical Exam   Constitutional: He is oriented to person, place, and time. He appears well-developed and well-nourished.   HENT:   Head: Normocephalic and atraumatic.   Right Ear: External ear normal.   Left Ear: External ear normal.   Mouth/Throat: Oropharynx is clear and moist.   Eyes: Conjunctivae and EOM are normal. Pupils are equal, round, and reactive to light.   Neck: Normal range of motion. Neck supple.   Cardiovascular: Normal rate, regular rhythm and normal heart sounds.   Pulmonary/Chest: Effort normal and breath sounds normal.    Abdominal: Soft. Bowel sounds are normal.   Musculoskeletal: Normal range of motion.   Chronic low back pain with limited range of motion of his lumbar spine   Neurological: He is alert and oriented to person, place, and time.   Skin: Skin is warm and dry.   Psychiatric: He has a normal mood and affect.   Nursing note and vitals reviewed.        Assessment/Plan   Anoop was seen today for annual exam, hyperlipidemia and hypertension.    Diagnoses and all orders for this visit:    Benign essential HTN  Comments:  continue to monitor    Neutropenia associated with infection (CMS/HCC)  Comments:  check a CBC    Thrombocytopenia (CMS/HCC)  Comments:  check a CBC    Raynaud's phenomenon without gangrene  Comments:  doing well for now    Annual physical exam  -     CBC & Differential; Future  -     Lipid Panel; Future  -     Comprehensive Metabolic Panel; Future  -     TSH; Future    Screening for prostate cancer  Comments:  check a PSA  Orders:  -     PSA Screen; Future    Chronic bilateral low back pain without sciatica  Comments:  CBD oil

## 2019-11-18 RX ORDER — LEVOTHYROXINE SODIUM 112 UG/1
112 TABLET ORAL DAILY
Qty: 90 TABLET | Refills: 3 | Status: SHIPPED | OUTPATIENT
Start: 2019-11-18 | End: 2020-02-28 | Stop reason: SDUPTHER

## 2019-12-10 ENCOUNTER — PATIENT MESSAGE (OUTPATIENT)
Dept: INTERNAL MEDICINE | Facility: CLINIC | Age: 69
End: 2019-12-10

## 2019-12-10 DIAGNOSIS — F51.01 PRIMARY INSOMNIA: Primary | ICD-10-CM

## 2019-12-10 RX ORDER — ZOLPIDEM TARTRATE 10 MG/1
10 TABLET ORAL NIGHTLY PRN
Qty: 30 TABLET | Refills: 0 | Status: SHIPPED | OUTPATIENT
Start: 2019-12-10 | End: 2020-06-01 | Stop reason: SDUPTHER

## 2019-12-10 NOTE — TELEPHONE ENCOUNTER
From: Anoop Montenegro  To: Jimbo Hurley MD  Sent: 12/10/2019 6:34 AM EST  Subject: Prescription Question    I am having trouble sleeping. In the past Dr Mcginnis perscribed Zolpidem. Would you please write a perscription?    Arron Montenegro  [phone]

## 2019-12-12 ENCOUNTER — TELEPHONE (OUTPATIENT)
Dept: INTERNAL MEDICINE | Facility: CLINIC | Age: 69
End: 2019-12-12

## 2019-12-23 RX ORDER — ATORVASTATIN CALCIUM 20 MG/1
TABLET, FILM COATED ORAL
Qty: 90 TABLET | Refills: 1 | Status: SHIPPED | OUTPATIENT
Start: 2019-12-23 | End: 2020-03-18 | Stop reason: SDUPTHER

## 2020-01-06 ENCOUNTER — OFFICE VISIT (OUTPATIENT)
Dept: INTERNAL MEDICINE | Facility: CLINIC | Age: 70
End: 2020-01-06

## 2020-01-06 VITALS
BODY MASS INDEX: 27.92 KG/M2 | HEART RATE: 64 BPM | OXYGEN SATURATION: 98 % | HEIGHT: 70 IN | SYSTOLIC BLOOD PRESSURE: 134 MMHG | WEIGHT: 195 LBS | DIASTOLIC BLOOD PRESSURE: 90 MMHG

## 2020-01-06 DIAGNOSIS — H01.00B BLEPHARITIS OF UPPER AND LOWER EYELIDS OF BOTH EYES, UNSPECIFIED TYPE: Primary | ICD-10-CM

## 2020-01-06 DIAGNOSIS — H01.00A BLEPHARITIS OF UPPER AND LOWER EYELIDS OF BOTH EYES, UNSPECIFIED TYPE: Primary | ICD-10-CM

## 2020-01-06 PROCEDURE — 99213 OFFICE O/P EST LOW 20 MIN: CPT | Performed by: NURSE PRACTITIONER

## 2020-01-06 RX ORDER — AZELASTINE HYDROCHLORIDE 0.5 MG/ML
1 SOLUTION/ DROPS OPHTHALMIC 2 TIMES DAILY
Qty: 6 ML | Refills: 0 | Status: SHIPPED | OUTPATIENT
Start: 2020-01-06 | End: 2020-12-03

## 2020-01-06 NOTE — PROGRESS NOTES
Subjective     Anoop Montenegro is a 69 y.o. male.         He presents with redness to R upper eye lid and L lower eye lid. Started on L lid and spread to R lid.    Eye Problem    Both eyes are affected.This is a new problem. The current episode started in the past 7 days. The problem occurs constantly. The problem has been gradually worsening. There was no injury mechanism. The patient is experiencing no pain. Associated symptoms include eye redness (to eyelids) and itching. Pertinent negatives include no blurred vision, eye discharge, double vision, fever, foreign body sensation, photophobia or recent URI. Treatments tried: ibuprofen, warm and cold compress. The treatment provided mild relief.        The following portions of the patient's history were reviewed and updated as appropriate: allergies, current medications, past social history and problem list.    Past Medical History:   Diagnosis Date   • Allergic rhinitis    • Arthritis    • Cancer (CMS/HCC)     skin   • Cervical spondylosis with myelopathy    • Elevated cholesterol    • FHx: colonic polyps    • GERD (gastroesophageal reflux disease)    • History of colonic polyps    • History of kidney stones    • Hyperlipidemia    • Hypertension     Benign Essential   • Hypothyroidism    • Insomnia    • Malaise and fatigue    • Pure hypercholesterolemia          Current Outpatient Medications:   •  atorvastatin (LIPITOR) 20 MG tablet, TAKE 1 TABLET BY MOUTH ONE TIME A DAY , Disp: 90 tablet, Rfl: 1  •  irbesartan (AVAPRO) 300 MG tablet, TAKE 1 TABLET DAILY, Disp: 90 tablet, Rfl: 1  •  levothyroxine (SYNTHROID) 112 MCG tablet, Take 1 tablet by mouth Daily., Disp: 90 tablet, Rfl: 3  •  zolpidem (AMBIEN) 10 MG tablet, Take 1 tablet by mouth At Night As Needed for Sleep., Disp: 30 tablet, Rfl: 0  •  azelastine (OPTIVAR) 0.05 % ophthalmic solution, Administer 1 drop to both eyes 2 (Two) Times a Day., Disp: 6 mL, Rfl: 0    No Known Allergies    Review of Systems  "  Constitutional: Negative for fever.   HENT: Negative for congestion and postnasal drip.    Eyes: Positive for redness (to eyelids) and itching. Negative for blurred vision, double vision, photophobia, pain, discharge and visual disturbance.   Respiratory: Negative for cough and shortness of breath.    Skin: Positive for color change.       Objective     /90   Pulse 64   Ht 177.8 cm (70\")   Wt 88.5 kg (195 lb)   SpO2 98%   BMI 27.98 kg/m²   Wt Readings from Last 3 Encounters:   01/06/20 88.5 kg (195 lb)   11/13/19 86.2 kg (190 lb)   08/15/19 83.6 kg (184 lb 3.2 oz)     Temp Readings from Last 3 Encounters:   08/15/19 97.7 °F (36.5 °C)   08/01/19 98.6 °F (37 °C) (Oral)   07/19/19 98.3 °F (36.8 °C)     BP Readings from Last 3 Encounters:   01/06/20 134/90   11/13/19 132/94   08/15/19 134/79     Pulse Readings from Last 3 Encounters:   01/06/20 64   11/13/19 63   08/15/19 67       Physical Exam   Constitutional: He appears well-developed and well-nourished.   HENT:   Head: Normocephalic and atraumatic.   Eyes: Pupils are equal, round, and reactive to light. EOM are normal. Right eye exhibits no hordeolum. Left eye exhibits no hordeolum. Right conjunctiva is not injected. Left conjunctiva is not injected.       Erythema and edema to R upper lid and L lower lid.   Pulmonary/Chest: Effort normal. No respiratory distress.   Musculoskeletal: Normal range of motion.   Neurological: He is alert.   Skin: Skin is warm and dry.   Psychiatric: He has a normal mood and affect. His behavior is normal. Judgment and thought content normal.   Vitals reviewed.      Assessment/Plan     Anoop was seen today for stye.    Diagnoses and all orders for this visit:    Blepharitis of upper and lower eyelids of both eyes, unspecified type  -     azelastine (OPTIVAR) 0.05 % ophthalmic solution; Administer 1 drop to both eyes 2 (Two) Times a Day.    Perform good hand washing.    Use warm compresses. Apply neosporin twice daily to " eyelids.    Blepharitis handout given.

## 2020-01-06 NOTE — PATIENT INSTRUCTIONS
Blepharitis  Blepharitis is inflammation of the eyelids. Blepharitis may happen with:  · Reddish, scaly skin around the scalp and eyebrows.  · Burning or itching of the eyelids.  · Eye discharge at night that causes the eyelashes to stick together in the morning.  · Eyelashes that fall out.  · Sensitivity to light.  Follow these instructions at home:  Pay attention to any changes in how your eyes look or feel. Tell your health care provider about any changes. Follow these instructions to help with your condition.  Keeping Clean    · Wash your hands often.  · Wash your eyelids with warm water or with warm water that is mixed with a small amount of baby shampoo. Do this two times per day or as often as needed.  · Wash your face and eyebrows at least once a day.  · Use a clean towel each time you dry your eyelids. Do not use this towel to clean or dry other areas of your body. Do not share your towel with anyone.  General instructions  · Avoid wearing makeup until you get better. Do not share makeup with anyone.  · Avoid rubbing your eyes.  · Apply warm compresses to your eyes 2 times per day for 10 minutes at a time, or as told by your health care provider.  · If you were prescribed an antibiotic ointment or steroid drops, apply or use the medicine as told by your health care provider. Do not stop using the medicine even if you feel better.  · Keep all follow-up visits as told by your health care provider. This is important.  Contact a health care provider if:  · Your eyelids feel hot.  · You have blisters or a rash on your eyelids.  · The condition does not go away in 2-4 days.  · The inflammation gets worse.  Get help right away if:  · You have pain or redness that gets worse or spreads to other parts of your face.  · Your vision changes.  · You have pain when looking at lights or moving objects.  · You have a fever.  Summary  · Blepharitis is inflammation of the eyelids.  · Pay attention to any changes in how your  eyes look or feel. Tell your health care provider about any changes.  · Follow home care instructions as told by your health care provider. Wash your hands often. Avoid wearing makeup. Do not rub your eyes.  · To treat this condition, use warm compresses and prescription ointments or eye drops.  · Let your health care provider know if you have vision changes, blisters or rash on eyelids, pain that spreads to your face, or warmth on your eyelids.  This information is not intended to replace advice given to you by your health care provider. Make sure you discuss any questions you have with your health care provider.  Document Released: 12/15/2001 Document Revised: 06/10/2019 Document Reviewed: 06/10/2019  [employer] Patient Education © 2019 [employer]      Apply neosporin to eyelids with Qtip twice daily.

## 2020-01-24 ENCOUNTER — OFFICE VISIT (OUTPATIENT)
Dept: INTERNAL MEDICINE | Facility: CLINIC | Age: 70
End: 2020-01-24

## 2020-01-24 VITALS
WEIGHT: 192 LBS | BODY MASS INDEX: 27.49 KG/M2 | SYSTOLIC BLOOD PRESSURE: 130 MMHG | HEART RATE: 80 BPM | HEIGHT: 70 IN | DIASTOLIC BLOOD PRESSURE: 80 MMHG | OXYGEN SATURATION: 99 % | TEMPERATURE: 98.5 F

## 2020-01-24 DIAGNOSIS — H00.019 HORDEOLUM, UNSPECIFIED HORDEOLUM TYPE, UNSPECIFIED LATERALITY: Primary | ICD-10-CM

## 2020-01-24 PROCEDURE — 99213 OFFICE O/P EST LOW 20 MIN: CPT | Performed by: NURSE PRACTITIONER

## 2020-01-24 RX ORDER — ERYTHROMYCIN 5 MG/G
OINTMENT OPHTHALMIC NIGHTLY
Qty: 3.5 G | Refills: 0 | Status: SHIPPED | OUTPATIENT
Start: 2020-01-24 | End: 2020-12-03

## 2020-01-27 ENCOUNTER — TELEPHONE (OUTPATIENT)
Dept: INTERNAL MEDICINE | Facility: CLINIC | Age: 70
End: 2020-01-27

## 2020-01-27 RX ORDER — IRBESARTAN 300 MG/1
300 TABLET ORAL DAILY
Qty: 90 TABLET | Refills: 1 | Status: SHIPPED | OUTPATIENT
Start: 2020-01-27 | End: 2020-07-24

## 2020-01-27 NOTE — PROGRESS NOTES
Subjective   Anoop Montenegro is a 69 y.o. male who presents due to irritation of eyelids bilaterally.    He c/o irritation of his eyelids bilateral with erythema and papules, denies visual change or injury. He has applied Neosporin but states this exacerbated sx. He was given Optivar eye drops a couple of weeks ago. Denies drainage from eyelids, does c/o irritation with pruritis.       The following portions of the patient's history were reviewed and updated as appropriate: allergies, current medications, past social history and problem list.    Past Medical History:   Diagnosis Date   • Allergic rhinitis    • Arthritis    • Cancer (CMS/HCC)     skin   • Cervical spondylosis with myelopathy    • Elevated cholesterol    • FHx: colonic polyps    • GERD (gastroesophageal reflux disease)    • History of colonic polyps    • History of kidney stones    • Hyperlipidemia    • Hypertension     Benign Essential   • Hypothyroidism    • Insomnia    • Malaise and fatigue    • Pure hypercholesterolemia          Current Outpatient Medications:   •  atorvastatin (LIPITOR) 20 MG tablet, TAKE 1 TABLET BY MOUTH ONE TIME A DAY , Disp: 90 tablet, Rfl: 1  •  azelastine (OPTIVAR) 0.05 % ophthalmic solution, Administer 1 drop to both eyes 2 (Two) Times a Day., Disp: 6 mL, Rfl: 0  •  irbesartan (AVAPRO) 300 MG tablet, TAKE 1 TABLET DAILY, Disp: 90 tablet, Rfl: 1  •  levothyroxine (SYNTHROID) 112 MCG tablet, Take 1 tablet by mouth Daily., Disp: 90 tablet, Rfl: 3  •  zolpidem (AMBIEN) 10 MG tablet, Take 1 tablet by mouth At Night As Needed for Sleep., Disp: 30 tablet, Rfl: 0  •  erythromycin (ROMYCIN) 5 MG/GM ophthalmic ointment, Administer  to both eyes Every Night., Disp: 3.5 g, Rfl: 0    No Known Allergies    Review of Systems   Constitutional: Negative for chills, fatigue, fever and unexpected weight change.   HENT: Negative for congestion, ear pain, postnasal drip, sinus pressure, sore throat and trouble swallowing.    Eyes: Positive for  "redness and itching. Negative for discharge and visual disturbance.   Respiratory: Negative for cough, chest tightness and wheezing.    Cardiovascular: Negative for chest pain, palpitations and leg swelling.   Gastrointestinal: Negative for abdominal pain, blood in stool, nausea and vomiting.   Genitourinary: Negative for dysuria, frequency and urgency.   Musculoskeletal: Negative for arthralgias and joint swelling.   Skin: Negative for color change.   Neurological: Negative for syncope, weakness and headaches.   Hematological: Does not bruise/bleed easily.       Objective   Vitals:    01/24/20 1333   BP: 130/80   BP Location: Left arm   Patient Position: Sitting   Cuff Size: Adult   Pulse: 80   Temp: 98.5 °F (36.9 °C)   TempSrc: Oral   SpO2: 99%   Weight: 87.1 kg (192 lb)   Height: 177.8 cm (70\")     Body mass index is 27.55 kg/m².  Physical Exam   Constitutional: He appears well-developed and well-nourished. He is cooperative. He does not have a sickly appearance. He does not appear ill.   HENT:   Head: Normocephalic.   Right Ear: Hearing, tympanic membrane and external ear normal.   Left Ear: Hearing, tympanic membrane and external ear normal.   Nose: Nose normal. No mucosal edema, rhinorrhea, sinus tenderness or nasal deformity. Right sinus exhibits no maxillary sinus tenderness and no frontal sinus tenderness. Left sinus exhibits no maxillary sinus tenderness and no frontal sinus tenderness.   Mouth/Throat: Oropharynx is clear and moist and mucous membranes are normal. Normal dentition.   Eyes: Conjunctivae and lids are normal. Right eye exhibits hordeolum. Right eye exhibits no discharge and no exudate. Left eye exhibits hordeolum. Left eye exhibits no discharge and no exudate.   2 small hordeolums (one on right lower eyelid and one on left upper eyelid)   Neck: Trachea normal. Carotid bruit is not present. No edema present. No thyroid mass present.   Cardiovascular: Regular rhythm, normal heart sounds and " normal pulses.   No murmur heard.  Pulmonary/Chest: Breath sounds normal. No respiratory distress. He has no decreased breath sounds. He has no wheezes. He has no rhonchi. He has no rales.   Lymphadenopathy:        Head (right side): No submental, no submandibular, no tonsillar, no preauricular, no posterior auricular and no occipital adenopathy present.        Head (left side): No submental, no submandibular, no tonsillar, no preauricular, no posterior auricular and no occipital adenopathy present.   Neurological: He is alert.   Skin: Skin is warm, dry and intact. No cyanosis. Nails show no clubbing.       Assessment/Plan   Anoop was seen today for eye problem.    Diagnoses and all orders for this visit:    Hordeolum, unspecified hordeolum type, unspecified laterality  -     erythromycin (ROMYCIN) 5 MG/GM ophthalmic ointment; Administer  to both eyes Every Night.    He will continue to apply warm compresses but will treat with topical Erythromycin ointment nightly. He will call the office if sx have not resolved in 3-5 days.       Answers for HPI/ROS submitted by the patient on 1/24/2020   How long have you been having these symptoms?: 1-4 weeks ago

## 2020-01-27 NOTE — TELEPHONE ENCOUNTER
Pt is requesting a refill of irbesartan and have it sent to [facility].

## 2020-02-28 RX ORDER — LEVOTHYROXINE SODIUM 112 UG/1
112 TABLET ORAL DAILY
Qty: 90 TABLET | Refills: 3 | Status: SHIPPED | OUTPATIENT
Start: 2020-02-28 | End: 2021-03-17 | Stop reason: SDUPTHER

## 2020-03-18 RX ORDER — ATORVASTATIN CALCIUM 20 MG/1
20 TABLET, FILM COATED ORAL DAILY
Qty: 90 TABLET | Refills: 1 | Status: SHIPPED | OUTPATIENT
Start: 2020-03-18 | End: 2020-09-21

## 2020-06-01 ENCOUNTER — OFFICE VISIT (OUTPATIENT)
Dept: INTERNAL MEDICINE | Facility: CLINIC | Age: 70
End: 2020-06-01

## 2020-06-01 VITALS
HEART RATE: 59 BPM | OXYGEN SATURATION: 99 % | BODY MASS INDEX: 26.77 KG/M2 | WEIGHT: 187 LBS | DIASTOLIC BLOOD PRESSURE: 80 MMHG | SYSTOLIC BLOOD PRESSURE: 130 MMHG | HEIGHT: 70 IN

## 2020-06-01 DIAGNOSIS — F51.01 PRIMARY INSOMNIA: Chronic | ICD-10-CM

## 2020-06-01 DIAGNOSIS — M54.50 CHRONIC BILATERAL LOW BACK PAIN WITHOUT SCIATICA: ICD-10-CM

## 2020-06-01 DIAGNOSIS — D69.6 THROMBOCYTOPENIA (HCC): ICD-10-CM

## 2020-06-01 DIAGNOSIS — F51.01 PRIMARY INSOMNIA: ICD-10-CM

## 2020-06-01 DIAGNOSIS — E03.9 ACQUIRED HYPOTHYROIDISM: Chronic | ICD-10-CM

## 2020-06-01 DIAGNOSIS — G89.29 CHRONIC BILATERAL LOW BACK PAIN WITHOUT SCIATICA: ICD-10-CM

## 2020-06-01 DIAGNOSIS — I10 BENIGN ESSENTIAL HTN: Primary | ICD-10-CM

## 2020-06-01 DIAGNOSIS — I73.00 RAYNAUD'S PHENOMENON WITHOUT GANGRENE: ICD-10-CM

## 2020-06-01 PROBLEM — G47.00 INSOMNIA: Status: ACTIVE | Noted: 2020-06-01

## 2020-06-01 PROCEDURE — 99214 OFFICE O/P EST MOD 30 MIN: CPT | Performed by: INTERNAL MEDICINE

## 2020-06-01 RX ORDER — ZOLPIDEM TARTRATE 10 MG/1
10 TABLET ORAL NIGHTLY PRN
Qty: 30 TABLET | Refills: 4 | Status: SHIPPED | OUTPATIENT
Start: 2020-06-01 | End: 2020-06-02

## 2020-06-01 RX ORDER — MELOXICAM 15 MG/1
15 TABLET ORAL DAILY
Qty: 30 TABLET | Refills: 4 | Status: SHIPPED | OUTPATIENT
Start: 2020-06-01 | End: 2021-06-03

## 2020-06-01 NOTE — PROGRESS NOTES
"Subjective   Anoop Montenegro is a 69 y.o. male.  Patient presents with chief complaint of essential hypertension, chronic low back pain, thrombocytopenia that is chronic as well, Raynaud's disease that is fairly well-controlled currently, hypothyroidism, primary insomnia, here for follow-up evaluation and treatment.  He is doing well overall having some difficulty with his back but would like to try Mobic to address that issue.      /80 (BP Location: Left arm, Patient Position: Sitting, Cuff Size: Adult)   Pulse 59   Ht 177.8 cm (70\")   Wt 84.8 kg (187 lb)   SpO2 99%   BMI 26.83 kg/m²     Body mass index is 26.83 kg/m².    History of Present Illness doing well except for chronic insomnia and episodic low back pain    The following portions of the patient's history were reviewed and updated as appropriate: allergies, current medications, past family history, past medical history, past social history, past surgical history and problem list.    Review of Systems   Constitutional: Positive for fatigue.   HENT: Negative.    Respiratory: Negative.    Cardiovascular: Negative.    Gastrointestinal: Negative.    Musculoskeletal: Positive for arthralgias and back pain.   Psychiatric/Behavioral: Positive for sleep disturbance.       Objective   Physical Exam   Constitutional: He is oriented to person, place, and time. He appears well-developed and well-nourished.   HENT:   Head: Normocephalic and atraumatic.   Cardiovascular: Normal rate, regular rhythm and normal heart sounds.   Pulmonary/Chest: Effort normal and breath sounds normal.   Abdominal: Soft. Bowel sounds are normal.   Musculoskeletal:   Chronic low back pain   Neurological: He is alert and oriented to person, place, and time.   Psychiatric: He has a normal mood and affect. His behavior is normal.   Nursing note and vitals reviewed.        Assessment/Plan   Anoop was seen today for hypertension.    Diagnoses and all orders for this visit:    Benign " essential HTN  Comments:  well controlled at present  Orders:  -     Comprehensive metabolic panel; Future  -     CBC w AUTO Differential; Future    Chronic bilateral low back pain without sciatica  Comments:  mobic 15 mg per day    Thrombocytopenia (CMS/HCC)  Comments:  check a CBC    Raynaud's phenomenon without gangrene  Comments:  doing better    Acquired hypothyroidism  Comments:  check a tsh  Orders:  -     TSH; Future    Primary insomnia  Comments:  ambien 10 mg qhs  Orders:  -     zolpidem (AMBIEN) 10 MG tablet; Take 1 tablet by mouth At Night As Needed for Sleep.    Primary insomnia  -     zolpidem (AMBIEN) 10 MG tablet; Take 1 tablet by mouth At Night As Needed for Sleep.    Other orders  -     meloxicam (MOBIC) 15 MG tablet; Take 1 tablet by mouth Daily.                 Answers for HPI/ROS submitted by the patient on 5/26/2020   What is the primary reason for your visit?: Other  Please describe your symptoms.: this is a followup from my annual checkup.   Last year at this time I was hospitalized with an unknown virus.  I would like to see the doctor for any concerns about that sickness and also get blood testing.  I am due the second pneumonia shot and possibly tetnus.  I would like to renew a zolpidem prescription.  Also need to followup for blufferitis.  Have you had these symptoms before?: Yes  How long have you been having these symptoms?: Greater than 2 weeks

## 2020-06-02 LAB
ALBUMIN SERPL-MCNC: 4.7 G/DL (ref 3.5–5.2)
ALBUMIN/GLOB SERPL: 1.9 G/DL
ALP SERPL-CCNC: 75 U/L (ref 39–117)
ALT SERPL-CCNC: 21 U/L (ref 1–41)
AST SERPL-CCNC: 17 U/L (ref 1–40)
BASOPHILS # BLD AUTO: 0.05 10*3/MM3 (ref 0–0.2)
BASOPHILS NFR BLD AUTO: 0.9 % (ref 0–1.5)
BILIRUB SERPL-MCNC: 0.5 MG/DL (ref 0.2–1.2)
BUN SERPL-MCNC: 16 MG/DL (ref 8–23)
BUN/CREAT SERPL: 12.8 (ref 7–25)
CALCIUM SERPL-MCNC: 9.7 MG/DL (ref 8.6–10.5)
CHLORIDE SERPL-SCNC: 104 MMOL/L (ref 98–107)
CO2 SERPL-SCNC: 27.1 MMOL/L (ref 22–29)
CREAT SERPL-MCNC: 1.25 MG/DL (ref 0.76–1.27)
EOSINOPHIL # BLD AUTO: 0.23 10*3/MM3 (ref 0–0.4)
EOSINOPHIL NFR BLD AUTO: 4.1 % (ref 0.3–6.2)
ERYTHROCYTE [DISTWIDTH] IN BLOOD BY AUTOMATED COUNT: 14.2 % (ref 12.3–15.4)
GLOBULIN SER CALC-MCNC: 2.5 GM/DL
GLUCOSE SERPL-MCNC: 92 MG/DL (ref 65–99)
HCT VFR BLD AUTO: 43.8 % (ref 37.5–51)
HGB BLD-MCNC: 14.6 G/DL (ref 13–17.7)
IMM GRANULOCYTES # BLD AUTO: 0.01 10*3/MM3 (ref 0–0.05)
IMM GRANULOCYTES NFR BLD AUTO: 0.2 % (ref 0–0.5)
LYMPHOCYTES # BLD AUTO: 1.87 10*3/MM3 (ref 0.7–3.1)
LYMPHOCYTES NFR BLD AUTO: 33.6 % (ref 19.6–45.3)
MCH RBC QN AUTO: 30.5 PG (ref 26.6–33)
MCHC RBC AUTO-ENTMCNC: 33.3 G/DL (ref 31.5–35.7)
MCV RBC AUTO: 91.6 FL (ref 79–97)
MONOCYTES # BLD AUTO: 0.35 10*3/MM3 (ref 0.1–0.9)
MONOCYTES NFR BLD AUTO: 6.3 % (ref 5–12)
NEUTROPHILS # BLD AUTO: 3.06 10*3/MM3 (ref 1.7–7)
NEUTROPHILS NFR BLD AUTO: 54.9 % (ref 42.7–76)
NRBC BLD AUTO-RTO: 0 /100 WBC (ref 0–0.2)
PLATELET # BLD AUTO: 327 10*3/MM3 (ref 140–450)
POTASSIUM SERPL-SCNC: 4.8 MMOL/L (ref 3.5–5.2)
PROT SERPL-MCNC: 7.2 G/DL (ref 6–8.5)
RBC # BLD AUTO: 4.78 10*6/MM3 (ref 4.14–5.8)
SODIUM SERPL-SCNC: 140 MMOL/L (ref 136–145)
TSH SERPL DL<=0.005 MIU/L-ACNC: 0.8 UIU/ML (ref 0.27–4.2)
WBC # BLD AUTO: 5.57 10*3/MM3 (ref 3.4–10.8)

## 2020-06-02 RX ORDER — ZOLPIDEM TARTRATE 10 MG/1
TABLET ORAL
Qty: 30 TABLET | Refills: 0 | Status: SHIPPED | OUTPATIENT
Start: 2020-06-02 | End: 2021-10-12 | Stop reason: SDUPTHER

## 2020-07-24 RX ORDER — IRBESARTAN 300 MG/1
TABLET ORAL
Qty: 90 TABLET | Refills: 1 | Status: SHIPPED | OUTPATIENT
Start: 2020-07-24 | End: 2021-02-15 | Stop reason: SDUPTHER

## 2020-09-21 RX ORDER — ATORVASTATIN CALCIUM 20 MG/1
TABLET, FILM COATED ORAL
Qty: 90 TABLET | Refills: 1 | Status: SHIPPED | OUTPATIENT
Start: 2020-09-21 | End: 2021-03-23 | Stop reason: SDUPTHER

## 2020-10-01 ENCOUNTER — OFFICE VISIT (OUTPATIENT)
Dept: ORTHOPEDIC SURGERY | Facility: CLINIC | Age: 70
End: 2020-10-01

## 2020-10-01 VITALS — BODY MASS INDEX: 27.22 KG/M2 | TEMPERATURE: 97.3 F | HEIGHT: 70 IN | WEIGHT: 190.1 LBS

## 2020-10-01 DIAGNOSIS — M75.42 IMPINGEMENT SYNDROME OF LEFT SHOULDER: ICD-10-CM

## 2020-10-01 DIAGNOSIS — M25.511 RIGHT SHOULDER PAIN, UNSPECIFIED CHRONICITY: Primary | ICD-10-CM

## 2020-10-01 PROCEDURE — 73030 X-RAY EXAM OF SHOULDER: CPT | Performed by: ORTHOPAEDIC SURGERY

## 2020-10-01 PROCEDURE — 99214 OFFICE O/P EST MOD 30 MIN: CPT | Performed by: ORTHOPAEDIC SURGERY

## 2020-10-01 PROCEDURE — 20610 DRAIN/INJ JOINT/BURSA W/O US: CPT | Performed by: ORTHOPAEDIC SURGERY

## 2020-10-01 RX ADMIN — METHYLPREDNISOLONE ACETATE 80 MG: 80 INJECTION, SUSPENSION INTRA-ARTICULAR; INTRALESIONAL; INTRAMUSCULAR; SOFT TISSUE at 10:11

## 2020-10-06 RX ORDER — METHYLPREDNISOLONE ACETATE 80 MG/ML
80 INJECTION, SUSPENSION INTRA-ARTICULAR; INTRALESIONAL; INTRAMUSCULAR; SOFT TISSUE
Status: COMPLETED | OUTPATIENT
Start: 2020-10-01 | End: 2020-10-01

## 2020-12-03 ENCOUNTER — OFFICE VISIT (OUTPATIENT)
Dept: INTERNAL MEDICINE | Facility: CLINIC | Age: 70
End: 2020-12-03

## 2020-12-03 VITALS
BODY MASS INDEX: 27.2 KG/M2 | WEIGHT: 190 LBS | SYSTOLIC BLOOD PRESSURE: 110 MMHG | HEART RATE: 53 BPM | DIASTOLIC BLOOD PRESSURE: 88 MMHG | OXYGEN SATURATION: 99 % | HEIGHT: 70 IN | TEMPERATURE: 97.8 F

## 2020-12-03 DIAGNOSIS — M50.30 DDD (DEGENERATIVE DISC DISEASE), CERVICAL: ICD-10-CM

## 2020-12-03 DIAGNOSIS — I10 BENIGN ESSENTIAL HTN: Primary | ICD-10-CM

## 2020-12-03 DIAGNOSIS — M48.02 CERVICAL SPINAL STENOSIS: ICD-10-CM

## 2020-12-03 DIAGNOSIS — E78.41 ELEVATED LIPOPROTEIN(A): ICD-10-CM

## 2020-12-03 DIAGNOSIS — D69.6 THROMBOCYTOPENIA (HCC): ICD-10-CM

## 2020-12-03 DIAGNOSIS — E03.9 ACQUIRED HYPOTHYROIDISM: ICD-10-CM

## 2020-12-03 DIAGNOSIS — Z12.5 SCREENING FOR PROSTATE CANCER: ICD-10-CM

## 2020-12-03 DIAGNOSIS — I73.00 RAYNAUD'S PHENOMENON WITHOUT GANGRENE: ICD-10-CM

## 2020-12-03 DIAGNOSIS — G47.33 OBSTRUCTIVE SLEEP APNEA: ICD-10-CM

## 2020-12-03 PROCEDURE — 99214 OFFICE O/P EST MOD 30 MIN: CPT | Performed by: INTERNAL MEDICINE

## 2020-12-03 NOTE — PROGRESS NOTES
"Subjective   Anoop Montenegro is a 70 y.o. male.  Patient presents with chief complaint of hypertension, hypothyroidism, degenerative joint disease of his neck with spinal canal stenosis of the cervical spine, Raynaud's disease is well controlled, episodic thrombocytopenia of unknown etiology, hyperlipidemia, and signs and symptoms of obstructive sleep apnea for the last several years here for follow-up evaluation treatment medication review and lab check.  I have also ordered a sleep study for this patient since his twin brother is already been diagnosed with severe sleep apnea and is on CPAP.      /88   Pulse 53   Temp 97.8 °F (36.6 °C)   Ht 177.8 cm (70\")   Wt 86.2 kg (190 lb)   SpO2 99%   BMI 27.26 kg/m²     Body mass index is 27.26 kg/m².    History of Present Illness signs and symptoms of obstructive sleep apnea for a significant period of time    The following portions of the patient's history were reviewed and updated as appropriate: allergies, current medications, past family history, past medical history, past social history, past surgical history and problem list.    Review of Systems   Constitutional: Negative.    HENT: Negative.    Cardiovascular: Negative.    Gastrointestinal: Negative.    Musculoskeletal: Positive for arthralgias and neck pain.   Neurological: Negative.    Psychiatric/Behavioral: Negative.        Objective   Physical Exam  Vitals signs and nursing note reviewed.   Constitutional:       Appearance: Normal appearance.   Neck:      Comments: Crepitance throughout range of motion  Cardiovascular:      Rate and Rhythm: Normal rate and regular rhythm.      Pulses: Normal pulses.      Heart sounds: Normal heart sounds.   Pulmonary:      Effort: Pulmonary effort is normal.      Breath sounds: Normal breath sounds.   Abdominal:      General: Abdomen is flat. Bowel sounds are normal.      Palpations: Abdomen is soft.   Musculoskeletal:      Comments: Advanced degenerative joint " disease and spinal canal stenosis of the cervical spine   Neurological:      General: No focal deficit present.      Mental Status: He is alert and oriented to person, place, and time.   Psychiatric:         Mood and Affect: Mood normal.         Behavior: Behavior normal.         Thought Content: Thought content normal.         Judgment: Judgment normal.           Assessment/Plan   Diagnoses and all orders for this visit:    1. Benign essential HTN (Primary)  Comments:  Well-controlled no changes needed at this time  Orders:  -     Comprehensive metabolic panel; Future    2. Acquired hypothyroidism  Comments:  I am going to check a TSH and T4 today  Orders:  -     TSH; Future  -     T4; Future    3. DDD (degenerative disc disease), cervical  Comments:  Continue daily stretching exercises    4. Raynaud's phenomenon without gangrene  Comments:  Patient is very cautious about keeping his core warm and was doing relatively well with his Raynaud's disease    5. Thrombocytopenia (CMS/HCC)  Comments:  I am going to check a CBC today  Orders:  -     CBC w AUTO Differential; Future    6. Cervical spinal stenosis  Comments:  No intervention other than stretching exercises and nonsteroidals as needed    7. Elevated lipoprotein(a)  Comments:  I have scheduled a CMP and lipid profile to be done  Orders:  -     Lipid Panel With LDL/HDL Ratio; Future    8. Screening for prostate cancer  Comments:  I am going to check a PSA today  Orders:  -     PSA SCREENING; Future    9. Obstructive sleep apnea  Comments:  I am going to schedule a sleep study  Orders:  -     Ambulatory Referral to Sleep Medicine                 Answers for HPI/ROS submitted by the patient on 11/26/2020   What is the primary reason for your visit?: Other  Please describe your symptoms.: Annual physical followup and required shots due.  Have you had these symptoms before?: Yes  How long have you been having these symptoms?: Greater than 2 weeks  Please list any  medications you are currently taking for this condition.: None  Please describe any probable cause for these symptoms. : Not applicable

## 2020-12-04 ENCOUNTER — LAB (OUTPATIENT)
Dept: INTERNAL MEDICINE | Facility: CLINIC | Age: 70
End: 2020-12-04

## 2020-12-04 DIAGNOSIS — Z12.5 SCREENING FOR PROSTATE CANCER: ICD-10-CM

## 2020-12-04 DIAGNOSIS — I10 BENIGN ESSENTIAL HTN: ICD-10-CM

## 2020-12-04 DIAGNOSIS — E78.41 ELEVATED LIPOPROTEIN(A): ICD-10-CM

## 2020-12-04 DIAGNOSIS — E03.9 ACQUIRED HYPOTHYROIDISM: ICD-10-CM

## 2020-12-04 DIAGNOSIS — D69.6 THROMBOCYTOPENIA (HCC): ICD-10-CM

## 2020-12-05 LAB
ALBUMIN SERPL-MCNC: 4 G/DL (ref 3.8–4.8)
ALBUMIN/GLOB SERPL: 1.5 {RATIO} (ref 1.2–2.2)
ALP SERPL-CCNC: 80 IU/L (ref 39–117)
ALT SERPL-CCNC: 20 IU/L (ref 0–44)
AST SERPL-CCNC: 20 IU/L (ref 0–40)
BASOPHILS # BLD AUTO: 0 X10E3/UL (ref 0–0.2)
BASOPHILS NFR BLD AUTO: 1 %
BILIRUB SERPL-MCNC: 0.5 MG/DL (ref 0–1.2)
BUN SERPL-MCNC: 17 MG/DL (ref 8–27)
BUN/CREAT SERPL: 14 (ref 10–24)
CALCIUM SERPL-MCNC: 9.1 MG/DL (ref 8.6–10.2)
CHLORIDE SERPL-SCNC: 104 MMOL/L (ref 96–106)
CHOLEST SERPL-MCNC: 130 MG/DL (ref 100–199)
CO2 SERPL-SCNC: 23 MMOL/L (ref 20–29)
CREAT SERPL-MCNC: 1.23 MG/DL (ref 0.76–1.27)
EOSINOPHIL # BLD AUTO: 0.2 X10E3/UL (ref 0–0.4)
EOSINOPHIL NFR BLD AUTO: 5 %
ERYTHROCYTE [DISTWIDTH] IN BLOOD BY AUTOMATED COUNT: 14.5 % (ref 11.6–15.4)
GLOBULIN SER CALC-MCNC: 2.7 G/DL (ref 1.5–4.5)
GLUCOSE SERPL-MCNC: 82 MG/DL (ref 65–99)
HCT VFR BLD AUTO: 46.6 % (ref 37.5–51)
HDLC SERPL-MCNC: 54 MG/DL
HGB BLD-MCNC: 15 G/DL (ref 13–17.7)
IMM GRANULOCYTES # BLD AUTO: 0 X10E3/UL (ref 0–0.1)
IMM GRANULOCYTES NFR BLD AUTO: 0 %
LDLC SERPL CALC-MCNC: 62 MG/DL (ref 0–99)
LDLC/HDLC SERPL: 1.1 RATIO (ref 0–3.6)
LYMPHOCYTES # BLD AUTO: 1.8 X10E3/UL (ref 0.7–3.1)
LYMPHOCYTES NFR BLD AUTO: 41 %
MCH RBC QN AUTO: 30.2 PG (ref 26.6–33)
MCHC RBC AUTO-ENTMCNC: 32.2 G/DL (ref 31.5–35.7)
MCV RBC AUTO: 94 FL (ref 79–97)
MONOCYTES # BLD AUTO: 0.4 X10E3/UL (ref 0.1–0.9)
MONOCYTES NFR BLD AUTO: 8 %
NEUTROPHILS # BLD AUTO: 2 X10E3/UL (ref 1.4–7)
NEUTROPHILS NFR BLD AUTO: 45 %
PLATELET # BLD AUTO: 309 X10E3/UL (ref 150–450)
POTASSIUM SERPL-SCNC: 5.2 MMOL/L (ref 3.5–5.2)
PROT SERPL-MCNC: 6.7 G/DL (ref 6–8.5)
PSA SERPL-MCNC: 2.2 NG/ML (ref 0–4)
RBC # BLD AUTO: 4.97 X10E6/UL (ref 4.14–5.8)
SODIUM SERPL-SCNC: 142 MMOL/L (ref 134–144)
T4 SERPL-MCNC: 5.5 UG/DL (ref 4.5–12)
TRIGL SERPL-MCNC: 69 MG/DL (ref 0–149)
TSH SERPL DL<=0.005 MIU/L-ACNC: 1.72 UIU/ML (ref 0.45–4.5)
VLDLC SERPL CALC-MCNC: 14 MG/DL (ref 5–40)
WBC # BLD AUTO: 4.5 X10E3/UL (ref 3.4–10.8)

## 2021-01-12 ENCOUNTER — APPOINTMENT (OUTPATIENT)
Dept: SLEEP MEDICINE | Facility: HOSPITAL | Age: 71
End: 2021-01-12

## 2021-02-08 ENCOUNTER — OFFICE VISIT (OUTPATIENT)
Dept: ORTHOPEDIC SURGERY | Facility: CLINIC | Age: 71
End: 2021-02-08

## 2021-02-08 VITALS — TEMPERATURE: 95.6 F | BODY MASS INDEX: 27.2 KG/M2 | WEIGHT: 190 LBS | HEIGHT: 70 IN

## 2021-02-08 DIAGNOSIS — M75.41 IMPINGEMENT SYNDROME OF RIGHT SHOULDER: Primary | ICD-10-CM

## 2021-02-08 PROCEDURE — 20610 DRAIN/INJ JOINT/BURSA W/O US: CPT | Performed by: ORTHOPAEDIC SURGERY

## 2021-02-08 RX ADMIN — METHYLPREDNISOLONE ACETATE 80 MG: 80 INJECTION, SUSPENSION INTRA-ARTICULAR; INTRALESIONAL; INTRAMUSCULAR; SOFT TISSUE at 11:51

## 2021-02-08 NOTE — PROGRESS NOTES
Patient: Anoop Montenegro  YOB: 1950  Date of Service: 2/8/2021    Chief Complaints: Right shoulder pain    Subjective:    History of Present Illness: Pt is seen in the office today with complaints of right shoulder pain I last saw him in October we injected him filling of his more rotator cuff he got great relief until recently felt like he did a lot over the holidays perhaps that decreased the longevity of his relief we talked about other means of management including other means of testing he would like to try 1 more injection.          Allergies: No Known Allergies    Medications:   Home Medications:  Current Outpatient Medications on File Prior to Visit   Medication Sig   • atorvastatin (LIPITOR) 20 MG tablet TAKE 1 TABLET DAILY   • irbesartan (AVAPRO) 300 MG tablet TAKE 1 TABLET DAILY   • levothyroxine (SYNTHROID) 112 MCG tablet Take 1 tablet by mouth Daily. Dose changed from 125 to 112 mcg. Please dis 125 mcg . Thanks   • meloxicam (MOBIC) 15 MG tablet Take 1 tablet by mouth Daily.   • zolpidem (AMBIEN) 10 MG tablet TAKE ONE TABLET BY MOUTH ONCE NIGHTLY AS NEEDED FOR SLEEP     No current facility-administered medications on file prior to visit.      Current Medications:  Scheduled Meds:  Continuous Infusions:No current facility-administered medications for this visit.     PRN Meds:.    I have reviewed the patient's medical history in detail and updated the computerized patient record.  Review and summarization of old records include:    Past Medical History:   Diagnosis Date   • Allergic rhinitis    • Arthritis    • Cancer (CMS/HCC)     skin   • Cervical spondylosis with myelopathy    • Elevated cholesterol    • FHx: colonic polyps    • GERD (gastroesophageal reflux disease)    • History of colonic polyps    • History of kidney stones    • Hyperlipidemia    • Hypertension     Benign Essential   • Hypothyroidism    • Insomnia    • Malaise and fatigue    • Pure hypercholesterolemia         Past  Surgical History:   Procedure Laterality Date   • COLONOSCOPY N/A 11/04/2011   • COLONOSCOPY N/A 06/2005   • COLONOSCOPY N/A 11/15/2013   • CYST REMOVAL  2014   • FRACTURE SURGERY      right thumb   • HAND SURGERY Left     3rd finger   • SKIN BIOPSY      scalp        Social History     Occupational History   • Occupation: RETIRED   Tobacco Use   • Smoking status: Never Smoker   • Smokeless tobacco: Never Used   Substance and Sexual Activity   • Alcohol use: Yes     Alcohol/week: 1.0 standard drinks     Types: 1 Shots of liquor per week     Comment: 3-5 times a week   • Drug use: Defer   • Sexual activity: Defer      Social History     Social History Narrative    LIVES WITH SPOUSE        Family History   Problem Relation Age of Onset   • Hypertension Mother    • Heart failure Mother    • Hearing loss Mother    • Hyperlipidemia Mother    • Aortic aneurysm Father    • Arthritis Father    • Prostate cancer Brother    • Heart disease Brother    • Hypertension Brother    • Liver cancer Sister    • Breast cancer Sister    • Cancer Sister    • Hyperlipidemia Sister    • Cancer Maternal Uncle    • Heart disease Maternal Uncle        ROS: 14 point review of systems was performed and was negative except for documented findings in HPI and today's encounter.     Allergies: No Known Allergies  Constitutional:  Denies fever, shaking or chills   Eyes:  Denies change in visual acuity   HENT:  Denies nasal congestion or sore throat   Respiratory:  Denies cough or shortness of breath   Cardiovascular:  Denies chest pain or severe LE edema   GI:  Denies abdominal pain, nausea, vomiting, bloody stools or diarrhea   Musculoskeletal:  Numbness, tingling, or loss of motor function only as noted above in history of present illness.  : Denies painful urination or hematuria  Integument:  Denies rash, lesion or ulceration   Neurologic:  Denies headache or focal weakness  Endocrine:  Denies lymphadenopathy  Psych:  Denies confusion or change  in mental status   Hem:  Denies active bleeding      Physical Exam: 70 y.o. male  Wt Readings from Last 3 Encounters:   12/03/20 86.2 kg (190 lb)   10/01/20 86.2 kg (190 lb 1.6 oz)   06/01/20 84.8 kg (187 lb)       There is no height or weight on file to calculate BMI.  No height and weight on file for this encounter.  There were no vitals filed for this visit.  Vital signs reviewed.   General Appearance:    Alert, cooperative, in no acute distress                    Ortho exam      Exam unchanged     .time    Assessment:   Right shoulder impingement  Plan: Injection if his symptoms return I would get an MRI  Follow up as indicated.  Ice, elevate, and rest as needed.  Discussed conservative measures of pain control including ice, bracing.  Also talked about the importance of strengthening and maintaining ideal body weight    Carlotta Mcduffie M.D.    Large Joint Arthrocentesis: R subacromial bursa  Date/Time: 2/8/2021 11:51 AM  Consent given by: patient  Site marked: site marked  Timeout: Immediately prior to procedure a time out was called to verify the correct patient, procedure, equipment, support staff and site/side marked as required   Supporting Documentation  Indications: pain   Procedure Details  Location: shoulder - R subacromial bursa  Preparation: Patient was prepped and draped in the usual sterile fashion  Needle size: 22 G  Approach: posterior  Medications administered: 4 mL lidocaine (cardiac); 80 mg methylPREDNISolone acetate 80 MG/ML  Patient tolerance: patient tolerated the procedure well with no immediate complications

## 2021-02-09 RX ORDER — METHYLPREDNISOLONE ACETATE 80 MG/ML
80 INJECTION, SUSPENSION INTRA-ARTICULAR; INTRALESIONAL; INTRAMUSCULAR; SOFT TISSUE
Status: COMPLETED | OUTPATIENT
Start: 2021-02-08 | End: 2021-02-08

## 2021-02-15 ENCOUNTER — TELEPHONE (OUTPATIENT)
Dept: INTERNAL MEDICINE | Facility: CLINIC | Age: 71
End: 2021-02-15

## 2021-02-15 DIAGNOSIS — I10 BENIGN ESSENTIAL HTN: Primary | ICD-10-CM

## 2021-02-15 NOTE — TELEPHONE ENCOUNTER
Caller: Anoop Montenegro    Relationship: Self    Best call back number: [phone]    Medication needed:   Requested Prescriptions     Pending Prescriptions Disp Refills   • irbesartan (AVAPRO) 300 MG tablet 90 tablet 1     Sig: Take 1 tablet by mouth Daily.       When do you need the refill by: ASAP    What details did the patient provide when requesting the medication: HAS 5 LEFT    Does the patient have less than a 3 day supply:  [] Yes  [x] No    What is the patient's preferred pharmacy: [address] [facility] AT [address] [phone] [facility] [phone]

## 2021-02-16 RX ORDER — IRBESARTAN 300 MG/1
300 TABLET ORAL DAILY
Qty: 90 TABLET | Refills: 1 | Status: SHIPPED | OUTPATIENT
Start: 2021-02-16 | End: 2021-05-11 | Stop reason: SDUPTHER

## 2021-03-17 RX ORDER — LEVOTHYROXINE SODIUM 112 UG/1
112 TABLET ORAL DAILY
Qty: 90 TABLET | Refills: 1 | Status: SHIPPED | OUTPATIENT
Start: 2021-03-17 | End: 2021-09-02

## 2021-03-17 NOTE — TELEPHONE ENCOUNTER
Caller: Anoop Montenegro    Relationship: Self    Best call back number: [phone]    Medication needed:   Requested Prescriptions     Pending Prescriptions Disp Refills   • levothyroxine (Synthroid) 112 MCG tablet 90 tablet 3     Sig: Take 1 tablet by mouth Daily. Dose changed from 125 to 112 mcg. Please dis 125 mcg . Thanks       When do you need the refill by: ASAP    PATIENT GAVE ME A DIFFERENT FAX NUMBER THEN LISTEN ON THIS [facility] [phone]      Does the patient have less than a 3 day supply:  [] Yes  [x] No    What is the patient's preferred pharmacy: [facility] - [facility] [address] [phone] [facility] [phone]

## 2021-03-19 ENCOUNTER — BULK ORDERING (OUTPATIENT)
Dept: CASE MANAGEMENT | Facility: OTHER | Age: 71
End: 2021-03-19

## 2021-03-19 DIAGNOSIS — Z23 IMMUNIZATION DUE: ICD-10-CM

## 2021-03-23 RX ORDER — ATORVASTATIN CALCIUM 20 MG/1
20 TABLET, FILM COATED ORAL DAILY
Qty: 30 TABLET | Refills: 0 | Status: CANCELLED | OUTPATIENT
Start: 2021-03-23

## 2021-03-23 RX ORDER — ATORVASTATIN CALCIUM 20 MG/1
20 TABLET, FILM COATED ORAL DAILY
Qty: 90 TABLET | Refills: 0 | Status: SHIPPED | OUTPATIENT
Start: 2021-03-23 | End: 2021-03-29 | Stop reason: SDUPTHER

## 2021-03-23 NOTE — TELEPHONE ENCOUNTER
Caller: Anoop Montenegro    Relationship: Self    Best call back number: [phone]   Medication needed:   Requested Prescriptions     Pending Prescriptions Disp Refills   • atorvastatin (LIPITOR) 20 MG tablet 90 tablet 1     Sig: Take 1 tablet by mouth Daily.       When do you need the refill by: ASAP    Does the patient have less than a 3 day supply:  [x] Yes  [] No    What is the patient's preferred pharmacy: [facility] - [address] [phone] [facility] [phone]

## 2021-03-29 RX ORDER — ATORVASTATIN CALCIUM 20 MG/1
20 TABLET, FILM COATED ORAL DAILY
Qty: 90 TABLET | Refills: 0 | Status: SHIPPED | OUTPATIENT
Start: 2021-03-29 | End: 2021-04-08 | Stop reason: SDUPTHER

## 2021-03-29 NOTE — TELEPHONE ENCOUNTER
Caller: Anoop Montenegro    Relationship: Self    Best call back number: [phone]    Medication needed:   Requested Prescriptions     Pending Prescriptions Disp Refills   • atorvastatin (LIPITOR) 20 MG tablet 90 tablet 0     Sig: Take 1 tablet by mouth Daily.       When do you need the refill by: 04/03/2021    What additional details did the patient provide when requesting the medication: PATIENT HAS A 5 DAY SUPPLY. PATIENT STATES PREVIOUS PRESCRIPTION WAS DENIED BUT HE HAS SPOKEN WITH VALENCIA GUTHRIE WHO HAS AGREED TO PROVIDE CARE FOR HIM. PLEASE CALL PATIENT IF PATIENT NEEDS TO BE SEEN BEFORE MEDS CAN BE REFILLED.     Does the patient have less than a 3 day supply:  [] Yes  [x] No    What is the patient's preferred pharmacy: [facility] - [place], [place] - [address] AT PORTAL TO REGISTERED [facility] - [phone]  - [phone] FX

## 2021-04-08 ENCOUNTER — TELEPHONE (OUTPATIENT)
Dept: INTERNAL MEDICINE | Facility: CLINIC | Age: 71
End: 2021-04-08

## 2021-04-08 RX ORDER — ATORVASTATIN CALCIUM 20 MG/1
20 TABLET, FILM COATED ORAL DAILY
Qty: 90 TABLET | Refills: 0 | Status: SHIPPED | OUTPATIENT
Start: 2021-04-08 | End: 2021-05-10 | Stop reason: SDUPTHER

## 2021-04-08 NOTE — TELEPHONE ENCOUNTER
Caller: Anoop Montenegro    Relationship: Self    Best call back number: [phone]     Medication needed:   Requested Prescriptions     Pending Prescriptions Disp Refills   • atorvastatin (LIPITOR) 20 MG tablet 90 tablet 0     Sig: Take 1 tablet by mouth Daily.       When do you need the refill by: ASAP  What additional details did the patient provide when requesting the medication: PATIENT IS OUT OF MEDICATION .    Does the patient have less than a 3 day supply:  [x] Yes  [] No    What is the patient's [facility] [address] [phone] [facility] [phone]

## 2021-05-10 RX ORDER — ATORVASTATIN CALCIUM 20 MG/1
20 TABLET, FILM COATED ORAL DAILY
Qty: 90 TABLET | Refills: 0 | Status: SHIPPED | OUTPATIENT
Start: 2021-05-10 | End: 2021-12-03

## 2021-05-10 NOTE — TELEPHONE ENCOUNTER
Caller: Yonasgraceyoung Anoop    Relationship: Self    Best call back number: [phone]    Medication needed:   Requested Prescriptions     Pending Prescriptions Disp Refills   • atorvastatin (LIPITOR) 20 MG tablet 90 tablet 0     Sig: Take 1 tablet by mouth Daily.       When do you need the refill by: ASAP    What additional details did the patient provide when requesting the medication:PATIENT STATES THAT THE PHARMACY HAS TRIED TO FAX AN ORDER OVER 3 TIMES FOR THIS MEDICATION.  PATIENT WILL BE SEEING VALENCIA GUTHRIE IN June AND NEEDS THIS REFILLED UNTIL HE SEES HER.      Does the patient have less than a 3 day supply:  [] Yes  [x] No    What is the patient's preferred pharmacy: [facility] [place] - [address] AT PORTAL TO [facility] - [phone]  - [phone] FX              Detail Level: Detailed Detail Level: Generalized

## 2021-05-11 ENCOUNTER — TELEPHONE (OUTPATIENT)
Dept: INTERNAL MEDICINE | Facility: CLINIC | Age: 71
End: 2021-05-11

## 2021-05-11 DIAGNOSIS — I10 BENIGN ESSENTIAL HTN: ICD-10-CM

## 2021-05-11 RX ORDER — IRBESARTAN 300 MG/1
300 TABLET ORAL DAILY
Qty: 90 TABLET | Refills: 1 | Status: SHIPPED | OUTPATIENT
Start: 2021-05-11 | End: 2021-10-25

## 2021-06-03 ENCOUNTER — APPOINTMENT (OUTPATIENT)
Dept: WOMENS IMAGING | Facility: HOSPITAL | Age: 71
End: 2021-06-03

## 2021-06-03 ENCOUNTER — OFFICE VISIT (OUTPATIENT)
Dept: INTERNAL MEDICINE | Facility: CLINIC | Age: 71
End: 2021-06-03

## 2021-06-03 VITALS
WEIGHT: 189.9 LBS | TEMPERATURE: 97.8 F | HEIGHT: 70 IN | RESPIRATION RATE: 16 BRPM | BODY MASS INDEX: 27.19 KG/M2 | DIASTOLIC BLOOD PRESSURE: 78 MMHG | SYSTOLIC BLOOD PRESSURE: 110 MMHG | OXYGEN SATURATION: 99 % | HEART RATE: 56 BPM

## 2021-06-03 DIAGNOSIS — E78.00 PURE HYPERCHOLESTEROLEMIA: ICD-10-CM

## 2021-06-03 DIAGNOSIS — I73.00 RAYNAUD'S PHENOMENON WITHOUT GANGRENE: ICD-10-CM

## 2021-06-03 DIAGNOSIS — G89.29 CHRONIC BILATERAL LOW BACK PAIN WITHOUT SCIATICA: ICD-10-CM

## 2021-06-03 DIAGNOSIS — I10 BENIGN ESSENTIAL HTN: Primary | ICD-10-CM

## 2021-06-03 DIAGNOSIS — R05.9 COUGH: ICD-10-CM

## 2021-06-03 DIAGNOSIS — E03.9 ACQUIRED HYPOTHYROIDISM: Chronic | ICD-10-CM

## 2021-06-03 DIAGNOSIS — M54.50 CHRONIC BILATERAL LOW BACK PAIN WITHOUT SCIATICA: ICD-10-CM

## 2021-06-03 PROBLEM — D69.6 THROMBOCYTOPENIA (HCC): Status: RESOLVED | Noted: 2019-07-01 | Resolved: 2021-06-03

## 2021-06-03 PROBLEM — R50.9 FEVER AND CHILLS: Status: RESOLVED | Noted: 2019-07-01 | Resolved: 2021-06-03

## 2021-06-03 PROBLEM — G47.00 INSOMNIA: Chronic | Status: ACTIVE | Noted: 2020-06-01

## 2021-06-03 PROBLEM — D72.819 LEUKOPENIA: Status: RESOLVED | Noted: 2019-07-01 | Resolved: 2021-06-03

## 2021-06-03 LAB
ALBUMIN SERPL-MCNC: 4.5 G/DL (ref 3.5–5.2)
ALBUMIN/GLOB SERPL: 2 G/DL
ALP SERPL-CCNC: 73 U/L (ref 39–117)
ALT SERPL-CCNC: 21 U/L (ref 1–41)
AST SERPL-CCNC: 41 U/L (ref 1–40)
BASOPHILS # BLD AUTO: 0.04 10*3/MM3 (ref 0–0.2)
BASOPHILS NFR BLD AUTO: 1 % (ref 0–1.5)
BILIRUB SERPL-MCNC: 0.4 MG/DL (ref 0–1.2)
BUN SERPL-MCNC: 16 MG/DL (ref 8–23)
BUN/CREAT SERPL: 12.8 (ref 7–25)
CALCIUM SERPL-MCNC: 10 MG/DL (ref 8.6–10.5)
CHLORIDE SERPL-SCNC: 105 MMOL/L (ref 98–107)
CHOLEST SERPL-MCNC: 133 MG/DL (ref 0–200)
CO2 SERPL-SCNC: 28.2 MMOL/L (ref 22–29)
CREAT SERPL-MCNC: 1.25 MG/DL (ref 0.76–1.27)
EOSINOPHIL # BLD AUTO: 0.31 10*3/MM3 (ref 0–0.4)
EOSINOPHIL NFR BLD AUTO: 7.7 % (ref 0.3–6.2)
ERYTHROCYTE [DISTWIDTH] IN BLOOD BY AUTOMATED COUNT: 13.2 % (ref 12.3–15.4)
GLOBULIN SER CALC-MCNC: 2.2 GM/DL
GLUCOSE SERPL-MCNC: 91 MG/DL (ref 65–99)
HCT VFR BLD AUTO: 43.4 % (ref 37.5–51)
HDLC SERPL-MCNC: 52 MG/DL (ref 40–60)
HGB BLD-MCNC: 14.2 G/DL (ref 13–17.7)
IMM GRANULOCYTES # BLD AUTO: 0.01 10*3/MM3 (ref 0–0.05)
IMM GRANULOCYTES NFR BLD AUTO: 0.2 % (ref 0–0.5)
LDLC SERPL CALC-MCNC: 65 MG/DL (ref 0–100)
LYMPHOCYTES # BLD AUTO: 1.57 10*3/MM3 (ref 0.7–3.1)
LYMPHOCYTES NFR BLD AUTO: 38.9 % (ref 19.6–45.3)
MCH RBC QN AUTO: 31.1 PG (ref 26.6–33)
MCHC RBC AUTO-ENTMCNC: 32.7 G/DL (ref 31.5–35.7)
MCV RBC AUTO: 95.2 FL (ref 79–97)
MONOCYTES # BLD AUTO: 0.42 10*3/MM3 (ref 0.1–0.9)
MONOCYTES NFR BLD AUTO: 10.4 % (ref 5–12)
NEUTROPHILS # BLD AUTO: 1.69 10*3/MM3 (ref 1.7–7)
NEUTROPHILS NFR BLD AUTO: 41.8 % (ref 42.7–76)
NRBC BLD AUTO-RTO: 0 /100 WBC (ref 0–0.2)
PLATELET # BLD AUTO: 347 10*3/MM3 (ref 140–450)
POTASSIUM SERPL-SCNC: 4.4 MMOL/L (ref 3.5–5.2)
PROT SERPL-MCNC: 6.7 G/DL (ref 6–8.5)
RBC # BLD AUTO: 4.56 10*6/MM3 (ref 4.14–5.8)
SODIUM SERPL-SCNC: 140 MMOL/L (ref 136–145)
TRIGL SERPL-MCNC: 85 MG/DL (ref 0–150)
VLDLC SERPL CALC-MCNC: 16 MG/DL (ref 5–40)
WBC # BLD AUTO: 4.04 10*3/MM3 (ref 3.4–10.8)

## 2021-06-03 PROCEDURE — 71046 X-RAY EXAM CHEST 2 VIEWS: CPT | Performed by: NURSE PRACTITIONER

## 2021-06-03 PROCEDURE — 71046 X-RAY EXAM CHEST 2 VIEWS: CPT | Performed by: RADIOLOGY

## 2021-06-03 PROCEDURE — G0439 PPPS, SUBSEQ VISIT: HCPCS | Performed by: NURSE PRACTITIONER

## 2021-06-03 PROCEDURE — 99214 OFFICE O/P EST MOD 30 MIN: CPT | Performed by: NURSE PRACTITIONER

## 2021-06-03 RX ORDER — MELOXICAM 15 MG/1
15 TABLET ORAL DAILY PRN
Qty: 30 TABLET | Refills: 4
Start: 2021-06-03 | End: 2021-12-06 | Stop reason: SDUPTHER

## 2021-06-03 NOTE — PROGRESS NOTES
The ABCs of the Annual Wellness Visit  Subsequent Medicare Wellness Visit    Chief Complaint   Patient presents with   • Establish Care   • Medicare Wellness-subsequent   • Hypertension   • Hyperlipidemia   • Hypothyroidism       Subjective   History of Present Illness:  Anoop Montenegro is a 70 y.o. male who presents for a Subsequent Medicare Wellness Visit.    HEALTH RISK ASSESSMENT    Recent Hospitalizations:  No hospitalization(s) within the last year.    Current Medical Providers:  Patient Care Team:  Cathy Muhammad APRN as PCP - General (Internal Medicine)  Jomar Vieira MD as Referring Physician (Hospitalist)  Elise Johnson MD as Consulting Physician (Hematology and Oncology)    Smoking Status:  Social History     Tobacco Use   Smoking Status Never Smoker   Smokeless Tobacco Never Used       Alcohol Consumption:  Social History     Substance and Sexual Activity   Alcohol Use Yes   • Alcohol/week: 1.0 standard drinks   • Types: 1 Shots of liquor per week    Comment: 3-5 times a week       Depression Screen:   PHQ-2/PHQ-9 Depression Screening 6/3/2021   Little interest or pleasure in doing things 0   Feeling down, depressed, or hopeless 0   Trouble falling or staying asleep, or sleeping too much 0   Feeling tired or having little energy 0   Poor appetite or overeating 0   Feeling bad about yourself - or that you are a failure or have let yourself or your family down 0   Trouble concentrating on things, such as reading the newspaper or watching television 0   Moving or speaking so slowly that other people could have noticed. Or the opposite - being so fidgety or restless that you have been moving around a lot more than usual 0   Thoughts that you would be better off dead, or of hurting yourself in some way 0   Total Score 0       Fall Risk Screen:  STEADI Fall Risk Assessment has not been completed.    Health Habits and Functional and Cognitive Screening:  Functional & Cognitive Status 6/3/2021    Do you have difficulty preparing food and eating? No   Do you have difficulty bathing yourself, getting dressed or grooming yourself? No   Do you have difficulty using the toilet? No   Do you have difficulty moving around from place to place? No   Do you have trouble with steps or getting out of a bed or a chair? No   Current Diet Well Balanced Diet   Dental Exam Up to date   Eye Exam Up to date   Exercise (times per week) 2 times per week   Current Exercises Include Walking   Do you need help using the phone?  No   Are you deaf or do you have serious difficulty hearing?  No   Do you need help with transportation? No   Do you need help shopping? No   Do you need help preparing meals?  No   Do you need help with housework?  No   Do you need help with laundry? No   Do you need help taking your medications? No   Do you need help managing money? No   Do you ever drive or ride in a car without wearing a seat belt? No   Have you felt unusual stress, anger or loneliness in the last month? No   Who do you live with? Spouse   If you need help, do you have trouble finding someone available to you? No   Have you been bothered in the last four weeks by sexual problems? No   Do you have difficulty concentrating, remembering or making decisions? No         Does the patient have evidence of cognitive impairment? No    Asprin use counseling:Does not need ASA (and currently is not on it)    Age-appropriate Screening Schedule:  Refer to the list below for future screening recommendations based on patient's age, sex and/or medical conditions. Orders for these recommended tests are listed in the plan section. The patient has been provided with a written plan.    Health Maintenance   Topic Date Due   • INFLUENZA VACCINE  08/01/2021   • LIPID PANEL  12/04/2021   • TDAP/TD VACCINES (3 - Td or Tdap) 05/13/2031   • ZOSTER VACCINE  Completed          The following portions of the patient's history were reviewed and updated as appropriate:  "allergies, current medications, past family history, past medical history, past social history, past surgical history and problem list.    Outpatient Medications Prior to Visit   Medication Sig Dispense Refill   • atorvastatin (LIPITOR) 20 MG tablet Take 1 tablet by mouth Daily. 90 tablet 0   • irbesartan (AVAPRO) 300 MG tablet Take 1 tablet by mouth Daily. 90 tablet 1   • levothyroxine (SYNTHROID, LEVOTHROID) 112 MCG tablet Take 1 tablet by mouth Daily. Dose changed from 125 to 112 mcg. Please dis 125 mcg . Thanks 90 tablet 1   • zolpidem (AMBIEN) 10 MG tablet TAKE ONE TABLET BY MOUTH ONCE NIGHTLY AS NEEDED FOR SLEEP 30 tablet 0   • meloxicam (MOBIC) 15 MG tablet Take 1 tablet by mouth Daily. 30 tablet 4     No facility-administered medications prior to visit.       Patient Active Problem List   Diagnosis   • Cervical spinal stenosis   • DDD (degenerative disc disease), cervical   • Benign essential HTN   • Cough   • Raynaud's phenomenon without gangrene   • Chronic bilateral low back pain without sciatica   • Hypothyroidism   • Insomnia   • Hyperlipidemia       Advanced Care Planning:  ACP discussion was held with the patient during this visit. Patient has an advance directive (not in EMR), copy requested.    Review of Systems    Compared to one year ago, the patient feels his physical health is the same. (although can feel as he is getting older, not as active over past year due to pandemic)  Compared to one year ago, the patient feels his mental health is the same.    Reviewed chart for potential of high risk medication in the elderly: yes  Reviewed chart for potential of harmful drug interactions in the elderly:yes    Objective         Vitals:    06/03/21 0825   BP: 110/78   BP Location: Left arm   Patient Position: Sitting   Cuff Size: Adult   Pulse: 56   Resp: 16   Temp: 97.8 °F (36.6 °C)   TempSrc: Temporal   SpO2: 99%   Weight: 86.1 kg (189 lb 14.4 oz)   Height: 177.8 cm (70\")       Body mass index is 27.25 " kg/m².  Discussed the patient's BMI with him. The BMI is in the acceptable range.    Physical Exam          Assessment/Plan   Medicare Risks and Personalized Health Plan  CMS Preventative Services Quick Reference  Immunizations Discussed/Encouraged (specific immunizations; Pneumococcal 23 )    The above risks/problems have been discussed with the patient.  Pertinent information has been shared with the patient in the After Visit Summary.  Follow up plans and orders are seen below in the Assessment/Plan Section.    Diagnoses and all orders for this visit:    1. Benign essential HTN (Primary)  Assessment & Plan:  Hypertension is unchanged.  Continue current treatment regimen.  Dietary sodium restriction.  Blood pressure will be reassessed at the next regular appointment.    Orders:  -     Comprehensive Metabolic Panel  -     CBC & Differential    2. Raynaud's phenomenon without gangrene  Assessment & Plan:  C/o intermittent discoloration and discomfort of distal tips of fingers, declines CCB therapy for now      3. Pure hypercholesterolemia  Assessment & Plan:  Lipid abnormalities are unchanged.  Pharmacotherapy as ordered.  Lipids will be reassessed in 6 months.    Orders:  -     Lipid Panel    4. Cough  Assessment & Plan:  No acute abnl noted on CXR (will send to radiology for review). Lacy appearance noted on distal phalanx of middle finger, possibly due to sarcoidosis. Will consider further evaluation pending results of CXR.    Orders:  -     XR Chest 2 View    5. Chronic bilateral low back pain without sciatica  Assessment & Plan:  Takes Meloxicam as needed (wilmer 3x per week)    Orders:  -     meloxicam (MOBIC) 15 MG tablet; Take 1 tablet by mouth Daily As Needed for Moderate Pain .  Dispense: 30 tablet; Refill: 4    6. Acquired hypothyroidism  Assessment & Plan:  Currently managed on Synthroid, recheck TSH      Follow Up:  Return in about 6 months (around 12/3/2021).     An After Visit Summary and PPPS were given  to the patient.

## 2021-06-03 NOTE — PATIENT INSTRUCTIONS
Medicare Wellness  Personal Prevention Plan of Service     Date of Office Visit:  2021  Encounter Provider:  DONNIE Narayan  Place of Service:  [facility]  Patient Name: Anoop Montenegro  :  1950    As part of the Medicare Wellness portion of your visit today, we are providing you with this personalized preventive plan of services (PPPS). This plan is based upon recommendations of the [place] Preventive Services Task Force (USPSTF) and the Advisory Committee on Immunization Practices (ACIP).    This lists the preventive care services that should be considered, and provides dates of when you are due. Items listed as completed are up-to-date and do not require any further intervention.    Health Maintenance   Topic Date Due   • ANNUAL WELLNESS VISIT  10/10/2017   • Pneumococcal Vaccine 65+ (2 of 2 - PPSV23) 2018   • INFLUENZA VACCINE  2021   • LIPID PANEL  2021   • COLORECTAL CANCER SCREENING  2022   • TDAP/TD VACCINES (3 - Td or Tdap) 2031   • HEPATITIS C SCREENING  Completed   • COVID-19 Vaccine  Completed   • ZOSTER VACCINE  Completed       Orders Placed This Encounter   Procedures   • XR Chest 2 View     Scheduling Instructions:      Pt may check out after CXR     Order Specific Question:   Reason for Exam:     Answer:   cough, lacy appearance of mid finger on left hand xray--rule out sarcoidosis   • Comprehensive Metabolic Panel     Order Specific Question:   Release to patient     Answer:   Immediate   • Lipid Panel   • CBC & Differential     Order Specific Question:   Manual Differential     Answer:   No       Return in about 6 months (around 12/3/2021).

## 2021-06-03 NOTE — ASSESSMENT & PLAN NOTE
No acute abnl noted on CXR (will send to radiology for review). Lacy appearance noted on distal phalanx of middle finger, possibly due to sarcoidosis. Will consider further evaluation pending results of CXR.

## 2021-06-03 NOTE — PROGRESS NOTES
"Chief Complaint  Establish Care, Medicare Wellness-subsequent, Hypertension, Hyperlipidemia, and Hypothyroidism    Subjective          Anoop Montenegro presents to [facility] to establish care and to f/u on HTN, hyperlipidemia and hypothyroidism.    Patient presents to establish care. Previous medical history discussed with patient in details and reflected in problem list.  He was using a circular saw 5/14 when his finger got hit with the saw blade. He received a Td at [facility] and was referred to hand ortho. Wound is steadily improving. His xray of the right hand revealed lacy appearance of distal end of phalanx of middle finger, possibly related to sarcoidosis. No abnl noted on CXR and CTA of chest in 2019.  He does note intermittent belching without abdominal pain.      Objective   Vital Signs:   /78 (BP Location: Left arm, Patient Position: Sitting, Cuff Size: Adult)   Pulse 56   Temp 97.8 °F (36.6 °C) (Temporal)   Resp 16   Ht 177.8 cm (70\")   Wt 86.1 kg (189 lb 14.4 oz)   SpO2 99%   BMI 27.25 kg/m²     Physical Exam  Constitutional:       Appearance: He is well-developed. He is not ill-appearing.   HENT:      Head: Normocephalic.      Right Ear: Hearing, tympanic membrane and external ear normal.      Left Ear: Hearing, tympanic membrane and external ear normal.      Nose: Nose normal. No nasal deformity, mucosal edema or rhinorrhea.      Right Sinus: No maxillary sinus tenderness or frontal sinus tenderness.      Left Sinus: No maxillary sinus tenderness or frontal sinus tenderness.      Mouth/Throat:      Dentition: Normal dentition.   Eyes:      General: Lids are normal.         Right eye: No discharge.         Left eye: No discharge.      Conjunctiva/sclera: Conjunctivae normal.      Right eye: No exudate.     Left eye: No exudate.  Neck:      Thyroid: No thyroid mass or thyromegaly.      Vascular: No carotid bruit.      Trachea: Trachea normal.   Cardiovascular:      " Rate and Rhythm: Regular rhythm.      Pulses: Normal pulses.      Heart sounds: Normal heart sounds. No murmur heard.     Pulmonary:      Effort: No respiratory distress.      Breath sounds: Normal breath sounds. No decreased breath sounds, wheezing, rhonchi or rales.   Abdominal:      General: Bowel sounds are normal.      Palpations: Abdomen is soft.      Tenderness: There is no abdominal tenderness.   Musculoskeletal:      Cervical back: Normal range of motion. No edema.      Comments: Eschar without erythema and/or drainage from distal end of left middle finger   Lymphadenopathy:      Head:      Right side of head: No submental, submandibular, tonsillar, preauricular, posterior auricular or occipital adenopathy.      Left side of head: No submental, submandibular, tonsillar, preauricular, posterior auricular or occipital adenopathy.   Skin:     General: Skin is warm and dry.      Nails: There is no clubbing.   Neurological:      Mental Status: He is alert.   Psychiatric:         Behavior: Behavior is cooperative.        Result Review :   The following data was reviewed by: DONNIE Narayan on 06/03/2021:  Common labs    Common Labsle 12/4/20 12/4/20 12/4/20 12/4/20    1140 1140 1140 1140   Glucose    82   BUN    17   Creatinine    1.23   eGFR Non  Am    60   eGFR African Am    69   Sodium    142   Potassium    5.2   Chloride    104   Calcium    9.1   Total Protein    6.7   Albumin    4.0   Total Bilirubin    0.5   Alkaline Phosphatase    80   AST (SGOT)    20   ALT (SGPT)    20   WBC   4.5    Hemoglobin   15.0    Hematocrit   46.6    Platelets   309    Total Cholesterol 130      Triglycerides 69      HDL Cholesterol 54      LDL Cholesterol  62      PSA  2.2        Comments are available for some flowsheets but are not being displayed.           Data reviewed: Consultant notes hand ortho          Assessment and Plan    Diagnoses and all orders for this visit:    1. Benign essential HTN  (Primary)  Assessment & Plan:  Hypertension is unchanged.  Continue current treatment regimen.  Dietary sodium restriction.  Blood pressure will be reassessed at the next regular appointment.    Orders:  -     Comprehensive Metabolic Panel  -     CBC & Differential    2. Raynaud's phenomenon without gangrene  Assessment & Plan:  C/o intermittent discoloration and discomfort of distal tips of fingers, declines CCB therapy for now      3. Pure hypercholesterolemia  Assessment & Plan:  Lipid abnormalities are unchanged.  Pharmacotherapy as ordered.  Lipids will be reassessed in 6 months.    Orders:  -     Lipid Panel    4. Cough  Assessment & Plan:  No acute abnl noted on CXR (will send to radiology for review). Lacy appearance noted on distal phalanx of middle finger, possibly due to sarcoidosis. Will consider further evaluation pending results of CXR.    Orders:  -     XR Chest 2 View    5. Chronic bilateral low back pain without sciatica  Assessment & Plan:  Takes Meloxicam as needed (wilmer 3x per week)    Orders:  -     meloxicam (MOBIC) 15 MG tablet; Take 1 tablet by mouth Daily As Needed for Moderate Pain .  Dispense: 30 tablet; Refill: 4    6. Acquired hypothyroidism  Assessment & Plan:  Currently managed on Synthroid, recheck TSH    He c/o intermittent belching without abdominal pain, he may take Tums if needed but consider further evaluation if sx persist or worsen (discussed).      Follow Up   Return in about 6 months (around 12/3/2021).  Patient was given instructions and counseling regarding his condition or for health maintenance advice. Please see specific information pulled into the AVS if appropriate.

## 2021-06-03 NOTE — ASSESSMENT & PLAN NOTE
C/o intermittent discoloration and discomfort of distal tips of fingers, declines CCB therapy for now

## 2021-06-03 NOTE — PROGRESS NOTES
"Subjective   Anoop Montenegro is a 70 y.o. male.         History of Present Illness     The following portions of the patient's history were reviewed and updated as appropriate: allergies, current medications, past social history and problem list.    Past Medical History:   Diagnosis Date   • Allergic rhinitis    • Arthritis    • Cancer (CMS/HCC)     skin   • Cervical spondylosis with myelopathy    • Elevated cholesterol    • FHx: colonic polyps    • GERD (gastroesophageal reflux disease)    • History of colonic polyps    • History of kidney stones    • Hyperlipidemia    • Hypertension     Benign Essential   • Hypothyroidism    • Insomnia    • Malaise and fatigue    • Pure hypercholesterolemia          Current Outpatient Medications:   •  atorvastatin (LIPITOR) 20 MG tablet, Take 1 tablet by mouth Daily., Disp: 90 tablet, Rfl: 0  •  irbesartan (AVAPRO) 300 MG tablet, Take 1 tablet by mouth Daily., Disp: 90 tablet, Rfl: 1  •  levothyroxine (SYNTHROID, LEVOTHROID) 112 MCG tablet, Take 1 tablet by mouth Daily. Dose changed from 125 to 112 mcg. Please dis 125 mcg . Thanks, Disp: 90 tablet, Rfl: 1  •  meloxicam (MOBIC) 15 MG tablet, Take 1 tablet by mouth Daily As Needed for Moderate Pain ., Disp: 30 tablet, Rfl: 4  •  zolpidem (AMBIEN) 10 MG tablet, TAKE ONE TABLET BY MOUTH ONCE NIGHTLY AS NEEDED FOR SLEEP, Disp: 30 tablet, Rfl: 0    No Known Allergies    Review of Systems    Objective   Vitals:    06/03/21 0825   BP: 110/78   BP Location: Left arm   Patient Position: Sitting   Cuff Size: Adult   Pulse: 56   Resp: 16   Temp: 97.8 °F (36.6 °C)   TempSrc: Temporal   SpO2: 99%   Weight: 86.1 kg (189 lb 14.4 oz)   Height: 177.8 cm (70\")     Body mass index is 27.25 kg/m².  Physical Exam    Assessment/Plan   Diagnoses and all orders for this visit:    1. Benign essential HTN (Primary)  -     Comprehensive Metabolic Panel  -     CBC & Differential    2. Raynaud's phenomenon without gangrene    3. Pure hypercholesterolemia  -    "  Lipid Panel    4. Cough  -     XR Chest 2 View    5. Chronic bilateral low back pain without sciatica  -     meloxicam (MOBIC) 15 MG tablet; Take 1 tablet by mouth Daily As Needed for Moderate Pain .  Dispense: 30 tablet; Refill: 4

## 2021-09-02 RX ORDER — LEVOTHYROXINE SODIUM 112 UG/1
TABLET ORAL
Qty: 90 TABLET | Refills: 1 | Status: SHIPPED | OUTPATIENT
Start: 2021-09-02 | End: 2022-03-01

## 2021-10-12 DIAGNOSIS — F51.01 PRIMARY INSOMNIA: ICD-10-CM

## 2021-10-12 RX ORDER — ZOLPIDEM TARTRATE 10 MG/1
10 TABLET ORAL NIGHTLY PRN
Qty: 30 TABLET | Refills: 0 | Status: SHIPPED | OUTPATIENT
Start: 2021-10-12 | End: 2021-12-13

## 2021-10-12 NOTE — TELEPHONE ENCOUNTER
----- Message from Anoop Montenegro sent at 10/11/2021 10:22 PM EDT -----  Regarding: Prescription Question  Contact: [phone]  Please send in prescription for zolpidem to [facility] in [place]. [phone].  Thank you.

## 2021-10-25 DIAGNOSIS — I10 BENIGN ESSENTIAL HTN: ICD-10-CM

## 2021-10-25 RX ORDER — IRBESARTAN 300 MG/1
TABLET ORAL
Qty: 90 TABLET | Refills: 1 | Status: SHIPPED | OUTPATIENT
Start: 2021-10-25 | End: 2022-04-25

## 2021-12-03 RX ORDER — ATORVASTATIN CALCIUM 20 MG/1
TABLET, FILM COATED ORAL
Qty: 90 TABLET | Refills: 3 | Status: SHIPPED | OUTPATIENT
Start: 2021-12-03 | End: 2022-12-30 | Stop reason: SDUPTHER

## 2021-12-06 ENCOUNTER — OFFICE VISIT (OUTPATIENT)
Dept: INTERNAL MEDICINE | Facility: CLINIC | Age: 71
End: 2021-12-06

## 2021-12-06 VITALS
WEIGHT: 193 LBS | TEMPERATURE: 97.5 F | HEART RATE: 71 BPM | DIASTOLIC BLOOD PRESSURE: 84 MMHG | OXYGEN SATURATION: 96 % | HEIGHT: 70 IN | BODY MASS INDEX: 27.63 KG/M2 | SYSTOLIC BLOOD PRESSURE: 134 MMHG

## 2021-12-06 DIAGNOSIS — E03.9 ACQUIRED HYPOTHYROIDISM: Chronic | ICD-10-CM

## 2021-12-06 DIAGNOSIS — Z12.11 SCREENING FOR COLON CANCER: ICD-10-CM

## 2021-12-06 DIAGNOSIS — M54.50 CHRONIC BILATERAL LOW BACK PAIN WITHOUT SCIATICA: ICD-10-CM

## 2021-12-06 DIAGNOSIS — G89.29 CHRONIC BILATERAL LOW BACK PAIN WITHOUT SCIATICA: ICD-10-CM

## 2021-12-06 DIAGNOSIS — R20.2 PARESTHESIAS: ICD-10-CM

## 2021-12-06 DIAGNOSIS — I10 BENIGN ESSENTIAL HTN: Primary | ICD-10-CM

## 2021-12-06 DIAGNOSIS — E78.00 PURE HYPERCHOLESTEROLEMIA: Chronic | ICD-10-CM

## 2021-12-06 DIAGNOSIS — Z12.5 SCREENING FOR PROSTATE CANCER: ICD-10-CM

## 2021-12-06 PROCEDURE — 99214 OFFICE O/P EST MOD 30 MIN: CPT | Performed by: NURSE PRACTITIONER

## 2021-12-06 RX ORDER — MELOXICAM 15 MG/1
15 TABLET ORAL DAILY PRN
Qty: 90 TABLET | Refills: 1 | Status: SHIPPED | OUTPATIENT
Start: 2021-12-06

## 2021-12-06 NOTE — PROGRESS NOTES
"Chief Complaint  Hypertension, Hyperlipidemia, and Hypothyroidism    Subjective          Anoop Montenegro presents to [facility] for f/u regarding HTN, hyperlipidemia and hypothyroidism.    He c/o intermittent tingling in bilateral hands, right > left. He has a hx of spinal stenosis with neck pain and stiffness. Sx are worse at night and with driving long distances.      Hypertension  This is a recurrent problem. The current episode started more than 1 year ago. The problem has been gradually improving since onset. The problem is controlled. Agents associated with hypertension include thyroid hormones. Risk factors for coronary artery disease include dyslipidemia and family history. There are no compliance problems.        Objective   Vital Signs:   /84 (BP Location: Left arm, Patient Position: Sitting, Cuff Size: Adult)   Pulse 71   Temp 97.5 °F (36.4 °C) (Temporal)   Ht 177.8 cm (70\")   Wt 87.5 kg (193 lb)   SpO2 96%   BMI 27.69 kg/m²     Physical Exam  Constitutional:       Appearance: He is well-developed. He is not ill-appearing.   HENT:      Head: Normocephalic.      Right Ear: Hearing, tympanic membrane and external ear normal.      Left Ear: Hearing, tympanic membrane and external ear normal.      Nose: Nose normal. No nasal deformity, mucosal edema or rhinorrhea.      Right Sinus: No maxillary sinus tenderness or frontal sinus tenderness.      Left Sinus: No maxillary sinus tenderness or frontal sinus tenderness.      Mouth/Throat:      Dentition: Normal dentition.   Eyes:      General: Lids are normal.         Right eye: No discharge.         Left eye: No discharge.      Conjunctiva/sclera: Conjunctivae normal.      Right eye: No exudate.     Left eye: No exudate.  Neck:      Thyroid: No thyroid mass or thyromegaly.      Vascular: No carotid bruit.      Trachea: Trachea normal.   Cardiovascular:      Rate and Rhythm: Regular rhythm.      Pulses: Normal pulses.      " Heart sounds: Normal heart sounds. No murmur heard.      Pulmonary:      Effort: No respiratory distress.      Breath sounds: Normal breath sounds. No decreased breath sounds, wheezing, rhonchi or rales.   Abdominal:      General: Bowel sounds are normal.      Palpations: Abdomen is soft.      Tenderness: There is no abdominal tenderness.   Musculoskeletal:      Cervical back: Normal range of motion. No edema.   Lymphadenopathy:      Head:      Right side of head: No submental, submandibular, tonsillar, preauricular, posterior auricular or occipital adenopathy.      Left side of head: No submental, submandibular, tonsillar, preauricular, posterior auricular or occipital adenopathy.   Skin:     General: Skin is warm and dry.      Nails: There is no clubbing.   Neurological:      Mental Status: He is alert.   Psychiatric:         Behavior: Behavior is cooperative.        Result Review :   The following data was reviewed by: DONNIE Narayan on 12/06/2021:  Common labs    Common Labsle 6/3/21 6/3/21 6/3/21 12/6/21 12/6/21 12/6/21 12/6/21    0944 0944 0944 0941 0941 0941 0941   Glucose 91    87     BUN 16    17     Creatinine 1.25    1.24     eGFR Non  Am 57 (A)    59 (A)     eGFR African Am 69    68     Sodium 140    142     Potassium 4.4    4.9     Chloride 105    106     Calcium 10.0    9.3     Total Protein 6.7    6.4     Albumin 4.50    4.3     Total Bilirubin 0.4    0.5     Alkaline Phosphatase 73    68     AST (SGOT) 41 (A)    17     ALT (SGPT) 21    17     WBC  4.04  4.0      Hemoglobin  14.2  12.5 (A)      Hematocrit  43.4  39.4      Platelets  347  404      Total Cholesterol   133   122    Triglycerides   85   71    HDL Cholesterol   52   50    LDL Cholesterol    65   57    PSA       2.6   (A) Abnormal value       Comments are available for some flowsheets but are not being displayed.           Data reviewed: Consultant notes ortho (Dr. Mcduffie)          Assessment and Plan    Diagnoses and all  orders for this visit:    1. Benign essential HTN (Primary)  Assessment & Plan:  Hypertension is unchanged.  Continue current treatment regimen.  Dietary sodium restriction.  Blood pressure will be reassessed at the next regular appointment.  He will continue Irbesartan which he is tolerating well. BP is mildly elevate din the office but home readings are consistently <140/90, continue to monitor.    Orders:  -     CBC & Differential  -     Comprehensive Metabolic Panel  -     Lipid Panel  -     TSH    2. Paresthesias  Assessment & Plan:  Sx radicular from cervical spine vs. CTS? Check labs to further evaluate, consider further evaluation if sx persist and/or worsen    Orders:  -     Vitamin B12    3. Chronic bilateral low back pain without sciatica  Assessment & Plan:  Sx managed with Meloxicam as needed, does not take daily    Orders:  -     meloxicam (MOBIC) 15 MG tablet; Take 1 tablet by mouth Daily As Needed for Moderate Pain . Take with food  Dispense: 90 tablet; Refill: 1    4. Pure hypercholesterolemia  Assessment & Plan:  He denies myalgia with atorvastatin which he will continue along with a low-fat, low-cholesterol diet..      5. Acquired hypothyroidism  Assessment & Plan:  He is currently managed on Synthroid, recheck TSH today      6. Screening for prostate cancer  -     PSA Screen    7. Screening for colon cancer  -     Ambulatory Referral to Gastroenterology      Follow Up   Return in about 6 months (around 6/6/2022).  Patient was given instructions and counseling regarding his condition or for health maintenance advice. Please see specific information pulled into the AVS if appropriate.       Answers for HPI/ROS submitted by the patient on 11/30/2021  What is the primary reason for your visit?: High Blood Pressure

## 2021-12-07 LAB
ALBUMIN SERPL-MCNC: 4.3 G/DL (ref 3.8–4.8)
ALBUMIN/GLOB SERPL: 2 {RATIO} (ref 1.2–2.2)
ALP SERPL-CCNC: 68 IU/L (ref 44–121)
ALT SERPL-CCNC: 17 IU/L (ref 0–44)
AST SERPL-CCNC: 17 IU/L (ref 0–40)
BASOPHILS # BLD AUTO: 0 X10E3/UL (ref 0–0.2)
BASOPHILS NFR BLD AUTO: 1 %
BILIRUB SERPL-MCNC: 0.5 MG/DL (ref 0–1.2)
BUN SERPL-MCNC: 17 MG/DL (ref 8–27)
BUN/CREAT SERPL: 14 (ref 10–24)
CALCIUM SERPL-MCNC: 9.3 MG/DL (ref 8.6–10.2)
CHLORIDE SERPL-SCNC: 106 MMOL/L (ref 96–106)
CHOLEST SERPL-MCNC: 122 MG/DL (ref 100–199)
CO2 SERPL-SCNC: 22 MMOL/L (ref 20–29)
CREAT SERPL-MCNC: 1.24 MG/DL (ref 0.76–1.27)
EOSINOPHIL # BLD AUTO: 0.2 X10E3/UL (ref 0–0.4)
EOSINOPHIL NFR BLD AUTO: 6 %
ERYTHROCYTE [DISTWIDTH] IN BLOOD BY AUTOMATED COUNT: 14.2 % (ref 11.6–15.4)
GLOBULIN SER CALC-MCNC: 2.1 G/DL (ref 1.5–4.5)
GLUCOSE SERPL-MCNC: 87 MG/DL (ref 65–99)
HCT VFR BLD AUTO: 39.4 % (ref 37.5–51)
HDLC SERPL-MCNC: 50 MG/DL
HGB BLD-MCNC: 12.5 G/DL (ref 13–17.7)
IMM GRANULOCYTES # BLD AUTO: 0 X10E3/UL (ref 0–0.1)
IMM GRANULOCYTES NFR BLD AUTO: 0 %
LDLC SERPL CALC-MCNC: 57 MG/DL (ref 0–99)
LYMPHOCYTES # BLD AUTO: 1.4 X10E3/UL (ref 0.7–3.1)
LYMPHOCYTES NFR BLD AUTO: 34 %
MCH RBC QN AUTO: 28.4 PG (ref 26.6–33)
MCHC RBC AUTO-ENTMCNC: 31.7 G/DL (ref 31.5–35.7)
MCV RBC AUTO: 90 FL (ref 79–97)
MONOCYTES # BLD AUTO: 0.3 X10E3/UL (ref 0.1–0.9)
MONOCYTES NFR BLD AUTO: 9 %
NEUTROPHILS # BLD AUTO: 2 X10E3/UL (ref 1.4–7)
NEUTROPHILS NFR BLD AUTO: 50 %
PLATELET # BLD AUTO: 404 X10E3/UL (ref 150–450)
POTASSIUM SERPL-SCNC: 4.9 MMOL/L (ref 3.5–5.2)
PROT SERPL-MCNC: 6.4 G/DL (ref 6–8.5)
PSA SERPL-MCNC: 2.6 NG/ML (ref 0–4)
RBC # BLD AUTO: 4.4 X10E6/UL (ref 4.14–5.8)
SODIUM SERPL-SCNC: 142 MMOL/L (ref 134–144)
TRIGL SERPL-MCNC: 71 MG/DL (ref 0–149)
TSH SERPL DL<=0.005 MIU/L-ACNC: 1.7 UIU/ML (ref 0.45–4.5)
VIT B12 SERPL-MCNC: >2000 PG/ML (ref 232–1245)
VLDLC SERPL CALC-MCNC: 15 MG/DL (ref 5–40)
WBC # BLD AUTO: 4 X10E3/UL (ref 3.4–10.8)

## 2021-12-07 NOTE — PROGRESS NOTES
Mr. Montenegro, your recent labs show a normal hemoglobin and hematocrit without anemia.  Your kidney and liver function as well as electrolytes are normal.  Fasting glucose is normal.  Your cholesterol is good-continue atorvastatin along with a low-fat, low-cholesterol diet.  Thyroid function is within therapeutic range-continue current Synthroid dose.  Vitamin B12 level is elevated, you may discontinue daily supplement for now.  PSA, screen for prostate cancer, is normal and stable from last year.

## 2021-12-11 DIAGNOSIS — F51.01 PRIMARY INSOMNIA: ICD-10-CM

## 2021-12-13 RX ORDER — ZOLPIDEM TARTRATE 10 MG/1
TABLET ORAL
Qty: 30 TABLET | Refills: 1 | Status: SHIPPED | OUTPATIENT
Start: 2021-12-13 | End: 2022-06-06 | Stop reason: SDUPTHER

## 2021-12-19 PROBLEM — R20.2 PARESTHESIAS: Status: ACTIVE | Noted: 2021-12-19

## 2021-12-19 NOTE — ASSESSMENT & PLAN NOTE
Sx radicular from cervical spine vs. CTS? Check labs to further evaluate, consider further evaluation if sx persist and/or worsen

## 2021-12-19 NOTE — ASSESSMENT & PLAN NOTE
He denies myalgia with atorvastatin which he will continue along with a low-fat, low-cholesterol diet..

## 2021-12-19 NOTE — ASSESSMENT & PLAN NOTE
Hypertension is unchanged.  Continue current treatment regimen.  Dietary sodium restriction.  Blood pressure will be reassessed at the next regular appointment.  He will continue Irbesartan which he is tolerating well. BP is mildly elevate din the office but home readings are consistently <140/90, continue to monitor.

## 2021-12-30 ENCOUNTER — PRE-PROCEDURE SCREENING (OUTPATIENT)
Dept: GASTROENTEROLOGY | Facility: CLINIC | Age: 71
End: 2021-12-30

## 2022-01-11 DIAGNOSIS — Z83.71 FAMILY HISTORY OF POLYPS IN THE COLON: ICD-10-CM

## 2022-01-11 DIAGNOSIS — D12.6 ADENOMATOUS POLYP OF COLON, UNSPECIFIED PART OF COLON: Primary | ICD-10-CM

## 2022-01-25 ENCOUNTER — TELEPHONE (OUTPATIENT)
Dept: GASTROENTEROLOGY | Facility: CLINIC | Age: 72
End: 2022-01-25

## 2022-01-25 NOTE — TELEPHONE ENCOUNTER
ZORAIDA April at [facility] via FAX for colonoscopy on 03/14/2022  arrive at  9am  . Mailed Prep instructions to Mailing address on-file. ----[facility]      Advised PT  that  will call with final arrival time  24 hrs before procedure. If they do not get a phone call, arrival time will stay the same as given on instructions

## 2022-03-01 RX ORDER — LEVOTHYROXINE SODIUM 112 UG/1
TABLET ORAL
Qty: 90 TABLET | Refills: 3 | Status: SHIPPED | OUTPATIENT
Start: 2022-03-01 | End: 2023-02-17

## 2022-03-14 ENCOUNTER — OUTSIDE FACILITY SERVICE (OUTPATIENT)
Dept: GASTROENTEROLOGY | Facility: CLINIC | Age: 72
End: 2022-03-14

## 2022-03-14 PROCEDURE — 45380 COLONOSCOPY AND BIOPSY: CPT | Performed by: INTERNAL MEDICINE

## 2022-03-23 ENCOUNTER — TELEPHONE (OUTPATIENT)
Dept: GASTROENTEROLOGY | Facility: CLINIC | Age: 72
End: 2022-03-23

## 2022-03-23 NOTE — TELEPHONE ENCOUNTER
----- Message from Ronn RIVERA MD sent at 3/22/2022  5:05 PM EDT -----  Regarding: Biopsy results  Okay to call results, recommend follow-up colonoscopy in 3 years time.  Office follow-up sooner as needed.  ----- Message -----  From: Interface, Scans Incoming  Sent: 3/22/2022   9:51 AM EDT  To: Ronn RIVERA MD

## 2022-03-23 NOTE — TELEPHONE ENCOUNTER
Patient notified of results, per path report ( tubular adenomas) and recommendations and verbalized understanding    Recall placed for 3/14/25

## 2022-04-23 DIAGNOSIS — I10 BENIGN ESSENTIAL HTN: ICD-10-CM

## 2022-04-25 RX ORDER — IRBESARTAN 300 MG/1
TABLET ORAL
Qty: 90 TABLET | Refills: 1 | Status: SHIPPED | OUTPATIENT
Start: 2022-04-25 | End: 2022-10-13

## 2022-06-06 ENCOUNTER — OFFICE VISIT (OUTPATIENT)
Dept: INTERNAL MEDICINE | Facility: CLINIC | Age: 72
End: 2022-06-06

## 2022-06-06 VITALS
OXYGEN SATURATION: 100 % | WEIGHT: 189 LBS | HEIGHT: 70 IN | DIASTOLIC BLOOD PRESSURE: 84 MMHG | BODY MASS INDEX: 27.06 KG/M2 | HEART RATE: 61 BPM | SYSTOLIC BLOOD PRESSURE: 134 MMHG | TEMPERATURE: 98.2 F

## 2022-06-06 DIAGNOSIS — N52.9 ERECTILE DYSFUNCTION, UNSPECIFIED ERECTILE DYSFUNCTION TYPE: ICD-10-CM

## 2022-06-06 DIAGNOSIS — E78.00 PURE HYPERCHOLESTEROLEMIA: Chronic | ICD-10-CM

## 2022-06-06 DIAGNOSIS — E03.9 ACQUIRED HYPOTHYROIDISM: Chronic | ICD-10-CM

## 2022-06-06 DIAGNOSIS — M48.061 SPINAL STENOSIS OF LUMBAR REGION WITHOUT NEUROGENIC CLAUDICATION: ICD-10-CM

## 2022-06-06 DIAGNOSIS — I10 BENIGN ESSENTIAL HTN: Primary | Chronic | ICD-10-CM

## 2022-06-06 DIAGNOSIS — F51.01 PRIMARY INSOMNIA: ICD-10-CM

## 2022-06-06 DIAGNOSIS — R06.83 SNORING: ICD-10-CM

## 2022-06-06 PROCEDURE — 99214 OFFICE O/P EST MOD 30 MIN: CPT | Performed by: NURSE PRACTITIONER

## 2022-06-06 PROCEDURE — 1170F FXNL STATUS ASSESSED: CPT | Performed by: NURSE PRACTITIONER

## 2022-06-06 PROCEDURE — 1125F AMNT PAIN NOTED PAIN PRSNT: CPT | Performed by: NURSE PRACTITIONER

## 2022-06-06 PROCEDURE — 1159F MED LIST DOCD IN RCRD: CPT | Performed by: NURSE PRACTITIONER

## 2022-06-06 PROCEDURE — G0439 PPPS, SUBSEQ VISIT: HCPCS | Performed by: NURSE PRACTITIONER

## 2022-06-06 RX ORDER — SILDENAFIL 100 MG/1
100 TABLET, FILM COATED ORAL DAILY PRN
Qty: 10 TABLET | Refills: 3 | Status: SHIPPED | OUTPATIENT
Start: 2022-06-06

## 2022-06-06 RX ORDER — ZOLPIDEM TARTRATE 10 MG/1
10 TABLET ORAL NIGHTLY PRN
Qty: 30 TABLET | Refills: 1 | Status: SHIPPED | OUTPATIENT
Start: 2022-06-06

## 2022-06-06 NOTE — PROGRESS NOTES
"Chief Complaint  Medicare Wellness-subsequent, Hypertension, and Back Pain    Subjective        Anoop Montenegro presents to [facility] for f/u regarding HTN, chronic low back pain and hypothyroidism.    He has a history of lumbar stenosis and notes a gradual increase in pain over the past year.  He complains of increased tightness in his lumbar spine as well as paresthesias of bilateral thighs.  He has had epidurals in the past (2018) which he states were helpful.    Hypertension  This is a recurrent problem. The current episode started more than 1 year ago. The problem has been waxing and waning since onset. The problem is controlled. Associated symptoms include malaise/fatigue. Pertinent negatives include no anxiety, blurred vision, chest pain, headaches, neck pain, orthopnea, palpitations, peripheral edema, PND, shortness of breath or sweats. Agents associated with hypertension include thyroid hormones. Risk factors for coronary artery disease include dyslipidemia and family history. There are no compliance problems.      Objective   Vital Signs:  /84 (BP Location: Left arm, Patient Position: Sitting, Cuff Size: Adult)   Pulse 61   Temp 98.2 °F (36.8 °C) (Temporal)   Ht 177.8 cm (70\")   Wt 85.7 kg (189 lb)   SpO2 100%   BMI 27.12 kg/m²           Physical Exam  Constitutional:       Appearance: He is well-developed. He is not ill-appearing.   HENT:      Head: Normocephalic.      Right Ear: Tympanic membrane and external ear normal. Decreased hearing noted. Tympanic membrane is not injected.      Left Ear: Tympanic membrane and external ear normal. Decreased hearing noted. Tympanic membrane is not injected.      Ears:      Comments: Bilateral hearing aids which were removed for exam     Nose: Nose normal. No nasal deformity, mucosal edema or rhinorrhea.      Right Sinus: No maxillary sinus tenderness or frontal sinus tenderness.      Left Sinus: No maxillary sinus " tenderness or frontal sinus tenderness.      Mouth/Throat:      Dentition: Normal dentition.   Eyes:      General: Lids are normal.         Right eye: No discharge.         Left eye: No discharge.      Conjunctiva/sclera: Conjunctivae normal.      Right eye: No exudate.     Left eye: No exudate.  Neck:      Thyroid: No thyroid mass or thyromegaly.      Vascular: No carotid bruit.      Trachea: Trachea normal.   Cardiovascular:      Rate and Rhythm: Regular rhythm.      Pulses: Normal pulses.      Heart sounds: Normal heart sounds. No murmur heard.  Pulmonary:      Effort: No respiratory distress.      Breath sounds: Normal breath sounds. No decreased breath sounds, wheezing, rhonchi or rales.   Abdominal:      General: Bowel sounds are normal.      Palpations: Abdomen is soft.      Tenderness: There is no abdominal tenderness.   Musculoskeletal:      Cervical back: Normal range of motion. No edema.   Lymphadenopathy:      Head:      Right side of head: No submental, submandibular, tonsillar, preauricular, posterior auricular or occipital adenopathy.      Left side of head: No submental, submandibular, tonsillar, preauricular, posterior auricular or occipital adenopathy.   Skin:     General: Skin is warm and dry.      Nails: There is no clubbing.   Neurological:      Mental Status: He is alert.   Psychiatric:         Behavior: Behavior is cooperative.        Result Review :  The following data was reviewed by: DONNIE Narayan on 06/06/2022:  Common labs    Common Labsle 12/6/21 12/6/21 12/6/21 12/6/21    0941 0941 0941 0941   Glucose  87     BUN  17     Creatinine  1.24     eGFR Non  Am  59 (A)     eGFR African Am  68     Sodium  142     Potassium  4.9     Chloride  106     Calcium  9.3     Total Protein  6.4     Albumin  4.3     Total Bilirubin  0.5     Alkaline Phosphatase  68     AST (SGOT)  17     ALT (SGPT)  17     WBC 4.0      Hemoglobin 12.5 (A)      Hematocrit 39.4      Platelets 404      Total  Cholesterol   122    Triglycerides   71    HDL Cholesterol   50    LDL Cholesterol    57    PSA    2.6   (A) Abnormal value       Comments are available for some flowsheets but are not being displayed.                     Assessment and Plan   Diagnoses and all orders for this visit:    1. Benign essential HTN (Primary)  Assessment & Plan:  Hypertension is unchanged.  Continue current treatment regimen.  Dietary sodium restriction.  Blood pressure will be reassessed at the next regular appointment.  He is currently managed on irbesartan which he is tolerating well.    Orders:  -     CBC & Differential  -     Comprehensive Metabolic Panel    2. Erectile dysfunction, unspecified erectile dysfunction type  Assessment & Plan:  Symptoms managed with sildenafil as needed    Orders:  -     sildenafil (VIAGRA) 100 MG tablet; Take 1 tablet by mouth Daily As Needed for Erectile Dysfunction.  Dispense: 10 tablet; Refill: 3    3. Pure hypercholesterolemia  Assessment & Plan:  He denies myalgias with atorvastatin which he will continue along with a low-fat, low-cholesterol diet.      Orders:  -     Lipid Panel    4. Primary insomnia  -     zolpidem (AMBIEN) 10 MG tablet; Take 1 tablet by mouth At Night As Needed for Sleep.  Dispense: 30 tablet; Refill: 1    5. Acquired hypothyroidism  Assessment & Plan:  He is currently managed on Synthroid 112 mcg daily, recheck TSH    Orders:  -     TSH    6. Spinal stenosis of lumbar region without neurogenic claudication  Assessment & Plan:  MRI 11/17/2017, discussed possible epidurals which he has declined for now but we will continue to monitor his pain.  He takes Tylenol as needed for pain but also has meloxicam.  He does complain of intermittent tingling of his bilateral outer thighs without lower extremity weakness.  He will follow-up if symptoms worsen.      7. Snoring  Assessment & Plan:  He complains of snoring at night as reported by his wife and requests sleep study to further  evaluate    Orders:  -     Ambulatory Referral to Sleep Medicine           Follow Up   Return in about 6 months (around 12/6/2022).  Patient was given instructions and counseling regarding his condition or for health maintenance advice. Please see specific information pulled into the AVS if appropriate.       Answers for HPI/ROS submitted by the patient on 6/2/2022  What is the primary reason for your visit?: High Blood Pressure

## 2022-06-06 NOTE — PATIENT INSTRUCTIONS
Medicare Wellness  Personal Prevention Plan of Service     Date of Office Visit:    Encounter Provider:  DONNIE Narayan  Place of Service:  [facility]  Patient Name: Anoop Montenegro  :  1950    As part of the Medicare Wellness portion of your visit today, we are providing you with this personalized preventive plan of services (PPPS). This plan is based upon recommendations of the [place] Preventive Services Task Force (USPSTF) and the Advisory Committee on Immunization Practices (ACIP).    This lists the preventive care services that should be considered, and provides dates of when you are due. Items listed as completed are up-to-date and do not require any further intervention.    Health Maintenance   Topic Date Due    COVID-19 Vaccine (4 - Booster for Pfizer series) 2022    ANNUAL WELLNESS VISIT  2022    INFLUENZA VACCINE  2022    LIPID PANEL  2022    COLORECTAL CANCER SCREENING  2025    TDAP/TD VACCINES (3 - Td or Tdap) 2031    HEPATITIS C SCREENING  Completed    Pneumococcal Vaccine 65+  Completed    ZOSTER VACCINE  Completed       Orders Placed This Encounter   Procedures    Comprehensive Metabolic Panel     Order Specific Question:   Release to patient     Answer:   Immediate    Lipid Panel    TSH     Order Specific Question:   Release to patient     Answer:   Immediate    Ambulatory Referral to Sleep Medicine     Referral Priority:   Routine     Referral Type:   Consultation     Referral Reason:   Specialty Services Required     Requested Specialty:   Sleep Medicine     Number of Visits Requested:   1    CBC & Differential     Order Specific Question:   Manual Differential     Answer:   No       Return in about 6 months (around 2022).

## 2022-06-06 NOTE — ASSESSMENT & PLAN NOTE
He denies myalgias with atorvastatin which he will continue along with a low-fat, low-cholesterol diet.

## 2022-06-06 NOTE — ASSESSMENT & PLAN NOTE
MRI 11/17/2017, discussed possible epidurals which he has declined for now but we will continue to monitor his pain.  He takes Tylenol as needed for pain but also has meloxicam.  He does complain of intermittent tingling of his bilateral outer thighs without lower extremity weakness.  He will follow-up if symptoms worsen.

## 2022-06-06 NOTE — ASSESSMENT & PLAN NOTE
He complains of snoring at night as reported by his wife and requests sleep study to further evaluate

## 2022-06-06 NOTE — ASSESSMENT & PLAN NOTE
Hypertension is unchanged.  Continue current treatment regimen.  Dietary sodium restriction.  Blood pressure will be reassessed at the next regular appointment.  He is currently managed on irbesartan which he is tolerating well.

## 2022-06-06 NOTE — PROGRESS NOTES
The ABCs of the Annual Wellness Visit  Subsequent Medicare Wellness Visit    Chief Complaint   Patient presents with   • Medicare Wellness-subsequent   • Hypertension   • Back Pain      Subjective    History of Present Illness:  Anoop Montenegro is a 71 y.o. male who presents for a Subsequent Medicare Wellness Visit.    The following portions of the patient's history were reviewed and updated as appropriate: allergies, current medications, past family history, past medical history, past social history, past surgical history and problem list.    Compared to one year ago, the patient feels his physical   health is the same.    Compared to one year ago, the patient feels his mental   health is the same.    Recent Hospitalizations:  He was not admitted to the hospital during the last year.       Current Medical Providers:  Patient Care Team:  Cathy Muhammad APRN as PCP - General (Internal Medicine)  Jomar Vieira MD as Referring Physician (Hospitalist)  Elise Johnson MD as Consulting Physician (Hematology and Oncology)    Outpatient Medications Prior to Visit   Medication Sig Dispense Refill   • atorvastatin (LIPITOR) 20 MG tablet TAKE 1 TABLET DAILY 90 tablet 3   • irbesartan (AVAPRO) 300 MG tablet TAKE 1 TABLET DAILY 90 tablet 1   • levothyroxine (SYNTHROID, LEVOTHROID) 112 MCG tablet TAKE 1 TABLET DAILY (DOSE  CHANGED FROM 125MCG TO     112MCG, PLEASE DISCONTINUE 125MCG) 90 tablet 3   • meloxicam (MOBIC) 15 MG tablet Take 1 tablet by mouth Daily As Needed for Moderate Pain . Take with food 90 tablet 1   • zolpidem (AMBIEN) 10 MG tablet TAKE ONE TABLET BY MOUTH ONCE NIGHTLY AS NEEDED FOR SLEEP 30 tablet 1     No facility-administered medications prior to visit.       No opioid medication identified on active medication list. I have reviewed chart for other potential  high risk medication/s and harmful drug interactions in the elderly.          Aspirin is not on active medication list.  Aspirin use is  "not indicated based on review of current medical condition/s. Risk of harm outweighs potential benefits.  .    Patient Active Problem List   Diagnosis   • Cervical spinal stenosis   • DDD (degenerative disc disease), cervical   • Benign essential HTN   • Raynaud's phenomenon without gangrene   • Chronic bilateral low back pain without sciatica   • Hypothyroidism   • Insomnia   • Hyperlipidemia   • Erectile dysfunction   • Snoring   • Spinal stenosis of lumbar region without neurogenic claudication     Advance Care Planning  Advance Directive is not on file.  ACP discussion was held with the patient during this visit. Patient has an advance directive (not in EMR), copy requested.    Review of Systems   Respiratory: Negative for shortness of breath.    Cardiovascular: Negative for chest pain and palpitations.   Musculoskeletal: Negative for neck pain.        Objective    Vitals:    06/06/22 0832 06/06/22 0914   BP: 128/88 134/84   BP Location: Left arm Left arm   Patient Position: Sitting Sitting   Cuff Size: Adult Adult   Pulse: 61    Temp: 98.2 °F (36.8 °C)    TempSrc: Temporal    SpO2: 100%    Weight: 85.7 kg (189 lb)    Height: 177.8 cm (70\")    PainSc:   4    PainLoc: Back      Body mass index is 27.12 kg/m².  BMI is >= 25 and < 30. (Overweight) The following options were offered after discussion: continue a low-fat, low-cholesterol diet    Does the patient have evidence of cognitive impairment? No    Physical Exam            HEALTH RISK ASSESSMENT    Smoking Status:  Social History     Tobacco Use   Smoking Status Never Smoker   Smokeless Tobacco Never Used     Alcohol Consumption:  Social History     Substance and Sexual Activity   Alcohol Use Yes   • Alcohol/week: 2.0 standard drinks   • Types: 1 Glasses of wine, 1 Cans of beer per week    Comment: 3-5 times a week     Fall Risk Screen:    JUAN MANUELADI Fall Risk Assessment was completed, and patient is at LOW risk for falls.Assessment completed " on:6/6/2022    Depression Screening:  PHQ-2/PHQ-9 Depression Screening 6/6/2022   Retired PHQ-9 Total Score -   Retired Total Score -   Little Interest or Pleasure in Doing Things 0-->not at all   Feeling Down, Depressed or Hopeless 0-->not at all   PHQ-9: Brief Depression Severity Measure Score 0       Health Habits and Functional and Cognitive Screening:  Functional & Cognitive Status 6/6/2022   Do you have difficulty preparing food and eating? No   Do you have difficulty bathing yourself, getting dressed or grooming yourself? No   Do you have difficulty using the toilet? No   Do you have difficulty moving around from place to place? No   Do you have trouble with steps or getting out of a bed or a chair? No   Current Diet Well Balanced Diet   Dental Exam Up to date   Eye Exam Up to date   Exercise (times per week) 4 times per week   Current Exercises Include Walking   Do you need help using the phone?  No   Are you deaf or do you have serious difficulty hearing?  Yes   Do you need help with transportation? No   Do you need help shopping? No   Do you need help preparing meals?  No   Do you need help with housework?  No   Do you need help with laundry? No   Do you need help taking your medications? No   Do you need help managing money? No   Do you ever drive or ride in a car without wearing a seat belt? No   Have you felt unusual stress, anger or loneliness in the last month? No   Who do you live with? Spouse   If you need help, do you have trouble finding someone available to you? No   Have you been bothered in the last four weeks by sexual problems? No   Do you have difficulty concentrating, remembering or making decisions? No       Age-appropriate Screening Schedule:  Refer to the list below for future screening recommendations based on patient's age, sex and/or medical conditions. Orders for these recommended tests are listed in the plan section. The patient has been provided with a written plan.    Health  Maintenance   Topic Date Due   • INFLUENZA VACCINE  08/01/2022   • LIPID PANEL  12/06/2022   • TDAP/TD VACCINES (3 - Td or Tdap) 05/13/2031   • ZOSTER VACCINE  Completed              Assessment & Plan   CMS Preventative Services Quick Reference  Risk Factors Identified During Encounter  Chronic Pain   Immunizations Discussed/Encouraged (specific Immunizations; COVID19  The above risks/problems have been discussed with the patient.  Follow up actions/plans if indicated are seen below in the Assessment/Plan Section.  Pertinent information has been shared with the patient in the After Visit Summary.    Diagnoses and all orders for this visit:    1. Benign essential HTN (Primary)  Assessment & Plan:  Hypertension is unchanged.  Continue current treatment regimen.  Dietary sodium restriction.  Blood pressure will be reassessed at the next regular appointment.  He is currently managed on irbesartan which he is tolerating well.    Orders:  -     CBC & Differential  -     Comprehensive Metabolic Panel    2. Erectile dysfunction, unspecified erectile dysfunction type  Assessment & Plan:  Symptoms managed with sildenafil as needed    Orders:  -     sildenafil (VIAGRA) 100 MG tablet; Take 1 tablet by mouth Daily As Needed for Erectile Dysfunction.  Dispense: 10 tablet; Refill: 3    3. Pure hypercholesterolemia  Assessment & Plan:  He denies myalgias with atorvastatin which he will continue along with a low-fat, low-cholesterol diet.      Orders:  -     Lipid Panel    4. Primary insomnia  -     zolpidem (AMBIEN) 10 MG tablet; Take 1 tablet by mouth At Night As Needed for Sleep.  Dispense: 30 tablet; Refill: 1    5. Acquired hypothyroidism  Assessment & Plan:  He is currently managed on Synthroid 112 mcg daily, recheck TSH    Orders:  -     TSH    6. Spinal stenosis of lumbar region without neurogenic claudication  Assessment & Plan:  MRI 11/17/2017, discussed possible epidurals which he has declined for now but we will continue  to monitor his pain.  He takes Tylenol as needed for pain but also has meloxicam.  He does complain of intermittent tingling of his bilateral outer thighs without lower extremity weakness.  He will follow-up if symptoms worsen.      7. Snoring  Assessment & Plan:  He complains of snoring at night as reported by his wife and requests sleep study to further evaluate    Orders:  -     Ambulatory Referral to Sleep Medicine      Follow Up:   Return in about 6 months (around 12/6/2022).     An After Visit Summary and PPPS were made available to the patient.                     Answers for HPI/ROS submitted by the patient on 6/2/2022  What is the primary reason for your visit?: High Blood Pressure  Chronicity: recurrent  Onset: more than 1 year ago  Progression since onset: waxing and waning  Condition status: controlled  anxiety: No  blurred vision: No  malaise/fatigue: Yes  orthopnea: No  peripheral edema: No  PND: No  sweats: No  Agents associated with hypertension: thyroid hormones  CAD risks: dyslipidemia, family history  Compliance problems: no compliance problems

## 2022-06-07 DIAGNOSIS — N28.9 RENAL INSUFFICIENCY: Primary | ICD-10-CM

## 2022-06-07 LAB
ALBUMIN SERPL-MCNC: 4.7 G/DL (ref 3.8–4.8)
ALBUMIN/GLOB SERPL: 2 {RATIO} (ref 1.2–2.2)
ALP SERPL-CCNC: 85 IU/L (ref 44–121)
ALT SERPL-CCNC: 17 IU/L (ref 0–44)
AST SERPL-CCNC: 20 IU/L (ref 0–40)
BASOPHILS # BLD AUTO: 0.1 X10E3/UL (ref 0–0.2)
BASOPHILS NFR BLD AUTO: 1 %
BILIRUB SERPL-MCNC: 0.4 MG/DL (ref 0–1.2)
BUN SERPL-MCNC: 20 MG/DL (ref 8–27)
BUN/CREAT SERPL: 15 (ref 10–24)
CALCIUM SERPL-MCNC: 10.5 MG/DL (ref 8.6–10.2)
CHLORIDE SERPL-SCNC: 103 MMOL/L (ref 96–106)
CHOLEST SERPL-MCNC: 142 MG/DL (ref 100–199)
CO2 SERPL-SCNC: 25 MMOL/L (ref 20–29)
CREAT SERPL-MCNC: 1.32 MG/DL (ref 0.76–1.27)
EGFRCR SERPLBLD CKD-EPI 2021: 58 ML/MIN/1.73
EOSINOPHIL # BLD AUTO: 0.2 X10E3/UL (ref 0–0.4)
EOSINOPHIL NFR BLD AUTO: 5 %
ERYTHROCYTE [DISTWIDTH] IN BLOOD BY AUTOMATED COUNT: 15.2 % (ref 11.6–15.4)
GLOBULIN SER CALC-MCNC: 2.4 G/DL (ref 1.5–4.5)
GLUCOSE SERPL-MCNC: 95 MG/DL (ref 65–99)
HCT VFR BLD AUTO: 44 % (ref 37.5–51)
HDLC SERPL-MCNC: 53 MG/DL
HGB BLD-MCNC: 14 G/DL (ref 13–17.7)
IMM GRANULOCYTES # BLD AUTO: 0 X10E3/UL (ref 0–0.1)
IMM GRANULOCYTES NFR BLD AUTO: 0 %
LDLC SERPL CALC-MCNC: 72 MG/DL (ref 0–99)
LYMPHOCYTES # BLD AUTO: 1.7 X10E3/UL (ref 0.7–3.1)
LYMPHOCYTES NFR BLD AUTO: 37 %
MCH RBC QN AUTO: 27.9 PG (ref 26.6–33)
MCHC RBC AUTO-ENTMCNC: 31.8 G/DL (ref 31.5–35.7)
MCV RBC AUTO: 88 FL (ref 79–97)
MONOCYTES # BLD AUTO: 0.5 X10E3/UL (ref 0.1–0.9)
MONOCYTES NFR BLD AUTO: 12 %
NEUTROPHILS # BLD AUTO: 2 X10E3/UL (ref 1.4–7)
NEUTROPHILS NFR BLD AUTO: 45 %
PLATELET # BLD AUTO: 371 X10E3/UL (ref 150–450)
POTASSIUM SERPL-SCNC: 5.7 MMOL/L (ref 3.5–5.2)
PROT SERPL-MCNC: 7.1 G/DL (ref 6–8.5)
RBC # BLD AUTO: 5.02 X10E6/UL (ref 4.14–5.8)
SODIUM SERPL-SCNC: 140 MMOL/L (ref 134–144)
TRIGL SERPL-MCNC: 87 MG/DL (ref 0–149)
TSH SERPL DL<=0.005 MIU/L-ACNC: 1.9 UIU/ML (ref 0.45–4.5)
VLDLC SERPL CALC-MCNC: 17 MG/DL (ref 5–40)
WBC # BLD AUTO: 4.4 X10E3/UL (ref 3.4–10.8)

## 2022-06-14 NOTE — PROGRESS NOTES
Patient: Anoop Montenegro  YOB: 1950  Date of Service: 6/14/2022    Chief Complaints: + Bilateral shoulder pain    Subjective:    History of Present Illness: Pt is seen in the office today with complaints of bilateral shoulder pain I last saw him over a year ago for his right shoulder he states the right 1 was in February 2021 (really October 2020 left is worse than right he does have night pain no history injury change in activity stiffness worse with activity somewhat better with cancer reflux hyperlipidemia hypertension worse with activity somewhat better with rest the left is a 7 out of 10 right is a 5 out of 10.          Allergies: No Known Allergies    Medications:   Home Medications:  Current Outpatient Medications on File Prior to Visit   Medication Sig   • atorvastatin (LIPITOR) 20 MG tablet TAKE 1 TABLET DAILY   • irbesartan (AVAPRO) 300 MG tablet TAKE 1 TABLET DAILY   • levothyroxine (SYNTHROID, LEVOTHROID) 112 MCG tablet TAKE 1 TABLET DAILY (DOSE  CHANGED FROM 125MCG TO     112MCG, PLEASE DISCONTINUE 125MCG)   • meloxicam (MOBIC) 15 MG tablet Take 1 tablet by mouth Daily As Needed for Moderate Pain . Take with food   • sildenafil (VIAGRA) 100 MG tablet Take 1 tablet by mouth Daily As Needed for Erectile Dysfunction.   • zolpidem (AMBIEN) 10 MG tablet Take 1 tablet by mouth At Night As Needed for Sleep.     No current facility-administered medications on file prior to visit.     Current Medications:  Scheduled Meds:  Continuous Infusions:No current facility-administered medications for this visit.    PRN Meds:.    I have reviewed the patient's medical history in detail and updated the computerized patient record.  Review and summarization of old records include:    Past Medical History:   Diagnosis Date   • Allergic rhinitis    • Anxiety 06/2017    mild, occational   • Arthritis    • Cancer (HCC)     skin   • Cataract 1/1/2020    Surgery around 10/2021   • Cervical spondylosis with  myelopathy    • Colon polyp 2012    Last colonoscopy 3/2022   • Elevated cholesterol    • FHx: colonic polyps    • GERD (gastroesophageal reflux disease)    • History of colonic polyps    • History of kidney stones    • HL (hearing loss) 2017    Very little in left ear   • Hyperlipidemia    • Hypertension     Benign Essential   • Hypothyroidism    • Insomnia    • Low back pain 2017    pain is worse in 2017. siadica   • Malaise and fatigue    • Pure hypercholesterolemia    • Scoliosis 2017    see recent x-rays. 3,4,5 vertabraes        Past Surgical History:   Procedure Laterality Date   • CATARACT EXTRACTION, BILATERAL Bilateral 10/2021   • COLONOSCOPY N/A 2011   • COLONOSCOPY N/A 2005   • COLONOSCOPY N/A 11/15/2013   • CYST REMOVAL     • FRACTURE SURGERY      right thumb   • HAND SURGERY Left     3rd finger   • SKIN BIOPSY      scalp   • VASECTOMY          Social History     Occupational History   • Occupation: RETIRED   Tobacco Use   • Smoking status: Never Smoker   • Smokeless tobacco: Never Used   Vaping Use   • Vaping Use: Never used   Substance and Sexual Activity   • Alcohol use: Yes     Alcohol/week: 2.0 standard drinks     Types: 1 Glasses of wine, 1 Cans of beer per week     Comment: 3-5 times a week   • Drug use: Never   • Sexual activity: Yes     Partners: Female      Social History     Social History Narrative    LIVES WITH SPOUSE        Family History   Problem Relation Age of Onset   • Hypertension Mother    • Heart failure Mother    • Hearing loss Mother    • Hyperlipidemia Mother    • Aortic aneurysm Father    • Arthritis Father            • Heart disease Father         . 3 heart attacks   • Hyperlipidemia Father    • Liver cancer Sister    • Breast cancer Sister    • Cancer Sister         Breast cancer   • Hyperlipidemia Sister    • Prostate cancer Brother    • Heart disease Brother    • Hypertension Brother    • Hypothyroidism Brother    • Cancer  Maternal Uncle    • Heart disease Maternal Uncle        ROS: 14 point review of systems was performed and was negative except for documented findings in HPI and today's encounter.     Allergies: No Known Allergies  Constitutional:  Denies fever, shaking or chills   Eyes:  Denies change in visual acuity   HENT:  Denies nasal congestion or sore throat   Respiratory:  Denies cough or shortness of breath   Cardiovascular:  Denies chest pain or severe LE edema   GI:  Denies abdominal pain, nausea, vomiting, bloody stools or diarrhea   Musculoskeletal:  Numbness, tingling, or loss of motor function only as noted above in history of present illness.  : Denies painful urination or hematuria  Integument:  Denies rash, lesion or ulceration   Neurologic:  Denies headache or focal weakness  Endocrine:  Denies lymphadenopathy  Psych:  Denies confusion or change in mental status   Hem:  Denies active bleeding      Physical Exam: 71 y.o. male  Wt Readings from Last 3 Encounters:   06/06/22 85.7 kg (189 lb)   12/06/21 87.5 kg (193 lb)   06/03/21 86.1 kg (189 lb 14.4 oz)       There is no height or weight on file to calculate BMI.  No height and weight on file for this encounter.  There were no vitals filed for this visit.  Vital signs reviewed.   General Appearance:    Alert, cooperative, in no acute distress                    Ortho exam    Physical exam of the right shoulder reveals no overlying skin changes no lymphedema no lymphadenopathy.  Patient has active flexion 180 with mild symptoms abduction is similar external rotation is to 50 and internal rotation to the upper lumbar spine with mild symptoms.  Patient has good rotator cuff strength 4+ over 5 with isometric strength testing with pain.  Patient has a positive impingement and a positive Harry sign.  Patient has good cervical range of motion which is full and asymptomatic no radicular symptoms.  Patient has a normal elbow exam.  Good distal pulses are  present  Patient has pain with overhead activity and a positive Neer sign and a positive empty can sign , a positive drop arm and a definitive painful arc    Exam shoulder  Physical exam of the left shoulder reveals no overlying skin changes no lymphedema no lymphadenopathy.  Patient has active flexion 180 with mild symptoms abduction is similar external rotation is to 50 and internal rotation to the upper lumbar spine with mild symptoms.  Patient has good rotator cuff strength 4+ over 5 with isometric strength testing with pain.  Patient has a positive impingement and a positive Harry sign.  Patient has good cervical range of motion which is full and asymptomatic no radicular symptoms.  Patient has a normal elbow exam.  Good distal pulses are presentPatient has pain with overhead activity and a positive Neer sign and a positive empty can sign  They have a positive drop arm any definitive painful arc       X-rays AP scapular Y and actually lateral both shoulders were taken to evaluate his symptoms I did compare to x-rays done in 2020 of the right, have no comparative films of the left.  On both they are quite similar he does have some acromioclavicular arthritis mild to moderate otherwise no acute pathology    Assessment: Bilateral shoulder pain I think this is rotator cuff in origin we talked about options I think injections are quite reasonable is agreeable to do we talked about the spectrum of rotator cuff injuries if he fails to improve with these we will pursue other means of testing    Plan:   Follow up as indicated.  Ice, elevate, and rest as needed.  Discussed conservative measures of pain control including ice, bracing.  Also talked about the importance of strengthening     Carlotta Mcduffie M.D.      Large Joint Arthrocentesis: R subacromial bursa  Date/Time: 6/16/2022 10:11 AM  Consent given by: patient  Site marked: site marked  Timeout: Immediately prior to procedure a time out was called to verify the  correct patient, procedure, equipment, support staff and site/side marked as required   Supporting Documentation  Indications: pain   Procedure Details  Location: shoulder - R subacromial bursa  Preparation: Patient was prepped and draped in the usual sterile fashion  Needle gauge: 21G.  Approach: posterior  Medications administered: 80 mg methylPREDNISolone acetate 80 MG/ML; 2 mL lidocaine PF 1% 1 %  Patient tolerance: patient tolerated the procedure well with no immediate complications    Large Joint Arthrocentesis: L subacromial bursa  Date/Time: 6/16/2022 10:11 AM  Consent given by: patient  Site marked: site marked  Timeout: Immediately prior to procedure a time out was called to verify the correct patient, procedure, equipment, support staff and site/side marked as required   Supporting Documentation  Indications: pain   Procedure Details  Location: shoulder - L subacromial bursa  Preparation: Patient was prepped and draped in the usual sterile fashion  Needle gauge: 21G.  Approach: posterior  Medications administered: 80 mg methylPREDNISolone acetate 80 MG/ML; 2 mL lidocaine PF 1% 1 %  Patient tolerance: patient tolerated the procedure well with no immediate complications

## 2022-06-16 ENCOUNTER — OFFICE VISIT (OUTPATIENT)
Dept: ORTHOPEDIC SURGERY | Facility: CLINIC | Age: 72
End: 2022-06-16

## 2022-06-16 VITALS — TEMPERATURE: 97.1 F | WEIGHT: 190.1 LBS | BODY MASS INDEX: 27.22 KG/M2 | HEIGHT: 70 IN

## 2022-06-16 DIAGNOSIS — M75.42 IMPINGEMENT SYNDROME OF LEFT SHOULDER: Primary | ICD-10-CM

## 2022-06-16 DIAGNOSIS — M75.41 IMPINGEMENT SYNDROME OF RIGHT SHOULDER: ICD-10-CM

## 2022-06-16 PROCEDURE — 73030 X-RAY EXAM OF SHOULDER: CPT | Performed by: ORTHOPAEDIC SURGERY

## 2022-06-16 PROCEDURE — 20610 DRAIN/INJ JOINT/BURSA W/O US: CPT | Performed by: ORTHOPAEDIC SURGERY

## 2022-06-16 PROCEDURE — 99214 OFFICE O/P EST MOD 30 MIN: CPT | Performed by: ORTHOPAEDIC SURGERY

## 2022-06-16 RX ADMIN — METHYLPREDNISOLONE ACETATE 80 MG: 80 INJECTION, SUSPENSION INTRA-ARTICULAR; INTRALESIONAL; INTRAMUSCULAR; SOFT TISSUE at 10:11

## 2022-06-16 RX ADMIN — LIDOCAINE HYDROCHLORIDE 2 ML: 10 INJECTION, SOLUTION EPIDURAL; INFILTRATION; INTRACAUDAL; PERINEURAL at 10:11

## 2022-06-17 RX ORDER — LIDOCAINE HYDROCHLORIDE 10 MG/ML
2 INJECTION, SOLUTION EPIDURAL; INFILTRATION; INTRACAUDAL; PERINEURAL
Status: COMPLETED | OUTPATIENT
Start: 2022-06-16 | End: 2022-06-16

## 2022-06-17 RX ORDER — METHYLPREDNISOLONE ACETATE 80 MG/ML
80 INJECTION, SUSPENSION INTRA-ARTICULAR; INTRALESIONAL; INTRAMUSCULAR; SOFT TISSUE
Status: COMPLETED | OUTPATIENT
Start: 2022-06-16 | End: 2022-06-16

## 2022-06-23 LAB
BUN SERPL-MCNC: 17 MG/DL (ref 8–27)
BUN/CREAT SERPL: 13 (ref 10–24)
CALCIUM SERPL-MCNC: 10 MG/DL (ref 8.6–10.2)
CHLORIDE SERPL-SCNC: 101 MMOL/L (ref 96–106)
CO2 SERPL-SCNC: 24 MMOL/L (ref 20–29)
CREAT SERPL-MCNC: 1.31 MG/DL (ref 0.76–1.27)
EGFRCR SERPLBLD CKD-EPI 2021: 59 ML/MIN/1.73
GLUCOSE SERPL-MCNC: 99 MG/DL (ref 65–99)
POTASSIUM SERPL-SCNC: 4.5 MMOL/L (ref 3.5–5.2)
SODIUM SERPL-SCNC: 139 MMOL/L (ref 134–144)

## 2022-07-27 ENCOUNTER — OFFICE VISIT (OUTPATIENT)
Dept: SLEEP MEDICINE | Facility: HOSPITAL | Age: 72
End: 2022-07-27

## 2022-07-27 VITALS — HEART RATE: 63 BPM | BODY MASS INDEX: 26.92 KG/M2 | OXYGEN SATURATION: 95 % | HEIGHT: 70 IN | WEIGHT: 188 LBS

## 2022-07-27 DIAGNOSIS — G47.30 SLEEP-DISORDERED BREATHING: Primary | ICD-10-CM

## 2022-07-27 PROCEDURE — G0463 HOSPITAL OUTPT CLINIC VISIT: HCPCS

## 2022-07-27 PROCEDURE — 99204 OFFICE O/P NEW MOD 45 MIN: CPT | Performed by: INTERNAL MEDICINE

## 2022-08-02 PROBLEM — G47.10 HYPERSOMNIA, UNSPECIFIED: Status: ACTIVE | Noted: 2022-08-02

## 2022-08-02 PROBLEM — G47.30 SLEEP-DISORDERED BREATHING: Status: ACTIVE | Noted: 2022-08-02

## 2022-08-26 ENCOUNTER — OFFICE VISIT (OUTPATIENT)
Dept: ORTHOPEDIC SURGERY | Facility: CLINIC | Age: 72
End: 2022-08-26

## 2022-08-26 VITALS — WEIGHT: 188.6 LBS | HEIGHT: 70 IN | TEMPERATURE: 98.1 F | BODY MASS INDEX: 27 KG/M2

## 2022-08-26 DIAGNOSIS — M48.061 SPINAL STENOSIS OF LUMBAR REGION WITHOUT NEUROGENIC CLAUDICATION: ICD-10-CM

## 2022-08-26 DIAGNOSIS — M41.129 ADOLESCENT SCOLIOSIS: ICD-10-CM

## 2022-08-26 DIAGNOSIS — M41.9 ACQUIRED SCOLIOSIS: Primary | ICD-10-CM

## 2022-08-26 PROCEDURE — 99213 OFFICE O/P EST LOW 20 MIN: CPT | Performed by: ORTHOPAEDIC SURGERY

## 2022-08-26 PROCEDURE — 72080 X-RAY EXAM THORACOLMB 2/> VW: CPT | Performed by: ORTHOPAEDIC SURGERY

## 2022-08-26 NOTE — PROGRESS NOTES
New patient or new problem visit    CC: Left buttock pain    HPI: He complains primarily of low back pain radiating to the left buttock.  No bowel or bladder complaints but had some previous right-sided pain when I saw him 2017.  No balance difficulties no bowel or bladder complaints.  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  He has a history of scoliosis.    PFSH: See attached.  Histade otherwise healthy.    ROS: As above    PE: BMI 27.  Good strength in the legs bilaterally.  Patellar reflexes are intact.  Achilles reflexes are intact.  Sensation appears grossly intact.  His coronal balance may be very slightly off to the right and this may be a stretch to say so to inspection.  Sagittal posture is good.    XRAY: Plain film x-rays show lumbar scoliosis which has increased slightly since prior images in 2017.  More importantly he may have up to centimeter loss of coronal balance to the right but there is slight rotation of the images it is that it is definitely no more than this.  Sagittal balance looks good.  Reviewed his MRI from before and he had very little neurologic compression and certainly no central spinal stenosis.    Other: n/a    Impression: Thoracolumbar scoliosis, lumbar spinal stenosis    Plan: He is doing very well mostly he wanted to establish a baseline and know what to watch out for.  I have recommended some physical therapy and I will schedule him for this so they can call him and he will work on some back and core exercises probably a few visits and instruction is all he will need.  If this buttock pain worsens he will call.  I have informed him I am retiring at the end of the year and Dr. 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

## 2022-09-06 ENCOUNTER — HOSPITAL ENCOUNTER (OUTPATIENT)
Dept: SLEEP MEDICINE | Facility: HOSPITAL | Age: 72
Discharge: HOME | End: 2022-09-06
Admitting: INTERNAL MEDICINE

## 2022-09-06 DIAGNOSIS — G47.30 SLEEP-DISORDERED BREATHING: ICD-10-CM

## 2022-09-06 PROCEDURE — 95806 SLEEP STUDY UNATT&RESP EFFT: CPT | Performed by: INTERNAL MEDICINE

## 2022-09-06 PROCEDURE — 95806 SLEEP STUDY UNATT&RESP EFFT: CPT

## 2022-09-26 ENCOUNTER — TELEPHONE (OUTPATIENT)
Dept: SLEEP MEDICINE | Facility: HOSPITAL | Age: 72
End: 2022-09-26

## 2022-09-26 NOTE — TELEPHONE ENCOUNTER
Lm on pts vm for pt to cb for results. Due to pts sleep being more restless than normal and sleeping less than normal,  is recommending repeat HST or overnight PSG with Tylenol PM.

## 2022-09-28 DIAGNOSIS — G47.10 HYPERSOMNIA, UNSPECIFIED: ICD-10-CM

## 2022-09-28 DIAGNOSIS — G47.30 SLEEP-DISORDERED BREATHING: Primary | ICD-10-CM

## 2022-10-10 ENCOUNTER — TELEPHONE (OUTPATIENT)
Dept: SLEEP MEDICINE | Facility: HOSPITAL | Age: 72
End: 2022-10-10

## 2022-10-11 ENCOUNTER — TELEPHONE (OUTPATIENT)
Dept: SLEEP MEDICINE | Facility: HOSPITAL | Age: 72
End: 2022-10-11

## 2022-10-11 DIAGNOSIS — G47.10 HYPERSOMNIA, UNSPECIFIED: ICD-10-CM

## 2022-10-11 DIAGNOSIS — G47.30 SLEEP-DISORDERED BREATHING: Primary | ICD-10-CM

## 2022-10-13 DIAGNOSIS — I10 BENIGN ESSENTIAL HTN: ICD-10-CM

## 2022-10-13 RX ORDER — IRBESARTAN 300 MG/1
TABLET ORAL
Qty: 90 TABLET | Refills: 1 | Status: SHIPPED | OUTPATIENT
Start: 2022-10-13

## 2022-10-20 ENCOUNTER — HOSPITAL ENCOUNTER (OUTPATIENT)
Dept: SLEEP MEDICINE | Facility: HOSPITAL | Age: 72
Discharge: HOME | End: 2022-10-20
Admitting: INTERNAL MEDICINE

## 2022-10-20 DIAGNOSIS — G47.30 SLEEP-DISORDERED BREATHING: ICD-10-CM

## 2022-10-20 DIAGNOSIS — G47.10 HYPERSOMNIA, UNSPECIFIED: ICD-10-CM

## 2022-10-20 PROCEDURE — 95810 POLYSOM 6/> YRS 4/> PARAM: CPT | Performed by: INTERNAL MEDICINE

## 2022-10-20 PROCEDURE — 95810 POLYSOM 6/> YRS 4/> PARAM: CPT

## 2022-10-25 ENCOUNTER — TELEPHONE (OUTPATIENT)
Dept: SLEEP MEDICINE | Facility: HOSPITAL | Age: 72
End: 2022-10-25

## 2022-11-10 ENCOUNTER — OFFICE VISIT (OUTPATIENT)
Dept: SLEEP MEDICINE | Facility: HOSPITAL | Age: 72
End: 2022-11-10

## 2022-11-10 VITALS — BODY MASS INDEX: 27.2 KG/M2 | HEIGHT: 70 IN | OXYGEN SATURATION: 99 % | HEART RATE: 75 BPM | WEIGHT: 190 LBS

## 2022-11-10 DIAGNOSIS — G47.33 OSA (OBSTRUCTIVE SLEEP APNEA): Primary | ICD-10-CM

## 2022-11-10 PROCEDURE — G0463 HOSPITAL OUTPT CLINIC VISIT: HCPCS

## 2022-11-10 PROCEDURE — 99213 OFFICE O/P EST LOW 20 MIN: CPT | Performed by: INTERNAL MEDICINE

## 2022-11-10 NOTE — PROGRESS NOTES
"Follow Up [facility] Note     Chief Complaint:  HANNAH     Primary Care Physician: Cathy Muhammad APRN    Interval History:   The patient is a 72 y.o. male  who I last saw 7/27/2022 and that note was reviewed.  The patient did attempt performing a home sleep study 9/6/2022.  However, technically, it was inadequate.  Subsequently, an overnight polysomnogram performed 10/20/2022.  Mild HANNAH identified for total sleep time with AHI 6.5 events per hour.  However, during REM, AHI severely abnormal at 31.4 events per hour.  The patient snored 43.2% of total sleep time.  The patient is here today for follow-up.    The patient goes to bed between 10 and 10:30 PM and awakens at 6 AM.  He will use the restroom during that time.    Review of Systems:    A complete review of systems was done and all were negative with the exception of the above    Social History:    Social History     Socioeconomic History   • Marital status:      Spouse name: Yecenia   • Number of children: 2   • Years of education: POST GR[employer]   Tobacco Use   • Smoking status: Never   • Smokeless tobacco: Never   Vaping Use   • Vaping Use: Never used   Substance and Sexual Activity   • Alcohol use: Yes     Alcohol/week: 2.0 standard drinks     Types: 1 Glasses of wine, 1 Cans of beer per week     Comment: 3-5 times a week   • Drug use: Never   • Sexual activity: Yes     Partners: Female       Allergies:  Patient has no known allergies.     Medication Review:  Reviewed.      Vital Signs:    Vitals:    11/10/22 0829   Pulse: 75   SpO2: 99%   Weight: 86.2 kg (190 lb)   Height: 177.8 cm (70\")     Body mass index is 27.26 kg/m².    Physical Exam:    Constitutional:  Well developed 72 y.o. male that appears in no apparent distress.  Awake & oriented times 3.  Normal mood with normal recent and remote memory and normal judgement.  Eyes:  Conjunctivae normal.  Oropharynx: Previously, moist mucous membranes without exudate and a large tongue and normal " uvula and mild narrowing of the posterior pharyngeal opening and class II-3 Mallampati airway, patient is wearing a facemask.    Self-administered [place] Sleepiness Scale test results: 2  0-5 Lower normal daytime sleepiness  6-10 Higher normal daytime sleepiness  11-12 Mild, 13-15 Moderate, & 16-24 Severe excessive daytime sleepiness     I have reviewed the home sleep study results with him.      Impression:   Mild obstructive sleep apnea for total sleep time, severe during REM, by overnight polysomnogram 10/20/2022, weight 188 pounds. The patient has no complaints of hypersomnolence.    Plan:  Good sleep hygiene measures should be maintained.  Weight loss would be beneficial in this patient who is overweight by Body mass index is 27.26 kg/m²..      As stated, I reviewed all with the patient.  The patient does have a twin brother who uses CPAP for HANNAH.  I reviewed risk and benefits of an oral appliance, Inspire device, and auto CPAP.  After discussing pros and cons of all, he favors evaluating and an oral appliance.  I agree with that selection and arrangements will be made for further evaluation.  Once oral appliance worn regularly and positioned correctly, repeat home sleep study should be attempted.    I answered all of the patient's questions.  The patient will call for any problems and will follow up after obtaining an oral appliance or if he is deemed not a candidate.        El Vidal MD  Sleep Medicine  11/10/22  08:31 EST

## 2022-11-13 PROBLEM — G47.33 OSA (OBSTRUCTIVE SLEEP APNEA): Status: ACTIVE | Noted: 2022-10-20

## 2022-12-12 ENCOUNTER — OFFICE VISIT (OUTPATIENT)
Dept: INTERNAL MEDICINE | Facility: CLINIC | Age: 72
End: 2022-12-12

## 2022-12-12 VITALS
HEIGHT: 70 IN | HEART RATE: 59 BPM | BODY MASS INDEX: 27.6 KG/M2 | OXYGEN SATURATION: 100 % | TEMPERATURE: 97.5 F | SYSTOLIC BLOOD PRESSURE: 132 MMHG | DIASTOLIC BLOOD PRESSURE: 82 MMHG | WEIGHT: 192.8 LBS

## 2022-12-12 DIAGNOSIS — R73.09 ELEVATED GLUCOSE: ICD-10-CM

## 2022-12-12 DIAGNOSIS — E78.00 PURE HYPERCHOLESTEROLEMIA: Chronic | ICD-10-CM

## 2022-12-12 DIAGNOSIS — M75.42 IMPINGEMENT SYNDROME OF LEFT SHOULDER: ICD-10-CM

## 2022-12-12 DIAGNOSIS — I10 BENIGN ESSENTIAL HTN: Primary | Chronic | ICD-10-CM

## 2022-12-12 DIAGNOSIS — L72.0 EPIDERMOID CYST OF SKIN OF SCROTUM: ICD-10-CM

## 2022-12-12 DIAGNOSIS — Z12.5 SCREENING FOR PROSTATE CANCER: ICD-10-CM

## 2022-12-12 DIAGNOSIS — M75.41 IMPINGEMENT SYNDROME OF RIGHT SHOULDER: ICD-10-CM

## 2022-12-12 DIAGNOSIS — E03.9 ACQUIRED HYPOTHYROIDISM: Chronic | ICD-10-CM

## 2022-12-12 LAB
ALBUMIN SERPL-MCNC: 4.8 G/DL (ref 3.5–5.2)
ALBUMIN/GLOB SERPL: 2.3 G/DL
ALP SERPL-CCNC: 83 U/L (ref 39–117)
ALT SERPL-CCNC: 16 U/L (ref 1–41)
AST SERPL-CCNC: 18 U/L (ref 1–40)
BASOPHILS # BLD AUTO: 0.06 10*3/MM3 (ref 0–0.2)
BASOPHILS NFR BLD AUTO: 1.3 % (ref 0–1.5)
BILIRUB SERPL-MCNC: 0.4 MG/DL (ref 0–1.2)
BUN SERPL-MCNC: 17 MG/DL (ref 8–23)
BUN/CREAT SERPL: 13.6 (ref 7–25)
CALCIUM SERPL-MCNC: 10.3 MG/DL (ref 8.6–10.5)
CHLORIDE SERPL-SCNC: 101 MMOL/L (ref 98–107)
CHOLEST SERPL-MCNC: 144 MG/DL (ref 0–200)
CO2 SERPL-SCNC: 29.2 MMOL/L (ref 22–29)
CREAT SERPL-MCNC: 1.25 MG/DL (ref 0.76–1.27)
EGFRCR SERPLBLD CKD-EPI 2021: 61.6 ML/MIN/1.73
EOSINOPHIL # BLD AUTO: 0.31 10*3/MM3 (ref 0–0.4)
EOSINOPHIL NFR BLD AUTO: 6.7 % (ref 0.3–6.2)
ERYTHROCYTE [DISTWIDTH] IN BLOOD BY AUTOMATED COUNT: 14.5 % (ref 12.3–15.4)
GLOBULIN SER CALC-MCNC: 2.1 GM/DL
GLUCOSE SERPL-MCNC: 96 MG/DL (ref 65–99)
HBA1C MFR BLD: 5.9 % (ref 4.8–5.6)
HCT VFR BLD AUTO: 40.9 % (ref 37.5–51)
HDLC SERPL-MCNC: 50 MG/DL (ref 40–60)
HGB BLD-MCNC: 12.8 G/DL (ref 13–17.7)
IMM GRANULOCYTES # BLD AUTO: 0.01 10*3/MM3 (ref 0–0.05)
IMM GRANULOCYTES NFR BLD AUTO: 0.2 % (ref 0–0.5)
LDLC SERPL CALC-MCNC: 75 MG/DL (ref 0–100)
LYMPHOCYTES # BLD AUTO: 1.84 10*3/MM3 (ref 0.7–3.1)
LYMPHOCYTES NFR BLD AUTO: 39.7 % (ref 19.6–45.3)
MCH RBC QN AUTO: 26.7 PG (ref 26.6–33)
MCHC RBC AUTO-ENTMCNC: 31.3 G/DL (ref 31.5–35.7)
MCV RBC AUTO: 85.4 FL (ref 79–97)
MONOCYTES # BLD AUTO: 0.46 10*3/MM3 (ref 0.1–0.9)
MONOCYTES NFR BLD AUTO: 9.9 % (ref 5–12)
NEUTROPHILS # BLD AUTO: 1.96 10*3/MM3 (ref 1.7–7)
NEUTROPHILS NFR BLD AUTO: 42.2 % (ref 42.7–76)
NRBC BLD AUTO-RTO: 0 /100 WBC (ref 0–0.2)
PLATELET # BLD AUTO: 423 10*3/MM3 (ref 140–450)
POTASSIUM SERPL-SCNC: 4.7 MMOL/L (ref 3.5–5.2)
PROT SERPL-MCNC: 6.9 G/DL (ref 6–8.5)
PSA SERPL-MCNC: 2.65 NG/ML (ref 0–4)
RBC # BLD AUTO: 4.79 10*6/MM3 (ref 4.14–5.8)
SODIUM SERPL-SCNC: 139 MMOL/L (ref 136–145)
TRIGL SERPL-MCNC: 101 MG/DL (ref 0–150)
TSH SERPL DL<=0.005 MIU/L-ACNC: 2.46 UIU/ML (ref 0.27–4.2)
VLDLC SERPL CALC-MCNC: 19 MG/DL (ref 5–40)
WBC # BLD AUTO: 4.64 10*3/MM3 (ref 3.4–10.8)

## 2022-12-12 PROCEDURE — 99214 OFFICE O/P EST MOD 30 MIN: CPT | Performed by: NURSE PRACTITIONER

## 2022-12-12 NOTE — PROGRESS NOTES
"Chief Complaint  Hypertension (6 month follow up), Hypothyroidism, and Insomnia  Subjective        Anoop Montenegro presents to [facility]  History of Present Illness    The patient presents for a 6-month follow-up regarding hypertension, hypothyroidism and hypercholesterolemia.    He recently completed a sleep study with Dr. Vidal and is in the process of getting an oral appliance for treatment for sleep apnea. He states he is taking Tylenol PM night for mgmt of insomnia. The patient reports he is taking zolpidem approximately every 3 months. We have discussed safety concerns with medication and importance of using it sparingly.     The patient reports he is established with Emma Ludwig, Dermatology. He has an appt next month for evaluation of skin lesions.    He reports when ambulating up the stairs he is experiences dyspnea. He has to stop to rest after ambulating 2 sets of 12 stairs. He states he can utilize the elliptical for 10 or 15 minutes. He states he is active but not doing cardio exercises regularly.     He reports pain in his bilateral shoulders that is rotator cuff in origin. He states he had injections in 06/16/2022 by Dr. Mcduffie.     The patient reports lesions on his scrotum for 1 or 2 years. He denies pain or any changes in lesions.    He has done meditation for 3 or 4 years which has been helpful in stress management.    Objective   Vital Signs:  /82 (BP Location: Left arm, Patient Position: Sitting, Cuff Size: Adult)   Pulse 59   Temp 97.5 °F (36.4 °C) (Temporal)   Ht 177.8 cm (70\")   Wt 87.5 kg (192 lb 12.8 oz)   SpO2 100%   BMI 27.66 kg/m²   Estimated body mass index is 27.66 kg/m² as calculated from the following:    Height as of this encounter: 177.8 cm (70\").    Weight as of this encounter: 87.5 kg (192 lb 12.8 oz).          Physical Exam  Constitutional:       Appearance: He is well-developed. He is not ill-appearing.   HENT:      Head: " Normocephalic.      Right Ear: Decreased hearing noted.      Left Ear: Decreased hearing noted.      Ears:      Comments: Bilateral hearing aides     Nose: Nose normal. No nasal deformity, mucosal edema or rhinorrhea.      Right Sinus: No maxillary sinus tenderness or frontal sinus tenderness.      Left Sinus: No maxillary sinus tenderness or frontal sinus tenderness.      Mouth/Throat:      Dentition: Normal dentition.   Eyes:      General: Lids are normal.         Right eye: No discharge.         Left eye: No discharge.      Conjunctiva/sclera: Conjunctivae normal.      Right eye: No exudate.     Left eye: No exudate.  Neck:      Thyroid: No thyroid mass or thyromegaly.      Vascular: No carotid bruit.      Trachea: Trachea normal.   Cardiovascular:      Rate and Rhythm: Regular rhythm.      Pulses: Normal pulses.      Heart sounds: Normal heart sounds. No murmur heard.  Pulmonary:      Effort: No respiratory distress.      Breath sounds: Normal breath sounds. No decreased breath sounds, wheezing, rhonchi or rales.   Abdominal:      General: Bowel sounds are normal.      Palpations: Abdomen is soft.      Tenderness: There is no abdominal tenderness.   Genitourinary:     Comments: Epidermoid cysts on scrotum  Musculoskeletal:      Cervical back: Normal range of motion. No edema.   Lymphadenopathy:      Head:      Right side of head: No submental, submandibular, tonsillar, preauricular, posterior auricular or occipital adenopathy.      Left side of head: No submental, submandibular, tonsillar, preauricular, posterior auricular or occipital adenopathy.   Skin:     General: Skin is warm and dry.      Nails: There is no clubbing.   Neurological:      Mental Status: He is alert.   Psychiatric:         Behavior: Behavior is cooperative.        Result Review :  The following data was reviewed by: DONNIE Narayan on 12/12/2022:  Common labs    Common Labs 6/6/22 6/6/22 6/6/22 6/22/22 12/12/22 12/12/22 12/12/22  12/12/22 12/12/22    0925 0925 0925  0929 0929 0929 0929 0929   Glucose  95  99  96      BUN  20  17  17      Creatinine  1.32 (A)  1.31 (A)  1.25      Sodium  140  139  139      Potassium  5.7 (A)  4.5  4.7      Chloride  103  101  101      Calcium  10.5 (A)  10.0  10.3      Total Protein  7.1    6.9      Albumin  4.7    4.80      Total Bilirubin  0.4    0.4      Alkaline Phosphatase  85    83      AST (SGOT)  20    18      ALT (SGPT)  17    16      WBC 4.4    4.64       Hemoglobin 14.0    12.8 (A)       Hematocrit 44.0    40.9       Platelets 371    423       Total Cholesterol   142    144     Triglycerides   87    101     HDL Cholesterol   53    50     LDL Cholesterol    72    75     Hemoglobin A1C        5.90 (A)    PSA         2.650   (A) Abnormal value       Comments are available for some flowsheets but are not being displayed.           Data reviewed: Consultant notes ortho 6/2022            Assessment and Plan   Diagnoses and all orders for this visit:    1. Benign essential HTN (Primary)  Assessment & Plan:  Hypertension is unchanged.  Continue current treatment regimen.  Dietary sodium restriction.  Blood pressure will be reassessed at the next regular appointment   He will continue irbesartan which he is tolerating well.    Orders:  -     CBC & Differential    2. Pure hypercholesterolemia  Assessment & Plan:  He denies myalgias with atorvastatin which he will continue along with a low-fat, low-cholesterol diet.      Orders:  -     Comprehensive Metabolic Panel  -     Lipid Panel    3. Acquired hypothyroidism  Comments:      Assessment & Plan:  He is currently managed on Synthroid 112 mcg daily-recheck TSH.    Orders:  -     TSH    4. Impingement syndrome of left shoulder  Assessment & Plan:  He received a steroid injection in bilateral shoulders 6/2022 by Dr. Mcduffie with improvement in sx      5. Impingement syndrome of right shoulder  Assessment & Plan:  He received a steroid injection in bilateral  shoulders 6/2022 by Dr. Mcduffie with improvement in sx      6. Elevated glucose  -     Hemoglobin A1c    7. Epidermoid cyst of skin of scrotum  Assessment & Plan:  Pt reassured re: benign nature of lesions, continue to monitor.      8. Screening for prostate cancer  -     PSA Screen      Transcribed from ambient dictation for DONNIE Narayan by Honey Reyna.  12/12/22   12:14 EST    Patient or patient representative verbalized consent to the visit recording.  I have personally performed the services described in this document as transcribed by the above individual, and it is both accurate and complete.         Follow Up   Return in about 6 months (around 6/12/2023).  Patient was given instructions and counseling regarding his condition or for health maintenance advice. Please see specific information pulled into the AVS if appropriate.

## 2022-12-13 PROBLEM — M75.42 IMPINGEMENT SYNDROME OF LEFT SHOULDER: Chronic | Status: ACTIVE | Noted: 2022-12-13

## 2022-12-13 PROBLEM — M75.41 IMPINGEMENT SYNDROME OF RIGHT SHOULDER: Status: ACTIVE | Noted: 2022-12-13

## 2022-12-13 PROBLEM — M75.41 IMPINGEMENT SYNDROME OF RIGHT SHOULDER: Chronic | Status: ACTIVE | Noted: 2022-12-13

## 2022-12-13 PROBLEM — M75.42 IMPINGEMENT SYNDROME OF LEFT SHOULDER: Status: ACTIVE | Noted: 2022-12-13

## 2022-12-13 PROBLEM — L72.0 EPIDERMOID CYST OF SKIN OF SCROTUM: Status: ACTIVE | Noted: 2022-12-13

## 2022-12-13 NOTE — ASSESSMENT & PLAN NOTE
He received a steroid injection in bilateral shoulders 6/2022 by Dr. Mcduffie with improvement in sx

## 2022-12-13 NOTE — ASSESSMENT & PLAN NOTE
Hypertension is unchanged.  Continue current treatment regimen.  Dietary sodium restriction.  Blood pressure will be reassessed at the next regular appointment   He will continue irbesartan which he is tolerating well.

## 2022-12-19 ENCOUNTER — OFFICE VISIT (OUTPATIENT)
Dept: ORTHOPEDIC SURGERY | Facility: CLINIC | Age: 72
End: 2022-12-19

## 2022-12-19 VITALS — HEIGHT: 70 IN | WEIGHT: 194.4 LBS | TEMPERATURE: 97.6 F | BODY MASS INDEX: 27.83 KG/M2

## 2022-12-19 DIAGNOSIS — M75.40 IMPINGEMENT SYNDROME OF SHOULDER REGION, UNSPECIFIED LATERALITY: Primary | ICD-10-CM

## 2022-12-19 PROCEDURE — 20610 DRAIN/INJ JOINT/BURSA W/O US: CPT | Performed by: ORTHOPAEDIC SURGERY

## 2022-12-19 RX ADMIN — METHYLPREDNISOLONE ACETATE 80 MG: 80 INJECTION, SUSPENSION INTRA-ARTICULAR; INTRALESIONAL; INTRAMUSCULAR; SOFT TISSUE at 14:23

## 2022-12-19 RX ADMIN — METHYLPREDNISOLONE ACETATE 80 MG: 80 INJECTION, SUSPENSION INTRA-ARTICULAR; INTRALESIONAL; INTRAMUSCULAR; SOFT TISSUE at 14:24

## 2022-12-19 NOTE — PROGRESS NOTES
Patient: Anoop Montenegro  YOB: 1950  Date of Service: 12/19/2022    Chief Complaints: Bilateral shoulder pain    Subjective:    History of Present Illness: Pt is seen in the office today with complaints of bilateral shoulder pain I just saw him in June we injected him he states he got relief till about a month ago he had injections prior that they lasted about a year no no history injury change in activity symptoms are moderate intermittent aching worse with activity somewhat better with rest.          Allergies: No Known Allergies    Medications:   Home Medications:  Current Outpatient Medications on File Prior to Visit   Medication Sig   • atorvastatin (LIPITOR) 20 MG tablet TAKE 1 TABLET DAILY   • irbesartan (AVAPRO) 300 MG tablet TAKE 1 TABLET DAILY   • levothyroxine (SYNTHROID, LEVOTHROID) 112 MCG tablet TAKE 1 TABLET DAILY (DOSE  CHANGED FROM 125MCG TO     112MCG, PLEASE DISCONTINUE 125MCG)   • meloxicam (MOBIC) 15 MG tablet Take 1 tablet by mouth Daily As Needed for Moderate Pain . Take with food   • sildenafil (VIAGRA) 100 MG tablet Take 1 tablet by mouth Daily As Needed for Erectile Dysfunction.   • zolpidem (AMBIEN) 10 MG tablet Take 1 tablet by mouth At Night As Needed for Sleep.     No current facility-administered medications on file prior to visit.     Current Medications:  Scheduled Meds:  Continuous Infusions:No current facility-administered medications for this visit.    PRN Meds:.    I have reviewed the patient's medical history in detail and updated the computerized patient record.  Review and summarization of old records include:    Past Medical History:   Diagnosis Date   • Allergic rhinitis    • Anxiety 06/2017    ((Pt Qnr Sub: remove per patient)) mild, occational   • Arthritis    • Cancer (HCC)     skin   • Cataract 1/1/2020    Surgery around 10/2021   • Cervical spondylosis with myelopathy    • Colon polyp 1/1/2012    Last colonoscopy 3/2022   • Elevated cholesterol    • FHx:  colonic polyps    • GERD (gastroesophageal reflux disease)    • History of colonic polyps    • History of kidney stones    • HL (hearing loss) 2017    Very little in left ear   • Hyperlipidemia    • Hypertension     Benign Essential   • Hypothyroidism    • Insomnia    • Low back pain 2017    pain is worse in 2017. [place]   • Malaise and fatigue    • HANNAH (obstructive sleep apnea) 10/20/2022    Home sleep study.  Weight 188 pounds.  Mild HANNAH with AHI 6.5 events per hour for total sleep time.  During REM, severe HANNAH with AHI 31.4 events per hour.  The patient snored 43.2% of total sleep time.   • Pure hypercholesterolemia    • Scoliosis 2017    see recent x-rays. 3,4,5 vertabraes        Past Surgical History:   Procedure Laterality Date   • CATARACT EXTRACTION, BILATERAL Bilateral 10/2021   • COLONOSCOPY N/A 2011   • COLONOSCOPY N/A 2005   • COLONOSCOPY N/A 11/15/2013   • CYST REMOVAL     • FRACTURE SURGERY      right thumb   • HAND SURGERY Left     3rd finger   • SKIN BIOPSY      scalp   • VASECTOMY          Social History     Occupational History   • Occupation: RETIRED   Tobacco Use   • Smoking status: Never   • Smokeless tobacco: Never   Vaping Use   • Vaping Use: Never used   Substance and Sexual Activity   • Alcohol use: Yes     Alcohol/week: 2.0 standard drinks     Types: 1 Glasses of wine, 1 Cans of beer per week     Comment: 3-5 times a week   • Drug use: Never   • Sexual activity: Yes     Partners: Female      Social History     Social History Narrative    LIVES WITH SPOUSE        Family History   Problem Relation Age of Onset   • Hypertension Mother    • Heart failure Mother    • Hearing loss Mother    • Hyperlipidemia Mother    • Aortic aneurysm Father    • Arthritis Father            • Heart disease Father         . 3 heart attacks   • Hyperlipidemia Father    • Liver cancer Sister    • Breast cancer Sister    • Hyperlipidemia Sister          from breast  cancer, 2018   • Prostate cancer Brother    • Heart disease Brother         Stint implemented.   • Hypertension Brother    • Sleep apnea Brother    • Hypothyroidism Brother        ROS: 14 point review of systems was performed and was negative except for documented findings in HPI and today's encounter.     Allergies: No Known Allergies  Constitutional:  Denies fever, shaking or chills   Eyes:  Denies change in visual acuity   HENT:  Denies nasal congestion or sore throat   Respiratory:  Denies cough or shortness of breath   Cardiovascular:  Denies chest pain or severe LE edema   GI:  Denies abdominal pain, nausea, vomiting, bloody stools or diarrhea   Musculoskeletal:  Numbness, tingling, or loss of motor function only as noted above in history of present illness.  : Denies painful urination or hematuria  Integument:  Denies rash, lesion or ulceration   Neurologic:  Denies headache or focal weakness  Endocrine:  Denies lymphadenopathy  Psych:  Denies confusion or change in mental status   Hem:  Denies active bleeding      Physical Exam: 72 y.o. male  Wt Readings from Last 3 Encounters:   12/12/22 87.5 kg (192 lb 12.8 oz)   11/10/22 86.2 kg (190 lb)   08/26/22 85.5 kg (188 lb 9.6 oz)       There is no height or weight on file to calculate BMI.  No height and weight on file for this encounter.  There were no vitals filed for this visit.  Vital signs reviewed.   General Appearance:    Alert, cooperative, in no acute distress                    Ortho exam    Physical exam of the right and left shoulder reveals no overlying skin changes no lymphedema no lymphadenopathy.  Patient has active flexion 180 with mild symptoms abduction is similar external rotation is to 50 and internal rotation to the upper lumbar spine with mild symptoms.  Patient has good rotator cuff strength 4+ over 5 with isometric strength testing with pain.  Patient has a positive impingement and a positive Harry sign.  Patient has good cervical  range of motion which is full and asymptomatic no radicular symptoms.  Patient has a normal elbow exam.  Good distal pulses are present  Patient has pain with overhead activity and a positive Neer sign and a positive empty can sign , a positive drop arm and a definitive painful arc         .time    Assessment: Bilateral shoulder pain at this all seems more impingement he responds to injections he is done physical therapy I think it is reasonable to inject him 1 more time should his symptoms return I think we would be obligated to pursue a little more of a work-up specifically an MRI of the more symptomatic 1  Plan:   Follow up as indicated.  Ice, elevate, and rest as needed.  Discussed conservative measures of pain control including ice, bracing.  Also talked about the importance of strengthening   Carlotta Mcduffie M.D.    Large Joint Arthrocentesis: R subacromial bursa  Date/Time: 12/19/2022 2:23 PM  Consent given by: patient  Site marked: site marked  Timeout: Immediately prior to procedure a time out was called to verify the correct patient, procedure, equipment, support staff and site/side marked as required   Supporting Documentation  Indications: pain   Procedure Details  Location: shoulder - R subacromial bursa  Preparation: Patient was prepped and draped in the usual sterile fashion  Needle gauge: 21G.  Approach: posterior  Medications administered: 80 mg methylPREDNISolone acetate 80 MG/ML; 2 mL lidocaine (cardiac)  Patient tolerance: patient tolerated the procedure well with no immediate complications    Large Joint Arthrocentesis: L subacromial bursa  Date/Time: 12/19/2022 2:24 PM  Consent given by: patient  Site marked: site marked  Timeout: Immediately prior to procedure a time out was called to verify the correct patient, procedure, equipment, support staff and site/side marked as required   Supporting Documentation  Indications: pain   Procedure Details  Location: shoulder - L subacromial  bursa  Preparation: Patient was prepped and draped in the usual sterile fashion  Needle gauge: 21G.  Approach: posterior  Medications administered: 80 mg methylPREDNISolone acetate 80 MG/ML; 2 mL lidocaine (cardiac)  Patient tolerance: patient tolerated the procedure well with no immediate complications

## 2022-12-20 RX ORDER — METHYLPREDNISOLONE ACETATE 80 MG/ML
80 INJECTION, SUSPENSION INTRA-ARTICULAR; INTRALESIONAL; INTRAMUSCULAR; SOFT TISSUE
Status: COMPLETED | OUTPATIENT
Start: 2022-12-19 | End: 2022-12-19

## 2022-12-30 RX ORDER — ATORVASTATIN CALCIUM 20 MG/1
20 TABLET, FILM COATED ORAL DAILY
Qty: 90 TABLET | Refills: 1 | Status: SHIPPED | OUTPATIENT
Start: 2022-12-30

## 2022-12-30 NOTE — TELEPHONE ENCOUNTER
Caller: Moni Anoop    Relationship: Self    Best call back number: [phone]    Requested Prescriptions:   Requested Prescriptions     Pending Prescriptions Disp Refills   • atorvastatin (LIPITOR) 20 MG tablet 90 tablet 3     Sig: Take 1 tablet by mouth Daily.        [facility]arm[facility] [address] - [address] [facility] AT PORTAL TO REGISTERED [facility] - [phone]  - [phone]      Additional details provided by patient: 7 DAY SUPPLY    Does the patient have less than a 3 day supply:  [] Yes  [x] No    Would you like a call back once the refill request has been completed: [] Yes [x] No    If the office needs to give you a call back, can they leave a voicemail: [x] Yes [] No    Rosemarie Mccallum Rep   12/30/22 10:04 EST

## 2023-02-17 RX ORDER — LEVOTHYROXINE SODIUM 112 UG/1
TABLET ORAL
Qty: 90 TABLET | Refills: 3 | Status: SHIPPED | OUTPATIENT
Start: 2023-02-17

## 2023-04-11 DIAGNOSIS — I10 BENIGN ESSENTIAL HTN: ICD-10-CM

## 2023-04-11 RX ORDER — IRBESARTAN 300 MG/1
TABLET ORAL
Qty: 90 TABLET | Refills: 1 | Status: SHIPPED | OUTPATIENT
Start: 2023-04-11

## 2023-04-20 ENCOUNTER — OFFICE VISIT (OUTPATIENT)
Dept: INTERNAL MEDICINE | Facility: CLINIC | Age: 73
End: 2023-04-20
Payer: MEDICARE

## 2023-04-20 VITALS
DIASTOLIC BLOOD PRESSURE: 82 MMHG | SYSTOLIC BLOOD PRESSURE: 134 MMHG | OXYGEN SATURATION: 97 % | WEIGHT: 189.2 LBS | HEIGHT: 70 IN | BODY MASS INDEX: 27.09 KG/M2 | TEMPERATURE: 97.3 F | HEART RATE: 69 BPM

## 2023-04-20 DIAGNOSIS — E78.00 PURE HYPERCHOLESTEROLEMIA: Chronic | ICD-10-CM

## 2023-04-20 DIAGNOSIS — R07.89 ATYPICAL CHEST PAIN: Primary | ICD-10-CM

## 2023-04-20 PROCEDURE — 99214 OFFICE O/P EST MOD 30 MIN: CPT | Performed by: NURSE PRACTITIONER

## 2023-04-20 PROCEDURE — 1159F MED LIST DOCD IN RCRD: CPT | Performed by: NURSE PRACTITIONER

## 2023-04-20 PROCEDURE — 3079F DIAST BP 80-89 MM HG: CPT | Performed by: NURSE PRACTITIONER

## 2023-04-20 PROCEDURE — 3075F SYST BP GE 130 - 139MM HG: CPT | Performed by: NURSE PRACTITIONER

## 2023-04-20 PROCEDURE — 1160F RVW MEDS BY RX/DR IN RCRD: CPT | Performed by: NURSE PRACTITIONER

## 2023-04-20 RX ORDER — ASPIRIN 81 MG/1
81 TABLET ORAL DAILY
Qty: 30 TABLET | Refills: 0
Start: 2023-04-20

## 2023-04-22 NOTE — PROGRESS NOTES
"Chief Complaint  Fatigue and Shortness of Breath    Subjective        Anoop Montenegro presents to [facility] due to increased fatigue with shortness of breath.    History of Present Illness  He presents due to decreased endurance which has noticed over the past 3 months. He c/o increased dyspnea with walking up stairs, often stopping at the top to take a deep breath.  He has also noticed a similar episode with carryings rocks recently, had to stop wilmer 100 feet due to dyspnea.  He is on the elliptical regularly which he has done for the past 10 years, notes elevated pulse of 160 today (typically up to 130). Denies chest pain and/or shortness of breath.  He does have a family hx (brother) of heart disease. LDL 75 with recent labs (managed on atorvastatin).   Shortness of Breath  This is a recurrent problem. The current episode started more than 1 month ago. The problem occurs intermittently. The problem has been waxing and waning. The average episode lasts 15 Minutes. Associated symptoms include orthopnea. The symptoms are aggravated by occupational exposure and exercise.       Objective   Vital Signs:  /82 (BP Location: Left arm, Patient Position: Sitting, Cuff Size: Adult)   Pulse 69   Temp 97.3 °F (36.3 °C) (Temporal)   Ht 177.8 cm (70\")   Wt 85.8 kg (189 lb 3.2 oz)   SpO2 97%   BMI 27.15 kg/m²   Estimated body mass index is 27.15 kg/m² as calculated from the following:    Height as of this encounter: 177.8 cm (70\").    Weight as of this encounter: 85.8 kg (189 lb 3.2 oz).             Physical Exam  Constitutional:       Appearance: He is well-developed. He is not ill-appearing.   HENT:      Head: Normocephalic.      Right Ear: Hearing, tympanic membrane and external ear normal.      Left Ear: Hearing, tympanic membrane and external ear normal.      Nose: Nose normal. No nasal deformity, mucosal edema or rhinorrhea.      Right Sinus: No maxillary sinus tenderness or frontal " sinus tenderness.      Left Sinus: No maxillary sinus tenderness or frontal sinus tenderness.      Mouth/Throat:      Dentition: Normal dentition.   Eyes:      General: Lids are normal.         Right eye: No discharge.         Left eye: No discharge.      Conjunctiva/sclera: Conjunctivae normal.      Right eye: No exudate.     Left eye: No exudate.  Neck:      Thyroid: No thyroid mass or thyromegaly.      Vascular: No carotid bruit.      Trachea: Trachea normal.   Cardiovascular:      Rate and Rhythm: Regular rhythm.      Pulses: Normal pulses.      Heart sounds: Normal heart sounds. No murmur heard.  Pulmonary:      Effort: No respiratory distress.      Breath sounds: Normal breath sounds. No decreased breath sounds, wheezing, rhonchi or rales.   Abdominal:      General: Bowel sounds are normal.      Palpations: Abdomen is soft.      Tenderness: There is no abdominal tenderness.   Musculoskeletal:      Cervical back: Normal range of motion. No edema.   Lymphadenopathy:      Head:      Right side of head: No submental, submandibular, tonsillar, preauricular, posterior auricular or occipital adenopathy.      Left side of head: No submental, submandibular, tonsillar, preauricular, posterior auricular or occipital adenopathy.   Skin:     General: Skin is warm and dry.      Nails: There is no clubbing.   Neurological:      Mental Status: He is alert.   Psychiatric:         Behavior: Behavior is cooperative.        Result Review :  The following data was reviewed by: DONNIE Narayan on 04/20/2023:  Common labs        6/6/2022    09:25 6/22/2022    09:27 12/12/2022    09:29   Common Labs   Glucose 95   99   96     BUN 20   17   17     Creatinine 1.32   1.31   1.25     Sodium 140   139   139     Potassium 5.7   4.5   4.7     Chloride 103   101   101     Calcium 10.5   10.0   10.3     Total Protein 7.1    6.9     Albumin 4.7    4.80     Total Bilirubin 0.4    0.4     Alkaline Phosphatase 85    83     AST (SGOT) 20     18     ALT (SGPT) 17    16     WBC 4.4    4.64     Hemoglobin 14.0    12.8     Hematocrit 44.0    40.9     Platelets 371    423     Total Cholesterol 142    144     Triglycerides 87    101     HDL Cholesterol 53    50     LDL Cholesterol  72    75     Hemoglobin A1C   5.90     PSA   2.650                    Assessment and Plan   Diagnoses and all orders for this visit:    1. Atypical chest pain (Primary)  -     Treadmill Stress Test; Future  -     aspirin 81 MG EC tablet; Take 1 tablet by mouth Daily.  Dispense: 30 tablet; Refill: 0    2. Pure hypercholesterolemia  Assessment & Plan:  LDL 75 with recent labs, continue atorvastatin    He has noticed decreased endurance over the past 3 months with a family hx (brother) of CAD. He is on statin therapy, I have asked him to add asa 81 mg daily for now. Will also obtain stress test to further evaluate. He is advised to report to ER with worsening symptoms.       Follow Up   Return if symptoms worsen or fail to improve, for Next scheduled follow up.  Patient was given instructions and counseling regarding his condition or for health maintenance advice. Please see specific information pulled into the AVS if appropriate.       Answers for HPI/ROS submitted by the patient on 4/13/2023  What is the primary reason for your visit?: Shortness of Breath

## 2023-04-27 ENCOUNTER — TRANSCRIBE ORDERS (OUTPATIENT)
Dept: SLEEP MEDICINE | Facility: HOSPITAL | Age: 73
End: 2023-04-27
Payer: MEDICARE

## 2023-04-27 DIAGNOSIS — G47.33 OSA (OBSTRUCTIVE SLEEP APNEA): Primary | ICD-10-CM

## 2023-05-03 ENCOUNTER — HOSPITAL ENCOUNTER (OUTPATIENT)
Dept: CARDIOLOGY | Facility: HOSPITAL | Age: 73
Discharge: HOME | End: 2023-05-03
Admitting: NURSE PRACTITIONER
Payer: MEDICARE

## 2023-05-03 DIAGNOSIS — R07.89 ATYPICAL CHEST PAIN: ICD-10-CM

## 2023-05-03 LAB
BH CV STRESS BP STAGE 1: NORMAL
BH CV STRESS BP STAGE 2: NORMAL
BH CV STRESS BP STAGE 3: NORMAL
BH CV STRESS DURATION MIN STAGE 1: 3
BH CV STRESS DURATION MIN STAGE 2: 3
BH CV STRESS DURATION MIN STAGE 3: 1
BH CV STRESS DURATION SEC STAGE 1: 0
BH CV STRESS DURATION SEC STAGE 2: 0
BH CV STRESS DURATION SEC STAGE 3: 0
BH CV STRESS GRADE STAGE 1: 10
BH CV STRESS GRADE STAGE 2: 12
BH CV STRESS GRADE STAGE 3: 14
BH CV STRESS HR STAGE 1: 119
BH CV STRESS HR STAGE 2: 143
BH CV STRESS HR STAGE 3: 146
BH CV STRESS METS STAGE 1: 5
BH CV STRESS METS STAGE 2: 7.5
BH CV STRESS METS STAGE 3: 10
BH CV STRESS PROTOCOL 1: NORMAL
BH CV STRESS RECOVERY BP: NORMAL MMHG
BH CV STRESS RECOVERY HR: 88 BPM
BH CV STRESS SPEED STAGE 1: 1.7
BH CV STRESS SPEED STAGE 2: 2.5
BH CV STRESS SPEED STAGE 3: 3.4
BH CV STRESS STAGE 1: 1
BH CV STRESS STAGE 2: 2
BH CV STRESS STAGE 3: 3
MAXIMAL PREDICTED HEART RATE: 149 BPM
PERCENT MAX PREDICTED HR: 97.99 %
STRESS BASELINE BP: NORMAL MMHG
STRESS BASELINE HR: 69 BPM
STRESS PERCENT HR: 115 %
STRESS POST ESTIMATED WORKLOAD: 8.6 METS
STRESS POST EXERCISE DUR MIN: 7 MIN
STRESS POST EXERCISE DUR SEC: 1 SEC
STRESS POST PEAK BP: NORMAL MMHG
STRESS POST PEAK HR: 146 BPM
STRESS TARGET HR: 127 BPM

## 2023-05-03 PROCEDURE — 93017 CV STRESS TEST TRACING ONLY: CPT

## 2023-06-02 RX ORDER — ATORVASTATIN CALCIUM 20 MG/1
TABLET, FILM COATED ORAL
Qty: 90 TABLET | Refills: 1 | Status: SHIPPED | OUTPATIENT
Start: 2023-06-02

## 2023-06-05 ENCOUNTER — HOSPITAL ENCOUNTER (OUTPATIENT)
Dept: SLEEP MEDICINE | Facility: HOSPITAL | Age: 73
Discharge: HOME | End: 2023-06-05
Admitting: INTERNAL MEDICINE
Payer: MEDICARE

## 2023-06-05 DIAGNOSIS — G47.33 OSA (OBSTRUCTIVE SLEEP APNEA): ICD-10-CM

## 2023-06-05 PROCEDURE — 95806 SLEEP STUDY UNATT&RESP EFFT: CPT

## 2023-06-06 ENCOUNTER — HOSPITAL ENCOUNTER (OUTPATIENT)
Dept: SLEEP MEDICINE | Facility: HOSPITAL | Age: 73
Discharge: HOME | End: 2023-06-06
Admitting: INTERNAL MEDICINE
Payer: MEDICARE

## 2023-06-06 DIAGNOSIS — G47.33 OSA (OBSTRUCTIVE SLEEP APNEA): ICD-10-CM

## 2023-06-06 PROCEDURE — 95806 SLEEP STUDY UNATT&RESP EFFT: CPT

## 2023-06-06 PROCEDURE — 95806 SLEEP STUDY UNATT&RESP EFFT: CPT | Performed by: INTERNAL MEDICINE

## 2023-06-07 ENCOUNTER — HOSPITAL ENCOUNTER (OUTPATIENT)
Dept: SLEEP MEDICINE | Facility: HOSPITAL | Age: 73
Discharge: HOME | End: 2023-06-07
Admitting: INTERNAL MEDICINE
Payer: MEDICARE

## 2023-06-07 DIAGNOSIS — G47.33 OSA (OBSTRUCTIVE SLEEP APNEA): ICD-10-CM

## 2023-06-07 PROCEDURE — 95806 SLEEP STUDY UNATT&RESP EFFT: CPT

## 2023-06-15 ENCOUNTER — OFFICE VISIT (OUTPATIENT)
Dept: INTERNAL MEDICINE | Facility: CLINIC | Age: 73
End: 2023-06-15
Payer: MEDICARE

## 2023-06-15 VITALS
OXYGEN SATURATION: 95 % | HEART RATE: 85 BPM | TEMPERATURE: 97.5 F | WEIGHT: 187 LBS | SYSTOLIC BLOOD PRESSURE: 134 MMHG | HEIGHT: 70 IN | DIASTOLIC BLOOD PRESSURE: 84 MMHG | BODY MASS INDEX: 26.77 KG/M2

## 2023-06-15 DIAGNOSIS — M48.061 SPINAL STENOSIS OF LUMBAR REGION WITHOUT NEUROGENIC CLAUDICATION: Chronic | ICD-10-CM

## 2023-06-15 DIAGNOSIS — E78.00 PURE HYPERCHOLESTEROLEMIA: Chronic | ICD-10-CM

## 2023-06-15 DIAGNOSIS — Z00.00 MEDICARE ANNUAL WELLNESS VISIT, SUBSEQUENT: Primary | ICD-10-CM

## 2023-06-15 DIAGNOSIS — C44.41 BASAL CELL CARCINOMA (BCC) OF SCALP: ICD-10-CM

## 2023-06-15 DIAGNOSIS — D64.9 ANEMIA, UNSPECIFIED TYPE: ICD-10-CM

## 2023-06-15 DIAGNOSIS — E03.9 ACQUIRED HYPOTHYROIDISM: Chronic | ICD-10-CM

## 2023-06-15 DIAGNOSIS — I10 BENIGN ESSENTIAL HTN: Chronic | ICD-10-CM

## 2023-06-15 DIAGNOSIS — R73.09 ELEVATED GLUCOSE: ICD-10-CM

## 2023-06-15 LAB
ALBUMIN SERPL-MCNC: 4.5 G/DL (ref 3.5–5.2)
ALBUMIN/GLOB SERPL: 1.7 G/DL
ALP SERPL-CCNC: 80 U/L (ref 39–117)
ALT SERPL-CCNC: 18 U/L (ref 1–41)
AST SERPL-CCNC: 16 U/L (ref 1–40)
BILIRUB SERPL-MCNC: 0.5 MG/DL (ref 0–1.2)
BUN SERPL-MCNC: 17 MG/DL (ref 8–23)
BUN/CREAT SERPL: 14.4 (ref 7–25)
CALCIUM SERPL-MCNC: 10.5 MG/DL (ref 8.6–10.5)
CHLORIDE SERPL-SCNC: 105 MMOL/L (ref 98–107)
CHOLEST SERPL-MCNC: 130 MG/DL (ref 0–200)
CO2 SERPL-SCNC: 28.4 MMOL/L (ref 22–29)
CREAT SERPL-MCNC: 1.18 MG/DL (ref 0.76–1.27)
EGFRCR SERPLBLD CKD-EPI 2021: 66 ML/MIN/1.73
ERYTHROCYTE [DISTWIDTH] IN BLOOD BY AUTOMATED COUNT: 16.5 % (ref 12.3–15.4)
GLOBULIN SER CALC-MCNC: 2.7 GM/DL
GLUCOSE SERPL-MCNC: 96 MG/DL (ref 65–99)
HBA1C MFR BLD: 6 % (ref 4.8–5.6)
HCT VFR BLD AUTO: 39.7 % (ref 37.5–51)
HDLC SERPL-MCNC: 49 MG/DL (ref 40–60)
HGB BLD-MCNC: 12.1 G/DL (ref 13–17.7)
LDLC SERPL CALC-MCNC: 64 MG/DL (ref 0–100)
MCH RBC QN AUTO: 24.8 PG (ref 26.6–33)
MCHC RBC AUTO-ENTMCNC: 30.5 G/DL (ref 31.5–35.7)
MCV RBC AUTO: 81.5 FL (ref 79–97)
PLATELET # BLD AUTO: 629 10*3/MM3 (ref 140–450)
POTASSIUM SERPL-SCNC: 5.3 MMOL/L (ref 3.5–5.2)
PROT SERPL-MCNC: 7.2 G/DL (ref 6–8.5)
RBC # BLD AUTO: 4.87 10*6/MM3 (ref 4.14–5.8)
SODIUM SERPL-SCNC: 141 MMOL/L (ref 136–145)
TRIGL SERPL-MCNC: 88 MG/DL (ref 0–150)
TSH SERPL DL<=0.005 MIU/L-ACNC: 2.55 UIU/ML (ref 0.27–4.2)
VLDLC SERPL CALC-MCNC: 17 MG/DL (ref 5–40)
WBC # BLD AUTO: 3.91 10*3/MM3 (ref 3.4–10.8)

## 2023-06-15 PROCEDURE — 95806 SLEEP STUDY UNATT&RESP EFFT: CPT | Performed by: INTERNAL MEDICINE

## 2023-06-15 NOTE — PROGRESS NOTES
The ABCs of the Annual Wellness Visit  Subsequent Medicare Wellness Visit    Subjective    Anoop Montenegro is a 72 y.o. male who presents for a Subsequent Medicare Wellness Visit.    The following portions of the patient's history were reviewed and   updated as appropriate: allergies, current medications, past family history, past medical history, past social history, past surgical history, and problem list.    Compared to one year ago, the patient feels his physical   health is the same.    Compared to one year ago, the patient feels his mental   health is the same.    Recent Hospitalizations:  He was not admitted to the hospital during the last year.       Current Medical Providers:  Patient Care Team:  Cathy Muhammad APRN as PCP - General (Internal Medicine)  Elise Johnson MD as Consulting Physician (Hematology and Oncology)    Outpatient Medications Prior to Visit   Medication Sig Dispense Refill    aspirin 81 MG EC tablet Take 1 tablet by mouth Daily. 30 tablet 0    atorvastatin (LIPITOR) 20 MG tablet TAKE 1 TABLET DAILY 90 tablet 1    irbesartan (AVAPRO) 300 MG tablet TAKE 1 TABLET DAILY 90 tablet 1    levothyroxine (SYNTHROID, LEVOTHROID) 112 MCG tablet TAKE 1 TABLET DAILY (DOSE  CHANGED FROM 125MCG TO     112MCG, PLEASE DISCONTINUE 125MCG) 90 tablet 3    meloxicam (MOBIC) 15 MG tablet Take 1 tablet by mouth Daily As Needed for Moderate Pain . Take with food 90 tablet 1    sildenafil (VIAGRA) 100 MG tablet Take 1 tablet by mouth Daily As Needed for Erectile Dysfunction. 10 tablet 3    azithromycin (Zithromax Z-Osiel) 250 MG tablet Take 2 tablets at the same time Day 1 and then 1 tablet every 24 hour thereafter. 6 tablet 0    zolpidem (AMBIEN) 10 MG tablet Take 1 tablet by mouth At Night As Needed for Sleep. (Patient not taking: Reported on 6/15/2023) 30 tablet 1     No facility-administered medications prior to visit.       No opioid medication identified on active medication list. I have reviewed chart  "for other potential  high risk medication/s and harmful drug interactions in the elderly.        Aspirin is on active medication list. Aspirin use is indicated based on review of current medical condition/s. Pros and cons of this therapy have been discussed today. Benefits of this medication outweigh potential harm.  Patient has been encouraged to continue taking this medication.  .      Patient Active Problem List   Diagnosis    Cervical spinal stenosis    DDD (degenerative disc disease), cervical    Benign essential HTN    Raynaud's phenomenon without gangrene    Chronic bilateral low back pain without sciatica    Hypothyroidism    Insomnia    Hyperlipidemia    Erectile dysfunction    Snoring    Spinal stenosis of lumbar region without neurogenic claudication    Hypersomnia, unspecified    HANNAH (obstructive sleep apnea)    Epidermoid cyst of skin of scrotum    Impingement syndrome of right shoulder    Impingement syndrome of left shoulder     Advance Care Planning   Advance Care Planning     Advance Directive is not on file.  ACP discussion was held with the patient during this visit. Patient has an advance directive (not in EMR), copy requested.     Objective    Vitals:    06/15/23 0915 06/15/23 1006   BP: 150/94 134/84   BP Location: Left arm Left arm   Patient Position: Sitting Sitting   Cuff Size: Adult Adult   Pulse: 85    Temp: 97.5 °F (36.4 °C)    TempSrc: Temporal    SpO2: 95%    Weight: 84.8 kg (187 lb)    Height: 177.8 cm (70\")    PainSc: 0-No pain      Estimated body mass index is 26.83 kg/m² as calculated from the following:    Height as of this encounter: 177.8 cm (70\").    Weight as of this encounter: 84.8 kg (187 lb).    BMI is >= 25 and <30. (Overweight) The following options were offered after discussion;: nutrition counseling/recommendations      Does the patient have evidence of cognitive impairment? No    Lab Results   Component Value Date    CHLPL 130 06/15/2023    TRIG 88 06/15/2023    HDL 49 " 06/15/2023    LDL 64 06/15/2023    VLDL 17 06/15/2023    HGBA1C 6.00 (H) 06/15/2023        HEALTH RISK ASSESSMENT    Smoking Status:  Social History     Tobacco Use   Smoking Status Never   Smokeless Tobacco Never     Alcohol Consumption:  Social History     Substance and Sexual Activity   Alcohol Use Yes    Alcohol/week: 2.0 standard drinks    Types: 1 Glasses of wine, 1 Cans of beer per week    Comment: 3-5 times a week     Fall Risk Screen:    DAT Fall Risk Assessment was completed, and patient is at LOW risk for falls.Assessment completed on:6/15/2023    Depression Screenin/15/2023     9:20 AM   PHQ-2/PHQ-9 Depression Screening   Little Interest or Pleasure in Doing Things 0-->not at all   Feeling Down, Depressed or Hopeless 0-->not at all   PHQ-9: Brief Depression Severity Measure Score 0       Health Habits and Functional and Cognitive Screenin/15/2023     9:20 AM   Functional & Cognitive Status   Do you have difficulty preparing food and eating? No   Do you have difficulty bathing yourself, getting dressed or grooming yourself? No   Do you have difficulty using the toilet? No   Do you have difficulty moving around from place to place? No   Do you have trouble with steps or getting out of a bed or a chair? No   Current Diet Well Balanced Diet   Dental Exam Up to date   Eye Exam Up to date   Exercise (times per week) 2 times per week   Current Exercises Include Walking   Do you need help using the phone?  No   Are you deaf or do you have serious difficulty hearing?  Yes   Do you need help with transportation? No   Do you need help shopping? No   Do you need help preparing meals?  No   Do you need help with housework?  No   Do you need help with laundry? No   Do you need help taking your medications? No   Do you need help managing money? No   Do you ever drive or ride in a car without wearing a seat belt? No   Have you felt unusual stress, anger or loneliness in the last month? No   Who do  you live with? Spouse   If you need help, do you have trouble finding someone available to you? No   Have you been bothered in the last four weeks by sexual problems? No   Do you have difficulty concentrating, remembering or making decisions? No       Age-appropriate Screening Schedule:  Refer to the list below for future screening recommendations based on patient's age, sex and/or medical conditions. Orders for these recommended tests are listed in the plan section. The patient has been provided with a written plan.    Health Maintenance   Topic Date Due    COVID-19 Vaccine (7 - Pfizer series) 11/27/2022    ANNUAL WELLNESS VISIT  06/06/2023    INFLUENZA VACCINE  10/01/2023    LIPID PANEL  06/15/2024    COLORECTAL CANCER SCREENING  03/14/2025    TDAP/TD VACCINES (5 - Td or Tdap) 05/13/2031    HEPATITIS C SCREENING  Completed    Pneumococcal Vaccine 65+  Completed    ZOSTER VACCINE  Completed                  CMS Preventative Services Quick Reference  Risk Factors Identified During Encounter  Hearing Problem:  wearing hearing aides  Immunizations Discussed/Encouraged: COVID19  The above risks/problems have been discussed with the patient.  Pertinent information has been shared with the patient in the After Visit Summary.  An After Visit Summary and PPPS were made available to the patient.    Follow Up:   Next Medicare Wellness visit to be scheduled in 1 year.       Additional E&M Note during same encounter follows:  Patient has multiple medical problems which are significant and separately identifiable that require additional work above and beyond the Medicare Wellness Visit.      Chief Complaint  Medicare Wellness-subsequent, Hypertension, Hypothyroidism, and Back Pain    Subjective        HPI  Anoop Montenegro is also being seen today for f/u regarding HTN, hypothyroidism and chronic low back pain.    He is scheduled for Mohs' surgery 6/21/23 for basal cell cancer on his scalp.    He was recently treated for an URI  "with a Zpak, c/o a diffuse erythematous rash which has since resolved.    He had a stress test 5/2023 due to dyspnea on exertion which has improved, tolerating increased activity without difficulty. He gets on the elliptical regularly with HR in the 130's. Denies chest pain or palpitations.    He is working with Dr. Vidal and Dr. Giang for mgmt of sleep apnea and adjustment or oral device.     He had a hearing test in January with no acute changes.    Review of Systems   Constitutional:  Negative for fatigue and fever.   HENT:  Positive for congestion, hearing loss and postnasal drip.    Respiratory:  Negative for cough, chest tightness and shortness of breath.    Cardiovascular:  Negative for chest pain, palpitations and leg swelling.   Gastrointestinal:  Negative for abdominal pain.   Musculoskeletal:  Positive for arthralgias and back pain.     Objective   Vital Signs:  /84 (BP Location: Left arm, Patient Position: Sitting, Cuff Size: Adult)   Pulse 85   Temp 97.5 °F (36.4 °C) (Temporal)   Ht 177.8 cm (70\")   Wt 84.8 kg (187 lb)   SpO2 95%   BMI 26.83 kg/m²     Physical Exam  Constitutional:       Appearance: He is well-developed. He is not ill-appearing.   HENT:      Head: Normocephalic.      Right Ear: Hearing, tympanic membrane and external ear normal.      Left Ear: Hearing, tympanic membrane and external ear normal.      Nose: Nose normal. No nasal deformity, mucosal edema or rhinorrhea.      Right Sinus: No maxillary sinus tenderness or frontal sinus tenderness.      Left Sinus: No maxillary sinus tenderness or frontal sinus tenderness.      Mouth/Throat:      Dentition: Normal dentition.   Eyes:      General: Lids are normal.         Right eye: No discharge.         Left eye: No discharge.      Conjunctiva/sclera: Conjunctivae normal.      Right eye: No exudate.     Left eye: No exudate.  Neck:      Thyroid: No thyroid mass or thyromegaly.      Vascular: No carotid bruit.      Trachea: " Trachea normal.   Cardiovascular:      Rate and Rhythm: Regular rhythm.      Pulses: Normal pulses.      Heart sounds: Normal heart sounds. No murmur heard.  Pulmonary:      Effort: No respiratory distress.      Breath sounds: Normal breath sounds. No decreased breath sounds, wheezing, rhonchi or rales.   Abdominal:      General: Bowel sounds are normal.      Palpations: Abdomen is soft.      Tenderness: There is no abdominal tenderness.   Musculoskeletal:      Cervical back: Normal range of motion. No edema.   Lymphadenopathy:      Head:      Right side of head: No submental, submandibular, tonsillar, preauricular, posterior auricular or occipital adenopathy.      Left side of head: No submental, submandibular, tonsillar, preauricular, posterior auricular or occipital adenopathy.   Skin:     General: Skin is warm and dry.      Nails: There is no clubbing.   Neurological:      Mental Status: He is alert.   Psychiatric:         Behavior: Behavior is cooperative.        The following data was reviewed by: DONNIE Narayan on 06/15/2023:  Common labs          12/12/2022    09:29 6/15/2023    10:19   Common Labs   Glucose 96  96    BUN 17  17    Creatinine 1.25  1.18    Sodium 139  141    Potassium 4.7  5.3    Chloride 101  105    Calcium 10.3  10.5    Total Protein 6.9  7.2    Albumin 4.80  4.5    Total Bilirubin 0.4  0.5    Alkaline Phosphatase 83  80    AST (SGOT) 18  16    ALT (SGPT) 16  18    WBC 4.64  3.91    Hemoglobin 12.8  12.1    Hematocrit 40.9  39.7    Platelets 423  629    Total Cholesterol 144  130    Triglycerides 101  88    HDL Cholesterol 50  49    LDL Cholesterol  75  64    Hemoglobin A1C 5.90  6.00    PSA 2.650                  Assessment and Plan   Diagnoses and all orders for this visit:    1. Medicare annual wellness visit, subsequent (Primary)    2. Benign essential HTN  Assessment & Plan:  BP was elevated but improved upon repeat, continue irbesartan along with home  monitoring.    Orders:  -     CBC (No Diff)  -     Comprehensive Metabolic Panel    3. Pure hypercholesterolemia  Assessment & Plan:  He denies myalgias with atorvastatin which he will continue along with a low-fat, low-cholesterol diet.   Lab Results   Component Value Date    LDL 64 06/15/2023       Orders:  -     Lipid Panel    4. Acquired hypothyroidism  Assessment & Plan:  He is on Synthroid 112 mcg daily for replacement.   Lab Results   Component Value Date    TSH 2.550 06/15/2023       Orders:  -     TSH    5. Spinal stenosis of lumbar region without neurogenic claudication  Assessment & Plan:  He c/o low back pain and stiffness, sx managed on Meloxicam as needed.      6. Elevated glucose  Comments:  Check A1c to further evaluate  Orders:  -     Cancel: Hemoglobin A1c    7. Anemia, unspecified type  Comments:  HgB & Hct mildly decreased, check anemia panel  Orders:  -     Ferritin  -     Iron Profile  -     Vitamin B12 & Folate    Other orders  -     Hemoglobin A1c  -     Iron Profile  -     Vitamin B12 & Folate  -     Ferritin  -     Written Authorization             Follow Up   Return in about 6 months (around 12/15/2023).  Patient was given instructions and counseling regarding his condition or for health maintenance advice. Please see specific information pulled into the AVS if appropriate.

## 2023-06-16 ENCOUNTER — TELEPHONE (OUTPATIENT)
Dept: SLEEP MEDICINE | Facility: HOSPITAL | Age: 73
End: 2023-06-16
Payer: MEDICARE

## 2023-06-16 LAB
FERRITIN SERPL-MCNC: 9.6 NG/ML (ref 30–400)
FOLATE SERPL-MCNC: 15.6 NG/ML (ref 4.78–24.2)
IRON SATN MFR SERPL: 8 % (ref 20–50)
IRON SERPL-MCNC: 35 MCG/DL (ref 59–158)
TIBC SERPL-MCNC: 446 MCG/DL
UIBC SERPL-MCNC: 411 MCG/DL (ref 112–346)
VIT B12 SERPL-MCNC: 689 PG/ML (ref 211–946)
WRITTEN AUTHORIZATION: NORMAL

## 2023-06-17 PROBLEM — C44.41 BASAL CELL CARCINOMA (BCC) OF SCALP: Status: ACTIVE | Noted: 2023-06-17

## 2023-06-17 PROBLEM — C44.41 BASAL CELL CARCINOMA (BCC) OF SCALP: Chronic | Status: ACTIVE | Noted: 2023-06-17

## 2023-06-17 NOTE — ASSESSMENT & PLAN NOTE
He is on Synthroid 112 mcg daily for replacement.   Lab Results   Component Value Date    TSH 2.550 06/15/2023

## 2023-06-17 NOTE — ASSESSMENT & PLAN NOTE
He denies myalgias with atorvastatin which he will continue along with a low-fat, low-cholesterol diet.   Lab Results   Component Value Date    LDL 64 06/15/2023

## 2023-06-17 NOTE — PATIENT INSTRUCTIONS
Medicare Wellness  Personal Prevention Plan of Service     Date of Office Visit:    Encounter Provider:  DONNIE Narayan  Place of Service:  [facility]  Patient Name: Anoop Montenegro  :  1950    As part of the Medicare Wellness portion of your visit today, we are providing you with this personalized preventive plan of services (PPPS). This plan is based upon recommendations of the [place] Preventive Services Task Force (USPSTF) and the Advisory Committee on Immunization Practices (ACIP).    This lists the preventive care services that should be considered, and provides dates of when you are due. Items listed as completed are up-to-date and do not require any further intervention.    Health Maintenance   Topic Date Due    COVID-19 Vaccine (7 - Pfizer series) 2022    ANNUAL WELLNESS VISIT  2023    INFLUENZA VACCINE  10/01/2023    LIPID PANEL  06/15/2024    COLORECTAL CANCER SCREENING  2025    TDAP/TD VACCINES (5 - Td or Tdap) 2031    HEPATITIS C SCREENING  Completed    Pneumococcal Vaccine 65+  Completed    ZOSTER VACCINE  Completed       Orders Placed This Encounter   Procedures    CBC (No Diff)     Order Specific Question:   Release to patient     Answer:   Routine Release     Order Specific Question:   [facility] Has the patient fasted?     Answer:   Yes    Comprehensive Metabolic Panel     Order Specific Question:   Release to patient     Answer:   Routine Release     Order Specific Question:   [facility] Has the patient fasted?     Answer:   Yes    Lipid Panel     Order Specific Question:   [facility] Has the patient fasted?     Answer:   Yes    TSH     Order Specific Question:   Release to patient     Answer:   Routine Release     Order Specific Question:   [facility] Has the patient fasted?     Answer:   Yes    Hemoglobin A1c     Order Specific Question:   [facility] Has the patient fasted?     Answer:   Yes    Ferritin    Iron Profile    Vitamin B12  & Folate     Order Specific Question:   Release to patient     Answer:   Routine Release    Iron Profile     Order Specific Question:   [facility] Has the patient fasted?     Answer:   Yes    Vitamin B12 & Folate     Order Specific Question:   [facility] Has the patient fasted?     Answer:   Yes    Ferritin     Order Specific Question:   [facility] Has the patient fasted?     Answer:   Yes    Written Authorization     Order Specific Question:   [facility] Has the patient fasted?     Answer:   Yes       Return in about 6 months (around 12/15/2023).

## 2023-06-18 DIAGNOSIS — D50.9 IRON DEFICIENCY ANEMIA, UNSPECIFIED IRON DEFICIENCY ANEMIA TYPE: Primary | ICD-10-CM

## 2023-08-21 ENCOUNTER — OFFICE VISIT (OUTPATIENT)
Dept: INTERNAL MEDICINE | Facility: CLINIC | Age: 73
End: 2023-08-21
Payer: MEDICARE

## 2023-08-21 VITALS
HEART RATE: 60 BPM | OXYGEN SATURATION: 100 % | WEIGHT: 184 LBS | DIASTOLIC BLOOD PRESSURE: 82 MMHG | BODY MASS INDEX: 26.34 KG/M2 | HEIGHT: 70 IN | SYSTOLIC BLOOD PRESSURE: 134 MMHG | TEMPERATURE: 98.4 F

## 2023-08-21 DIAGNOSIS — I10 BENIGN ESSENTIAL HTN: Chronic | ICD-10-CM

## 2023-08-21 DIAGNOSIS — D50.9 IRON DEFICIENCY ANEMIA, UNSPECIFIED IRON DEFICIENCY ANEMIA TYPE: Primary | ICD-10-CM

## 2023-08-21 PROCEDURE — 1159F MED LIST DOCD IN RCRD: CPT | Performed by: NURSE PRACTITIONER

## 2023-08-21 PROCEDURE — 3079F DIAST BP 80-89 MM HG: CPT | Performed by: NURSE PRACTITIONER

## 2023-08-21 PROCEDURE — 1160F RVW MEDS BY RX/DR IN RCRD: CPT | Performed by: NURSE PRACTITIONER

## 2023-08-21 PROCEDURE — 3075F SYST BP GE 130 - 139MM HG: CPT | Performed by: NURSE PRACTITIONER

## 2023-08-21 PROCEDURE — 99213 OFFICE O/P EST LOW 20 MIN: CPT | Performed by: NURSE PRACTITIONER

## 2023-08-22 LAB
BASOPHILS # BLD AUTO: 0.1 X10E3/UL (ref 0–0.2)
BASOPHILS NFR BLD AUTO: 1 %
EOSINOPHIL # BLD AUTO: 0.4 X10E3/UL (ref 0–0.4)
EOSINOPHIL NFR BLD AUTO: 11 %
ERYTHROCYTE [DISTWIDTH] IN BLOOD BY AUTOMATED COUNT: 17.7 % (ref 11.6–15.4)
FERRITIN SERPL-MCNC: 9 NG/ML (ref 30–400)
HCT VFR BLD AUTO: 40.4 % (ref 37.5–51)
HGB BLD-MCNC: 12.1 G/DL (ref 13–17.7)
IMM GRANULOCYTES # BLD AUTO: 0 X10E3/UL (ref 0–0.1)
IMM GRANULOCYTES NFR BLD AUTO: 0 %
IRON SATN MFR SERPL: 11 % (ref 15–55)
IRON SERPL-MCNC: 41 UG/DL (ref 38–169)
LYMPHOCYTES # BLD AUTO: 1.6 X10E3/UL (ref 0.7–3.1)
LYMPHOCYTES NFR BLD AUTO: 37 %
MCH RBC QN AUTO: 24.1 PG (ref 26.6–33)
MCHC RBC AUTO-ENTMCNC: 30 G/DL (ref 31.5–35.7)
MCV RBC AUTO: 81 FL (ref 79–97)
MONOCYTES # BLD AUTO: 0.4 X10E3/UL (ref 0.1–0.9)
MONOCYTES NFR BLD AUTO: 9 %
NEUTROPHILS # BLD AUTO: 1.8 X10E3/UL (ref 1.4–7)
NEUTROPHILS NFR BLD AUTO: 42 %
PLATELET # BLD AUTO: 403 X10E3/UL (ref 150–450)
RBC # BLD AUTO: 5.02 X10E6/UL (ref 4.14–5.8)
TIBC SERPL-MCNC: 365 UG/DL (ref 250–450)
UIBC SERPL-MCNC: 324 UG/DL (ref 111–343)
WBC # BLD AUTO: 4.2 X10E3/UL (ref 3.4–10.8)

## 2023-09-02 DIAGNOSIS — G47.33 OSA (OBSTRUCTIVE SLEEP APNEA): Primary | ICD-10-CM

## 2023-09-03 NOTE — PROGRESS NOTES
"Chief Complaint  Anemia    Subjective        Anoop Montenegro presents to [facility] for f/u regarding anemia and HTN.      History of Present Illness  His iron levels were low with his 6/15 labs, does donate blood regularly which he has done for several years. Denies rectal bleeding, colonoscopy performed 3/2023. Hemoccult negative for blood. Denies dyspepsia and/or heavy NSAID use.  He has tried to increase the dietary intake of iron and has delayed donating blood for now. He reports feeling well with a good energy level.        Hypertension  This is a chronic problem. The current episode started more than 1 year ago. The problem has been gradually improving since onset. The problem is controlled. Pertinent negatives include no anxiety, blurred vision, chest pain, headaches, malaise/fatigue, neck pain, orthopnea, palpitations, peripheral edema, PND, shortness of breath or sweats. Agents associated with hypertension include NSAIDs and thyroid hormones. Risk factors for coronary artery disease include family history. There are no compliance problems.      Objective   Vital Signs:  /82 (BP Location: Left arm, Patient Position: Sitting, Cuff Size: Adult)   Pulse 60   Temp 98.4 øF (36.9 øC) (Temporal)   Ht 177.8 cm (70\")   Wt 83.5 kg (184 lb)   SpO2 100%   BMI 26.40 kg/mý   Estimated body mass index is 26.4 kg/mý as calculated from the following:    Height as of this encounter: 177.8 cm (70\").    Weight as of this encounter: 83.5 kg (184 lb).               Physical Exam  Constitutional:       Appearance: He is well-developed. He is not ill-appearing.   HENT:      Head: Normocephalic.      Ears:      Comments: Bilateral hearing aides     Nose: Nose normal. No nasal deformity, mucosal edema or rhinorrhea.      Right Sinus: No maxillary sinus tenderness or frontal sinus tenderness.      Left Sinus: No maxillary sinus tenderness or frontal sinus tenderness.      Mouth/Throat:      " Dentition: Normal dentition.   Eyes:      General: Lids are normal.         Right eye: No discharge.         Left eye: No discharge.      Conjunctiva/sclera: Conjunctivae normal.      Right eye: No exudate.     Left eye: No exudate.  Neck:      Thyroid: No thyroid mass or thyromegaly.      Vascular: No carotid bruit.      Trachea: Trachea normal.   Cardiovascular:      Rate and Rhythm: Regular rhythm.      Pulses: Normal pulses.      Heart sounds: Normal heart sounds. No murmur heard.  Pulmonary:      Effort: No respiratory distress.      Breath sounds: Normal breath sounds. No decreased breath sounds, wheezing, rhonchi or rales.   Abdominal:      General: Bowel sounds are normal.      Palpations: Abdomen is soft.      Tenderness: There is no abdominal tenderness.   Musculoskeletal:      Cervical back: Normal range of motion. No edema.   Lymphadenopathy:      Head:      Right side of head: No submental, submandibular, tonsillar, preauricular, posterior auricular or occipital adenopathy.      Left side of head: No submental, submandibular, tonsillar, preauricular, posterior auricular or occipital adenopathy.   Skin:     General: Skin is warm and dry.      Nails: There is no clubbing.   Neurological:      Mental Status: He is alert.   Psychiatric:         Behavior: Behavior is cooperative.      Result Review :  The following data was reviewed by: DONNIE Narayan on 08/21/2023:  Common labs          12/12/2022    09:29 6/15/2023    10:19 8/21/2023    10:55   Common Labs   Glucose 96  96     BUN 17  17     Creatinine 1.25  1.18     Sodium 139  141     Potassium 4.7  5.3     Chloride 101  105     Calcium 10.3  10.5     Total Protein 6.9  7.2     Albumin 4.80  4.5     Total Bilirubin 0.4  0.5     Alkaline Phosphatase 83  80     AST (SGOT) 18  16     ALT (SGPT) 16  18     WBC 4.64  3.91  4.2    Hemoglobin 12.8  12.1  12.1    Hematocrit 40.9  39.7  40.4    Platelets 423  629  403    Total Cholesterol 144  130      Triglycerides 101  88     HDL Cholesterol 50  49     LDL Cholesterol  75  64     Hemoglobin A1C 5.90  6.00     PSA 2.650                     Assessment and Plan   Diagnoses and all orders for this visit:    1. Iron deficiency anemia, unspecified iron deficiency anemia type (Primary)  -     CBC & Differential  -     Ferritin  -     Iron Profile    2. Benign essential HTN  Assessment & Plan:  BP is well-controlled on irbesartan which he will continue along with a low sodium diet.      Discussed previous labs, he has increased his dietary intake of iron-recheck levels today. If levels remain low he will begin an iron supplement, continue to hold blood donation for now.         Follow Up   Return if symptoms worsen or fail to improve, for Next scheduled follow up.  Patient was given instructions and counseling regarding his condition or for health maintenance advice. Please see specific information pulled into the AVS if appropriate.       Answers submitted by the patient for this visit:  Primary Reason for Visit (Submitted on 8/15/2023)  What is the primary reason for your visit?: High Blood Pressure

## 2023-09-11 ENCOUNTER — HOSPITAL ENCOUNTER (OUTPATIENT)
Dept: SLEEP MEDICINE | Facility: HOSPITAL | Age: 73
End: 2023-09-11
Payer: MEDICARE

## 2023-09-11 DIAGNOSIS — G47.33 OSA (OBSTRUCTIVE SLEEP APNEA): ICD-10-CM

## 2023-09-11 PROCEDURE — 95806 SLEEP STUDY UNATT&RESP EFFT: CPT

## 2023-09-12 ENCOUNTER — HOSPITAL ENCOUNTER (OUTPATIENT)
Dept: SLEEP MEDICINE | Facility: HOSPITAL | Age: 73
End: 2023-09-12
Payer: MEDICARE

## 2023-09-12 DIAGNOSIS — G47.33 OSA (OBSTRUCTIVE SLEEP APNEA): ICD-10-CM

## 2023-09-12 PROCEDURE — 95806 SLEEP STUDY UNATT&RESP EFFT: CPT

## 2023-09-13 ENCOUNTER — HOSPITAL ENCOUNTER (OUTPATIENT)
Dept: SLEEP MEDICINE | Facility: HOSPITAL | Age: 73
End: 2023-09-13
Payer: MEDICARE

## 2023-09-13 DIAGNOSIS — G47.33 OSA (OBSTRUCTIVE SLEEP APNEA): ICD-10-CM

## 2023-09-13 PROCEDURE — 95806 SLEEP STUDY UNATT&RESP EFFT: CPT

## 2023-10-06 ENCOUNTER — TELEPHONE (OUTPATIENT)
Dept: SLEEP MEDICINE | Facility: HOSPITAL | Age: 73
End: 2023-10-06
Payer: MEDICARE

## 2023-10-08 DIAGNOSIS — I10 BENIGN ESSENTIAL HTN: ICD-10-CM

## 2023-10-10 RX ORDER — IRBESARTAN 300 MG/1
TABLET ORAL
Qty: 90 TABLET | Refills: 1 | Status: SHIPPED | OUTPATIENT
Start: 2023-10-10

## 2023-12-14 RX ORDER — ATORVASTATIN CALCIUM 20 MG/1
TABLET, FILM COATED ORAL
Qty: 90 TABLET | Refills: 1 | Status: SHIPPED | OUTPATIENT
Start: 2023-12-14

## 2023-12-18 ENCOUNTER — OFFICE VISIT (OUTPATIENT)
Dept: INTERNAL MEDICINE | Facility: CLINIC | Age: 73
End: 2023-12-18
Payer: MEDICARE

## 2023-12-18 VITALS
WEIGHT: 190.8 LBS | SYSTOLIC BLOOD PRESSURE: 130 MMHG | DIASTOLIC BLOOD PRESSURE: 84 MMHG | OXYGEN SATURATION: 100 % | BODY MASS INDEX: 27.32 KG/M2 | HEART RATE: 54 BPM | HEIGHT: 70 IN

## 2023-12-18 DIAGNOSIS — I73.00 RAYNAUD'S PHENOMENON WITHOUT GANGRENE: Chronic | ICD-10-CM

## 2023-12-18 DIAGNOSIS — M48.061 SPINAL STENOSIS OF LUMBAR REGION WITHOUT NEUROGENIC CLAUDICATION: Chronic | ICD-10-CM

## 2023-12-18 DIAGNOSIS — I10 BENIGN ESSENTIAL HTN: Primary | Chronic | ICD-10-CM

## 2023-12-18 DIAGNOSIS — E03.9 ACQUIRED HYPOTHYROIDISM: Chronic | ICD-10-CM

## 2023-12-18 DIAGNOSIS — Z12.5 SCREENING FOR PROSTATE CANCER: ICD-10-CM

## 2023-12-18 DIAGNOSIS — E78.00 PURE HYPERCHOLESTEROLEMIA: Chronic | ICD-10-CM

## 2023-12-18 PROCEDURE — 1159F MED LIST DOCD IN RCRD: CPT | Performed by: NURSE PRACTITIONER

## 2023-12-18 PROCEDURE — 3075F SYST BP GE 130 - 139MM HG: CPT | Performed by: NURSE PRACTITIONER

## 2023-12-18 PROCEDURE — 1160F RVW MEDS BY RX/DR IN RCRD: CPT | Performed by: NURSE PRACTITIONER

## 2023-12-18 PROCEDURE — 3079F DIAST BP 80-89 MM HG: CPT | Performed by: NURSE PRACTITIONER

## 2023-12-18 PROCEDURE — 99214 OFFICE O/P EST MOD 30 MIN: CPT | Performed by: NURSE PRACTITIONER

## 2023-12-18 NOTE — PROGRESS NOTES
"Chief Complaint  Hypertension (6 month f/u), Hypothyroidism, and Hyperlipidemia    Subjective        Anoop Montenegro presents to [facility] for f/u regarding HTN, hypothyroidism and hypercholesterolemia.    History of Present Illness    He reports feeling well, does report difficulty falling and staying asleep.  He reports waking up after 4-5 hours of sleep.  He does take Tylenol PM intermittently which is mildly helpful.    He has taken an iron supplement since his last visit for a low-grade iron deficiency anemia.  He is tolerating medication well.  He denies change in bowel movements and/or rectal bleeding.  Last colonoscopy was in 2022.  Hemoccult studies were negative.      Objective   Vital Signs:  /84 (BP Location: Left arm, Patient Position: Sitting, Cuff Size: Adult)   Pulse 54   Ht 177.8 cm (70\")   Wt 86.5 kg (190 lb 12.8 oz)   SpO2 100%   BMI 27.38 kg/m²   Estimated body mass index is 27.38 kg/m² as calculated from the following:    Height as of this encounter: 177.8 cm (70\").    Weight as of this encounter: 86.5 kg (190 lb 12.8 oz).               Physical Exam  Constitutional:       Appearance: He is well-developed. He is not ill-appearing.   HENT:      Head: Normocephalic.      Right Ear: Hearing, tympanic membrane and external ear normal.      Left Ear: Hearing, tympanic membrane and external ear normal.      Nose: Nose normal. No nasal deformity, mucosal edema or rhinorrhea.      Right Sinus: No maxillary sinus tenderness or frontal sinus tenderness.      Left Sinus: No maxillary sinus tenderness or frontal sinus tenderness.      Mouth/Throat:      Dentition: Normal dentition.   Eyes:      General: Lids are normal.         Right eye: No discharge.         Left eye: No discharge.      Conjunctiva/sclera: Conjunctivae normal.      Right eye: No exudate.     Left eye: No exudate.  Neck:      Thyroid: No thyroid mass or thyromegaly.      Vascular: No carotid bruit. "      Trachea: Trachea normal.   Cardiovascular:      Rate and Rhythm: Regular rhythm.      Pulses: Normal pulses.      Heart sounds: Normal heart sounds. No murmur heard.  Pulmonary:      Effort: No respiratory distress.      Breath sounds: Normal breath sounds. No decreased breath sounds, wheezing, rhonchi or rales.   Abdominal:      General: Bowel sounds are normal.      Palpations: Abdomen is soft.      Tenderness: There is no abdominal tenderness.   Musculoskeletal:      Cervical back: Normal range of motion. No edema.   Lymphadenopathy:      Head:      Right side of head: No submental, submandibular, tonsillar, preauricular, posterior auricular or occipital adenopathy.      Left side of head: No submental, submandibular, tonsillar, preauricular, posterior auricular or occipital adenopathy.   Skin:     General: Skin is warm and dry.      Nails: There is no clubbing.   Neurological:      Mental Status: He is alert.   Psychiatric:         Behavior: Behavior is cooperative.        Result Review :  The following data was reviewed by: DONNIE Narayan on 12/18/2023:  Common labs          6/15/2023    10:19 8/21/2023    10:55   Common Labs   Glucose 96     BUN 17     Creatinine 1.18     Sodium 141     Potassium 5.3     Chloride 105     Calcium 10.5     Total Protein 7.2     Albumin 4.5     Total Bilirubin 0.5     Alkaline Phosphatase 80     AST (SGOT) 16     ALT (SGPT) 18     WBC 3.91  4.2    Hemoglobin 12.1  12.1    Hematocrit 39.7  40.4    Platelets 629  403    Total Cholesterol 130     Triglycerides 88     HDL Cholesterol 49     LDL Cholesterol  64     Hemoglobin A1C 6.00                    Assessment and Plan   Diagnoses and all orders for this visit:    1. Benign essential HTN (Primary)  Assessment & Plan:  Blood pressure was initally elevated in the office which was within normal range upon repeat.  Continue irbesartan along with a low-sodium diet.    Orders:  -     CBC & Differential    2. Pure  hypercholesterolemia  Assessment & Plan:  He denies myalgias with atorvastatin which he will continue along with a low-fat, low-cholesterol diet.    Orders:  -     Comprehensive Metabolic Panel  -     Lipid Panel    3. Raynaud's phenomenon without gangrene  Assessment & Plan:  He does note increased symptoms with colder weather, continue to monitor.      4. Acquired hypothyroidism  Assessment & Plan:  He is currently taking Synthroid 112 mcg for replacement, recheck TSH.    Orders:  -     TSH    5. Spinal stenosis of lumbar region without neurogenic claudication  Assessment & Plan:  He reports infrequent use of meloxicam, has not taken for several weeks.      6. Screening for prostate cancer  -     PSA Screen    Recheck labs today to further evaluate anemia, may need further workup if Hemoglobin & Hematocrit remain low.       Follow Up   Return in about 6 months (around 6/18/2024).  Patient was given instructions and counseling regarding his condition or for health maintenance advice. Please see specific information pulled into the AVS if appropriate.         Answers submitted by the patient for this visit:  Primary Reason for Visit (Submitted on 12/11/2023)  What is the primary reason for your visit?: Other  Other (Submitted on 12/11/2023)  Please describe your symptoms.: Low iron in blood count  Have you had these symptoms before?: Yes  How long have you been having these symptoms?: Greater than 2 weeks  Please list any medications you are currently taking for this condition.: Non petscription iron pills  Please describe any probable cause for these symptoms. : Low iron only

## 2023-12-18 NOTE — ASSESSMENT & PLAN NOTE
Blood pressure was initally elevated in the office which was within normal range upon repeat.  Continue irbesartan along with a low-sodium diet.

## 2023-12-19 LAB
ALBUMIN SERPL-MCNC: 4.5 G/DL (ref 3.5–5.2)
ALBUMIN/GLOB SERPL: 2 G/DL
ALP SERPL-CCNC: 82 U/L (ref 39–117)
ALT SERPL-CCNC: 32 U/L (ref 1–41)
AST SERPL-CCNC: 20 U/L (ref 1–40)
BASOPHILS # BLD AUTO: 0.03 10*3/MM3 (ref 0–0.2)
BASOPHILS NFR BLD AUTO: 0.7 % (ref 0–1.5)
BILIRUB SERPL-MCNC: 0.4 MG/DL (ref 0–1.2)
BUN SERPL-MCNC: 16 MG/DL (ref 8–23)
BUN/CREAT SERPL: 11.9 (ref 7–25)
CALCIUM SERPL-MCNC: 9.6 MG/DL (ref 8.6–10.5)
CHLORIDE SERPL-SCNC: 102 MMOL/L (ref 98–107)
CHOLEST SERPL-MCNC: 125 MG/DL (ref 0–200)
CO2 SERPL-SCNC: 28.5 MMOL/L (ref 22–29)
CREAT SERPL-MCNC: 1.35 MG/DL (ref 0.76–1.27)
EGFRCR SERPLBLD CKD-EPI 2021: 55.8 ML/MIN/1.73
EOSINOPHIL # BLD AUTO: 0.25 10*3/MM3 (ref 0–0.4)
EOSINOPHIL NFR BLD AUTO: 6.2 % (ref 0.3–6.2)
ERYTHROCYTE [DISTWIDTH] IN BLOOD BY AUTOMATED COUNT: 16.4 % (ref 12.3–15.4)
GLOBULIN SER CALC-MCNC: 2.2 GM/DL
GLUCOSE SERPL-MCNC: 89 MG/DL (ref 65–99)
HCT VFR BLD AUTO: 49.3 % (ref 37.5–51)
HDLC SERPL-MCNC: 50 MG/DL (ref 40–60)
HGB BLD-MCNC: 16.6 G/DL (ref 13–17.7)
IMM GRANULOCYTES # BLD AUTO: 0.01 10*3/MM3 (ref 0–0.05)
IMM GRANULOCYTES NFR BLD AUTO: 0.2 % (ref 0–0.5)
LDLC SERPL CALC-MCNC: 62 MG/DL (ref 0–100)
LYMPHOCYTES # BLD AUTO: 1.53 10*3/MM3 (ref 0.7–3.1)
LYMPHOCYTES NFR BLD AUTO: 38.1 % (ref 19.6–45.3)
MCH RBC QN AUTO: 31.1 PG (ref 26.6–33)
MCHC RBC AUTO-ENTMCNC: 33.7 G/DL (ref 31.5–35.7)
MCV RBC AUTO: 92.5 FL (ref 79–97)
MONOCYTES # BLD AUTO: 0.39 10*3/MM3 (ref 0.1–0.9)
MONOCYTES NFR BLD AUTO: 9.7 % (ref 5–12)
NEUTROPHILS # BLD AUTO: 1.81 10*3/MM3 (ref 1.7–7)
NEUTROPHILS NFR BLD AUTO: 45.1 % (ref 42.7–76)
NRBC BLD AUTO-RTO: 0.2 /100 WBC (ref 0–0.2)
PLATELET # BLD AUTO: 270 10*3/MM3 (ref 140–450)
POTASSIUM SERPL-SCNC: 4.8 MMOL/L (ref 3.5–5.2)
PROT SERPL-MCNC: 6.7 G/DL (ref 6–8.5)
PSA SERPL-MCNC: 2.76 NG/ML (ref 0–4)
RBC # BLD AUTO: 5.33 10*6/MM3 (ref 4.14–5.8)
SODIUM SERPL-SCNC: 139 MMOL/L (ref 136–145)
TRIGL SERPL-MCNC: 58 MG/DL (ref 0–150)
TSH SERPL DL<=0.005 MIU/L-ACNC: 1.77 UIU/ML (ref 0.27–4.2)
VLDLC SERPL CALC-MCNC: 13 MG/DL (ref 5–40)
WBC # BLD AUTO: 4.02 10*3/MM3 (ref 3.4–10.8)

## 2024-01-22 ENCOUNTER — PATIENT MESSAGE (OUTPATIENT)
Dept: INTERNAL MEDICINE | Facility: CLINIC | Age: 74
End: 2024-01-22
Payer: MEDICARE

## 2024-01-22 DIAGNOSIS — G89.29 CHRONIC BILATERAL LOW BACK PAIN WITHOUT SCIATICA: ICD-10-CM

## 2024-01-22 DIAGNOSIS — M54.50 CHRONIC BILATERAL LOW BACK PAIN WITHOUT SCIATICA: ICD-10-CM

## 2024-01-22 DIAGNOSIS — I10 BENIGN ESSENTIAL HTN: ICD-10-CM

## 2024-01-22 RX ORDER — LEVOTHYROXINE SODIUM 112 UG/1
TABLET ORAL
Qty: 90 TABLET | Refills: 1 | Status: SHIPPED | OUTPATIENT
Start: 2024-01-22

## 2024-01-22 RX ORDER — IRBESARTAN 300 MG/1
300 TABLET ORAL DAILY
Qty: 90 TABLET | Refills: 1 | Status: SHIPPED | OUTPATIENT
Start: 2024-01-22

## 2024-01-22 RX ORDER — MELOXICAM 15 MG/1
15 TABLET ORAL DAILY PRN
Qty: 90 TABLET | Refills: 1 | Status: SHIPPED | OUTPATIENT
Start: 2024-01-22

## 2024-01-22 RX ORDER — ATORVASTATIN CALCIUM 20 MG/1
20 TABLET, FILM COATED ORAL DAILY
Qty: 90 TABLET | Refills: 1 | Status: SHIPPED | OUTPATIENT
Start: 2024-01-22

## 2024-06-19 ENCOUNTER — OFFICE VISIT (OUTPATIENT)
Dept: INTERNAL MEDICINE | Facility: CLINIC | Age: 74
End: 2024-06-19
Payer: MEDICARE

## 2024-06-19 VITALS
TEMPERATURE: 97.8 F | SYSTOLIC BLOOD PRESSURE: 136 MMHG | WEIGHT: 188 LBS | HEART RATE: 53 BPM | BODY MASS INDEX: 26.92 KG/M2 | DIASTOLIC BLOOD PRESSURE: 84 MMHG | HEIGHT: 70 IN | OXYGEN SATURATION: 100 %

## 2024-06-19 DIAGNOSIS — E78.00 PURE HYPERCHOLESTEROLEMIA: Chronic | ICD-10-CM

## 2024-06-19 DIAGNOSIS — E03.9 ACQUIRED HYPOTHYROIDISM: Chronic | ICD-10-CM

## 2024-06-19 DIAGNOSIS — D50.9 IRON DEFICIENCY ANEMIA, UNSPECIFIED IRON DEFICIENCY ANEMIA TYPE: ICD-10-CM

## 2024-06-19 DIAGNOSIS — Z87.442 HISTORY OF KIDNEY STONES: ICD-10-CM

## 2024-06-19 DIAGNOSIS — M48.061 SPINAL STENOSIS OF LUMBAR REGION WITHOUT NEUROGENIC CLAUDICATION: Chronic | ICD-10-CM

## 2024-06-19 DIAGNOSIS — I10 BENIGN ESSENTIAL HTN: Chronic | ICD-10-CM

## 2024-06-19 DIAGNOSIS — Z00.00 MEDICARE ANNUAL WELLNESS VISIT, SUBSEQUENT: Primary | ICD-10-CM

## 2024-06-19 PROBLEM — R06.83 SNORING: Status: RESOLVED | Noted: 2022-06-06 | Resolved: 2024-06-19

## 2024-06-19 LAB
ALBUMIN SERPL-MCNC: 4.4 G/DL (ref 3.5–5.2)
ALBUMIN/GLOB SERPL: 2.1 G/DL
ALP SERPL-CCNC: 73 U/L (ref 39–117)
ALT SERPL-CCNC: 24 U/L (ref 1–41)
AST SERPL-CCNC: 21 U/L (ref 1–40)
BILIRUB SERPL-MCNC: 0.7 MG/DL (ref 0–1.2)
BUN SERPL-MCNC: 18 MG/DL (ref 8–23)
BUN/CREAT SERPL: 14.6 (ref 7–25)
CALCIUM SERPL-MCNC: 9.6 MG/DL (ref 8.6–10.5)
CHLORIDE SERPL-SCNC: 104 MMOL/L (ref 98–107)
CHOLEST SERPL-MCNC: 126 MG/DL (ref 0–200)
CO2 SERPL-SCNC: 28.4 MMOL/L (ref 22–29)
CREAT SERPL-MCNC: 1.23 MG/DL (ref 0.76–1.27)
EGFRCR SERPLBLD CKD-EPI 2021: 62.4 ML/MIN/1.73
ERYTHROCYTE [DISTWIDTH] IN BLOOD BY AUTOMATED COUNT: 13 % (ref 12.3–15.4)
FERRITIN SERPL-MCNC: 98.3 NG/ML (ref 30–400)
GLOBULIN SER CALC-MCNC: 2.1 GM/DL
GLUCOSE SERPL-MCNC: 89 MG/DL (ref 65–99)
HCT VFR BLD AUTO: 49.7 % (ref 37.5–51)
HDLC SERPL-MCNC: 50 MG/DL (ref 40–60)
HGB BLD-MCNC: 16.5 G/DL (ref 13–17.7)
IRON SATN MFR SERPL: 49 % (ref 20–50)
IRON SERPL-MCNC: 157 MCG/DL (ref 59–158)
LDLC SERPL CALC-MCNC: 60 MG/DL (ref 0–100)
MCH RBC QN AUTO: 33 PG (ref 26.6–33)
MCHC RBC AUTO-ENTMCNC: 33.2 G/DL (ref 31.5–35.7)
MCV RBC AUTO: 99.4 FL (ref 79–97)
PLATELET # BLD AUTO: 276 10*3/MM3 (ref 140–450)
POTASSIUM SERPL-SCNC: 4.8 MMOL/L (ref 3.5–5.2)
PROT SERPL-MCNC: 6.5 G/DL (ref 6–8.5)
RBC # BLD AUTO: 5 10*6/MM3 (ref 4.14–5.8)
SODIUM SERPL-SCNC: 139 MMOL/L (ref 136–145)
TIBC SERPL-MCNC: 319 MCG/DL
TRIGL SERPL-MCNC: 78 MG/DL (ref 0–150)
TSH SERPL DL<=0.005 MIU/L-ACNC: 2.23 UIU/ML (ref 0.27–4.2)
UIBC SERPL-MCNC: 162 MCG/DL (ref 112–346)
VLDLC SERPL CALC-MCNC: 16 MG/DL (ref 5–40)
WBC # BLD AUTO: 3.78 10*3/MM3 (ref 3.4–10.8)

## 2024-06-19 PROCEDURE — 1170F FXNL STATUS ASSESSED: CPT | Performed by: NURSE PRACTITIONER

## 2024-06-19 PROCEDURE — 3079F DIAST BP 80-89 MM HG: CPT | Performed by: NURSE PRACTITIONER

## 2024-06-19 PROCEDURE — 99214 OFFICE O/P EST MOD 30 MIN: CPT | Performed by: NURSE PRACTITIONER

## 2024-06-19 PROCEDURE — 1159F MED LIST DOCD IN RCRD: CPT | Performed by: NURSE PRACTITIONER

## 2024-06-19 PROCEDURE — 1160F RVW MEDS BY RX/DR IN RCRD: CPT | Performed by: NURSE PRACTITIONER

## 2024-06-19 PROCEDURE — 1126F AMNT PAIN NOTED NONE PRSNT: CPT | Performed by: NURSE PRACTITIONER

## 2024-06-19 PROCEDURE — 3075F SYST BP GE 130 - 139MM HG: CPT | Performed by: NURSE PRACTITIONER

## 2024-06-19 PROCEDURE — G0439 PPPS, SUBSEQ VISIT: HCPCS | Performed by: NURSE PRACTITIONER

## 2024-06-19 NOTE — ASSESSMENT & PLAN NOTE
He denies myalgias with atorvastatin which he will continue along with a low-fat, low-cholesterol diet.   Lab Results   Component Value Date    LDL 60 06/19/2024

## 2024-06-19 NOTE — PATIENT INSTRUCTIONS
Medicare Wellness  Personal Prevention Plan of Service     Date of Office Visit:    Encounter Provider:  DONNIE Narayan  Place of Service:  [facility]  Patient Name: Anoop Montenegro  :  1950    As part of the Medicare Wellness portion of your visit today, we are providing you with this personalized preventive plan of services (PPPS). This plan is based upon recommendations of the [place] Preventive Services Task Force (USPSTF) and the Advisory Committee on Immunization Practices (ACIP).    This lists the preventive care services that should be considered, and provides dates of when you are due. Items listed as completed are up-to-date and do not require any further intervention.    Health Maintenance   Topic Date Due    COVID-19 Vaccine ( season) 2024    ANNUAL WELLNESS VISIT  06/15/2024    INFLUENZA VACCINE  2024    COLORECTAL CANCER SCREENING  2025    LIPID PANEL  2025    BMI FOLLOWUP  2025    TDAP/TD VACCINES (5 - Td or Tdap) 2031    HEPATITIS C SCREENING  Completed    RSV Vaccine - Adults  Completed    Pneumococcal Vaccine 65+  Completed    ZOSTER VACCINE  Completed       Orders Placed This Encounter   Procedures    CBC (No Diff)     Order Specific Question:   Release to patient     Answer:   Routine Release [[id number]]     Order Specific Question:   [facility] Has the patient fasted?     Answer:   Yes    Comprehensive Metabolic Panel     Order Specific Question:   Release to patient     Answer:   Routine Release [[id number]]     Order Specific Question:   [facility] Has the patient fasted?     Answer:   Yes    Lipid Panel     Order Specific Question:   Release to patient     Answer:   Routine Release [[id number]]     Order Specific Question:   [facility] Has the patient fasted?     Answer:   Yes    TSH     Order Specific Question:   Release to patient     Answer:   Routine Release [[id number]]     Order Specific  Question:   [facility] Has the patient fasted?     Answer:   Yes    Ferritin     Order Specific Question:   Release to patient     Answer:   Routine Release [[id number]]     Order Specific Question:   [facility] Has the patient fasted?     Answer:   Yes    Iron Profile     Order Specific Question:   Release to patient     Answer:   Routine Release [[id number]]     Order Specific Question:   [facility] Has the patient fasted?     Answer:   Yes       Return in about 6 months (around 12/19/2024).

## 2024-06-19 NOTE — ASSESSMENT & PLAN NOTE
His BP is mildly elevated in the office today despite taking irbesartan daily. He will monitor BP at home for the next 2 weeks and notify office with readings.

## 2024-06-19 NOTE — ASSESSMENT & PLAN NOTE
He has taken an iron supplement since his labs 6/2023 showed a low grade iron anemia. His colonoscopy 3/14/22 was normal, fecal stool negative for blood 6/22/23.  Recheck iron studies today, advised to d/c his iron supplement. Will continue to monitor levels with regular labs.

## 2024-06-19 NOTE — PROGRESS NOTES
The ABCs of the Annual Wellness Visit  Subsequent Medicare Wellness Visit    Subjective    Anoop Montenegro is a 73 y.o. male who presents for a Subsequent Medicare Wellness Visit.    The following portions of the patient's history were reviewed and   updated as appropriate: allergies, current medications, past family history, past medical history, past social history, past surgical history, and problem list.    Compared to one year ago, the patient feels his physical   health is the same.    Compared to one year ago, the patient feels his mental   health is the same.    Recent Hospitalizations:  He was not admitted to the hospital during the last year.       Current Medical Providers:  Patient Care Team:  Cathy Muhammad APRN as PCP - General (Internal Medicine)  Elise Johnson MD as Consulting Physician (Hematology and Oncology)  Emma Ludwig APRN (Family Medicine)  Harsh Strauss MD (Ophthalmology)  Heriberto Giang DMD (Dental General Practice)    Outpatient Medications Prior to Visit   Medication Sig Dispense Refill    atorvastatin (LIPITOR) 20 MG tablet Take 1 tablet by mouth Daily. 90 tablet 1    irbesartan (AVAPRO) 300 MG tablet Take 1 tablet by mouth Daily. 90 tablet 1    levothyroxine (SYNTHROID, LEVOTHROID) 112 MCG tablet TAKE 1 TABLET BY MOUTH DAILY 90 tablet 1    meloxicam (MOBIC) 15 MG tablet Take 1 tablet by mouth Daily As Needed for Moderate Pain. Take with food 90 tablet 1    sildenafil (VIAGRA) 100 MG tablet Take 1 tablet by mouth Daily As Needed for Erectile Dysfunction. 10 tablet 3    RSVPreF3 Vac Recomb Adjuvanted (Arexvy) 120 MCG/0.5ML reconstituted suspension injection Inject  into the appropriate muscle as directed by prescriber. 1 each 0     No facility-administered medications prior to visit.       No opioid medication identified on active medication list. I have reviewed chart for other potential  high risk medication/s and harmful drug interactions in the  "elderly.        Aspirin is not on active medication list.  Aspirin use is not indicated based on review of current medical condition/s. Risk of harm outweighs potential benefits.  .    Patient Active Problem List   Diagnosis    Cervical spinal stenosis    DDD (degenerative disc disease), cervical    Benign essential HTN    Raynaud's phenomenon without gangrene    Chronic bilateral low back pain without sciatica    Hypothyroidism    Insomnia    Hyperlipidemia    Erectile dysfunction    Spinal stenosis of lumbar region without neurogenic claudication    Hypersomnia, unspecified    HANNAH (obstructive sleep apnea)    Epidermoid cyst of skin of scrotum    Impingement syndrome of right shoulder    Impingement syndrome of left shoulder    Basal cell carcinoma (BCC) of scalp    Iron deficiency anemia    History of kidney stones     Advance Care Planning   Advance Care Planning     Advance Directive is not on file.  ACP discussion was held with the patient during this visit. Patient has an advance directive (not in EMR), copy requested.     Objective    Vitals:    06/19/24 0902   BP: 136/84   BP Location: Left arm   Patient Position: Sitting   Cuff Size: Adult   Pulse: 53   Temp: 97.8 °F (36.6 °C)   SpO2: 100%   Weight: 85.3 kg (188 lb)   Height: 177.8 cm (70\")   PainSc: 0-No pain     Estimated body mass index is 26.98 kg/m² as calculated from the following:    Height as of this encounter: 177.8 cm (70\").    Weight as of this encounter: 85.3 kg (188 lb).           Does the patient have evidence of cognitive impairment? No    Lab Results   Component Value Date    CHLPL 126 06/19/2024    TRIG 78 06/19/2024    HDL 50 06/19/2024    LDL 60 06/19/2024    VLDL 16 06/19/2024        HEALTH RISK ASSESSMENT    Smoking Status:  Social History     Tobacco Use   Smoking Status Never   Smokeless Tobacco Never     Alcohol Consumption:  Social History     Substance and Sexual Activity   Alcohol Use Yes    Alcohol/week: 2.0 standard drinks of " alcohol    Types: 1 Glasses of wine, 1 Cans of beer per week    Comment: 3-5 times a week     Fall Risk Screen:    JUAN MANUELMARCELLO Fall Risk Assessment was completed, and patient is at LOW risk for falls.Assessment completed on:2024    Depression Screenin/19/2024     9:07 AM   PHQ-2/PHQ-9 Depression Screening   Little Interest or Pleasure in Doing Things 0-->not at all   Feeling Down, Depressed or Hopeless 0-->not at all   PHQ-9: Brief Depression Severity Measure Score 0       Health Habits and Functional and Cognitive Screenin/19/2024     7:12 AM   Functional & Cognitive Status   Do you have difficulty preparing food and eating? No   Do you have difficulty bathing yourself, getting dressed or grooming yourself? No   Do you have difficulty using the toilet? No   Do you have difficulty moving around from place to place? No   Do you have trouble with steps or getting out of a bed or a chair? No   Current Diet Well Balanced Diet   Dental Exam Up to date   Eye Exam Up to date   Exercise (times per week) 5 times per week   Current Exercises Include Gardening;Hiking;Home Fitness Gym;House Cleaning;Stair Step Machine;Walking;Yard Work   Do you need help using the phone?  No   Are you deaf or do you have serious difficulty hearing?  No   Do you need help to go to places out of walking distance? No   Do you need help shopping? No   Do you need help preparing meals?  No   Do you need help with housework?  No   Do you need help with laundry? No   Do you need help taking your medications? No   Do you need help managing money? No   Do you ever drive or ride in a car without wearing a seat belt? No   Have you felt unusual stress, anger or loneliness in the last month? No   Who do you live with? Spouse   If you need help, do you have trouble finding someone available to you? No   Have you been bothered in the last four weeks by sexual problems? No   Do you have difficulty concentrating, remembering or making  decisions? No       Age-appropriate Screening Schedule:  Refer to the list below for future screening recommendations based on patient's age, sex and/or medical conditions. Orders for these recommended tests are listed in the plan section. The patient has been provided with a written plan.    Health Maintenance   Topic Date Due    COVID-19 Vaccine (9 - 2023-24 season) 01/03/2024    ANNUAL WELLNESS VISIT  06/15/2024    INFLUENZA VACCINE  08/01/2024    COLORECTAL CANCER SCREENING  03/14/2025    LIPID PANEL  06/19/2025    BMI FOLLOWUP  06/19/2025    TDAP/TD VACCINES (5 - Td or Tdap) 05/13/2031    HEPATITIS C SCREENING  Completed    RSV Vaccine - Adults  Completed    Pneumococcal Vaccine 65+  Completed    ZOSTER VACCINE  Completed                  CMS Preventative Services Quick Reference  Risk Factors Identified During Encounter  Elevated blood pressure  The above risks/problems have been discussed with the patient.  Pertinent information has been shared with the patient in the After Visit Summary.  An After Visit Summary and PPPS were made available to the patient.    Follow Up:   Next Medicare Wellness visit to be scheduled in 1 year.       Additional E&M Note during same encounter follows:  Patient has multiple medical problems which are significant and separately identifiable that require additional work above and beyond the Medicare Wellness Visit.      Chief Complaint  Medicare Wellness-subsequent, Hypertension (About 2 weeks ago 140/90s), and Follow-up (Colorectal screening?//Patient states he is taking Iron daily - continue?)    Subjective        Hypertension  This is a chronic problem. The current episode started more than 1 year ago. The problem has been improved since onset. Pertinent negatives include no anxiety, blurred vision, chest pain, headaches, malaise/fatigue, orthopnea, palpitations, peripheral edema or shortness of breath. Agents associated with hypertension include decongestants and thyroid  "hormones. There are no compliance problems.      Anoop Montenegro is also being seen today for f/u regarding HTN, chronic low back pain and hypothyroidism.    He has monitored his BP at home and believes his BP has increased since his last visit despite taking irbesartan daily. He reports feeling well, notes BP was typically 133/78 last year and now 140/90 at home. He denies chest pain and/or shortness of breath.    He has taken an iron supplement since his labs 6/2023 showed a low grade iron anemia. His colonoscopy 3/14/22 was normal, fecal stool negative for blood 6/22/23.    He was seen in the ER 12/28/23 due to left lower quadrant pain, CT abd/pelvis showed bilateral kidney stones. He denies any symptoms since then.    Review of Systems   Constitutional:  Negative for malaise/fatigue.   HENT:  Positive for congestion and postnasal drip.    Eyes:  Negative for blurred vision.   Respiratory:  Negative for shortness of breath.    Cardiovascular:  Negative for chest pain, palpitations and orthopnea.   Musculoskeletal:  Positive for arthralgias and back pain.       Objective   Vital Signs:  /84 (BP Location: Left arm, Patient Position: Sitting, Cuff Size: Adult)   Pulse 53   Temp 97.8 °F (36.6 °C)   Ht 177.8 cm (70\")   Wt 85.3 kg (188 lb)   SpO2 100%   BMI 26.98 kg/m²     Physical Exam  Constitutional:       Appearance: He is well-developed. He is not ill-appearing.   HENT:      Head: Normocephalic.      Right Ear: Tympanic membrane and external ear normal. Decreased hearing noted.      Left Ear: Tympanic membrane and external ear normal. Decreased hearing noted.      Ears:      Comments: Bilateral hearing aids which were removed for exam     Nose: Nose normal. No nasal deformity, mucosal edema or rhinorrhea.      Right Sinus: No maxillary sinus tenderness or frontal sinus tenderness.      Left Sinus: No maxillary sinus tenderness or frontal sinus tenderness.      Mouth/Throat:      Dentition: Normal " dentition.      Pharynx: Posterior oropharyngeal erythema present.   Eyes:      General: Lids are normal.         Right eye: No discharge.         Left eye: No discharge.      Conjunctiva/sclera: Conjunctivae normal.      Right eye: No exudate.     Left eye: No exudate.  Neck:      Thyroid: No thyroid mass or thyromegaly.      Vascular: No carotid bruit.      Trachea: Trachea normal.   Cardiovascular:      Rate and Rhythm: Regular rhythm.      Pulses: Normal pulses.      Heart sounds: Normal heart sounds. No murmur heard.  Pulmonary:      Effort: No respiratory distress.      Breath sounds: Normal breath sounds. No decreased breath sounds, wheezing, rhonchi or rales.   Abdominal:      General: Bowel sounds are normal.      Palpations: Abdomen is soft.      Tenderness: There is no abdominal tenderness.   Musculoskeletal:      Cervical back: Normal range of motion. No edema.   Lymphadenopathy:      Head:      Right side of head: No submental, submandibular, tonsillar, preauricular, posterior auricular or occipital adenopathy.      Left side of head: No submental, submandibular, tonsillar, preauricular, posterior auricular or occipital adenopathy.   Skin:     General: Skin is warm and dry.      Nails: There is no clubbing.   Neurological:      Mental Status: He is alert.   Psychiatric:         Behavior: Behavior is cooperative.          The following data was reviewed by: DONNIE Narayan on 06/19/2024:  Common labs          12/18/2023    09:19 12/28/2023    07:41 6/19/2024    09:56   Common Labs   Glucose 89   89    BUN 16   18    Creatinine 1.35   1.23    Sodium 139   139    Potassium 4.8   4.8    Chloride 102   104    Calcium 9.6   9.6    Total Protein 6.7   6.5    Albumin 4.5   4.4    Total Bilirubin 0.4   0.7    Alkaline Phosphatase 82   73    AST (SGOT) 20   21    ALT (SGPT) 32   24    WBC 4.02  10.08     3.78    Hemoglobin 16.6  17.3     16.5    Hematocrit 49.3  51.6     49.7    Platelets 270  312     276     Total Cholesterol 125   126    Triglycerides 58   78    HDL Cholesterol 50   50    LDL Cholesterol  62   60    PSA 2.760         Details          This result is from an external source.             Data reviewed : Radiologic studies CT abd/pelvis 12/28/23 and Recent hospitalization notes 12/28/23 ER notes           Assessment and Plan   Diagnoses and all orders for this visit:    1. Medicare annual wellness visit, subsequent (Primary)    2. Benign essential HTN  Assessment & Plan:  His BP is mildly elevated in the office today despite taking irbesartan daily. He will monitor BP at home for the next 2 weeks and notify office with readings.    Orders:  -     CBC (No Diff)  -     Comprehensive Metabolic Panel  -     Lipid Panel    3. Spinal stenosis of lumbar region without neurogenic claudication  Assessment & Plan:  He notes recurrent low back pain which is stable, continue to monitor.      4. Acquired hypothyroidism  Assessment & Plan:  He is currently managed on Synthroid 112 mcg daily for replacement, recheck TSH.    Orders:  -     TSH    5. Iron deficiency anemia, unspecified iron deficiency anemia type  Assessment & Plan:  He has taken an iron supplement since his labs 6/2023 showed a low grade iron anemia. His colonoscopy 3/14/22 was normal, fecal stool negative for blood 6/22/23.  Recheck iron studies today, advised to d/c his iron supplement. Will continue to monitor levels with regular labs.    Orders:  -     Ferritin  -     Iron Profile    6. Pure hypercholesterolemia  Assessment & Plan:  He denies myalgias with atorvastatin which he will continue along with a low-fat, low-cholesterol diet.   Lab Results   Component Value Date    LDL 60 06/19/2024         7. History of kidney stones  Assessment & Plan:  He was seen in the ER 12/28/23 for LLQ abdominal pain, CT abd/pelvis showed bilateral kidney stones. Denies pain since then,, continue to monitor.               Follow Up   Return in about 6 months (around  12/19/2024).  Patient was given instructions and counseling regarding his condition or for health maintenance advice. Please see specific information pulled into the AVS if appropriate.           Answers submitted by the patient for this visit:  Primary Reason for Visit (Submitted on 6/18/2024)  What is the primary reason for your visit?: High Blood Pressure

## 2024-06-19 NOTE — ASSESSMENT & PLAN NOTE
He was seen in the ER 12/28/23 for LLQ abdominal pain, CT abd/pelvis showed bilateral kidney stones. Denies pain since then,, continue to monitor.

## 2024-06-27 DIAGNOSIS — M54.50 CHRONIC BILATERAL LOW BACK PAIN WITHOUT SCIATICA: ICD-10-CM

## 2024-06-27 DIAGNOSIS — I10 BENIGN ESSENTIAL HTN: ICD-10-CM

## 2024-06-27 DIAGNOSIS — G89.29 CHRONIC BILATERAL LOW BACK PAIN WITHOUT SCIATICA: ICD-10-CM

## 2024-06-27 RX ORDER — MELOXICAM 15 MG/1
TABLET ORAL
Qty: 90 TABLET | Refills: 3 | Status: SHIPPED | OUTPATIENT
Start: 2024-06-27

## 2024-06-27 RX ORDER — ATORVASTATIN CALCIUM 20 MG/1
20 TABLET, FILM COATED ORAL DAILY
Qty: 90 TABLET | Refills: 3 | Status: SHIPPED | OUTPATIENT
Start: 2024-06-27

## 2024-06-27 RX ORDER — IRBESARTAN 300 MG/1
300 TABLET ORAL DAILY
Qty: 90 TABLET | Refills: 3 | Status: SHIPPED | OUTPATIENT
Start: 2024-06-27

## 2024-06-27 RX ORDER — LEVOTHYROXINE SODIUM 112 UG/1
TABLET ORAL
Qty: 90 TABLET | Refills: 3 | Status: SHIPPED | OUTPATIENT
Start: 2024-06-27

## 2024-12-23 ENCOUNTER — OFFICE VISIT (OUTPATIENT)
Dept: INTERNAL MEDICINE | Facility: CLINIC | Age: 74
End: 2024-12-23
Payer: MEDICARE

## 2024-12-23 VITALS
DIASTOLIC BLOOD PRESSURE: 80 MMHG | SYSTOLIC BLOOD PRESSURE: 150 MMHG | HEART RATE: 68 BPM | HEIGHT: 70 IN | RESPIRATION RATE: 20 BRPM | TEMPERATURE: 97.8 F | OXYGEN SATURATION: 100 % | BODY MASS INDEX: 27.43 KG/M2 | WEIGHT: 191.6 LBS

## 2024-12-23 DIAGNOSIS — E78.00 PURE HYPERCHOLESTEROLEMIA: Chronic | ICD-10-CM

## 2024-12-23 DIAGNOSIS — Z12.5 SCREENING FOR PROSTATE CANCER: ICD-10-CM

## 2024-12-23 DIAGNOSIS — R73.09 ELEVATED GLUCOSE: ICD-10-CM

## 2024-12-23 DIAGNOSIS — I10 BENIGN ESSENTIAL HTN: Primary | Chronic | ICD-10-CM

## 2024-12-23 DIAGNOSIS — Z12.11 ENCOUNTER FOR SCREENING FOR MALIGNANT NEOPLASM OF COLON: ICD-10-CM

## 2024-12-23 DIAGNOSIS — E03.9 ACQUIRED HYPOTHYROIDISM: Chronic | ICD-10-CM

## 2024-12-23 LAB
ALBUMIN SERPL-MCNC: 4.1 G/DL (ref 3.5–5.2)
ALBUMIN/GLOB SERPL: 1.6 G/DL
ALP SERPL-CCNC: 87 U/L (ref 39–117)
ALT SERPL-CCNC: 21 U/L (ref 1–41)
AST SERPL-CCNC: 19 U/L (ref 1–40)
BILIRUB SERPL-MCNC: 0.5 MG/DL (ref 0–1.2)
BUN SERPL-MCNC: 17 MG/DL (ref 8–23)
BUN/CREAT SERPL: 12.9 (ref 7–25)
CALCIUM SERPL-MCNC: 9.8 MG/DL (ref 8.6–10.5)
CHLORIDE SERPL-SCNC: 104 MMOL/L (ref 98–107)
CHOLEST SERPL-MCNC: 137 MG/DL (ref 0–200)
CO2 SERPL-SCNC: 27.8 MMOL/L (ref 22–29)
CREAT SERPL-MCNC: 1.32 MG/DL (ref 0.76–1.27)
EGFRCR SERPLBLD CKD-EPI 2021: 57 ML/MIN/1.73
ERYTHROCYTE [DISTWIDTH] IN BLOOD BY AUTOMATED COUNT: 12.7 % (ref 12.3–15.4)
GLOBULIN SER CALC-MCNC: 2.6 GM/DL
GLUCOSE SERPL-MCNC: 89 MG/DL (ref 65–99)
HBA1C MFR BLD: 5.7 % (ref 4.8–5.6)
HCT VFR BLD AUTO: 49.9 % (ref 37.5–51)
HDLC SERPL-MCNC: 50 MG/DL (ref 40–60)
HGB BLD-MCNC: 16.9 G/DL (ref 13–17.7)
LDLC SERPL CALC-MCNC: 73 MG/DL (ref 0–100)
MCH RBC QN AUTO: 32.6 PG (ref 26.6–33)
MCHC RBC AUTO-ENTMCNC: 33.9 G/DL (ref 31.5–35.7)
MCV RBC AUTO: 96.3 FL (ref 79–97)
PLATELET # BLD AUTO: 299 10*3/MM3 (ref 140–450)
POTASSIUM SERPL-SCNC: 4.7 MMOL/L (ref 3.5–5.2)
PROT SERPL-MCNC: 6.7 G/DL (ref 6–8.5)
PSA SERPL-MCNC: 3.94 NG/ML (ref 0–4)
RBC # BLD AUTO: 5.18 10*6/MM3 (ref 4.14–5.8)
SODIUM SERPL-SCNC: 138 MMOL/L (ref 136–145)
TRIGL SERPL-MCNC: 69 MG/DL (ref 0–150)
TSH SERPL DL<=0.005 MIU/L-ACNC: 1.68 UIU/ML (ref 0.27–4.2)
VLDLC SERPL CALC-MCNC: 14 MG/DL (ref 5–40)
WBC # BLD AUTO: 4.33 10*3/MM3 (ref 3.4–10.8)

## 2024-12-23 PROCEDURE — 99214 OFFICE O/P EST MOD 30 MIN: CPT | Performed by: NURSE PRACTITIONER

## 2024-12-23 PROCEDURE — 1159F MED LIST DOCD IN RCRD: CPT | Performed by: NURSE PRACTITIONER

## 2024-12-23 PROCEDURE — 1160F RVW MEDS BY RX/DR IN RCRD: CPT | Performed by: NURSE PRACTITIONER

## 2024-12-23 PROCEDURE — 3077F SYST BP >= 140 MM HG: CPT | Performed by: NURSE PRACTITIONER

## 2024-12-23 PROCEDURE — 3079F DIAST BP 80-89 MM HG: CPT | Performed by: NURSE PRACTITIONER

## 2024-12-23 PROCEDURE — 1126F AMNT PAIN NOTED NONE PRSNT: CPT | Performed by: NURSE PRACTITIONER

## 2024-12-23 PROCEDURE — G2211 COMPLEX E/M VISIT ADD ON: HCPCS | Performed by: NURSE PRACTITIONER

## 2025-01-01 PROBLEM — R73.09 ELEVATED GLUCOSE: Status: ACTIVE | Noted: 2025-01-01

## 2025-01-02 NOTE — ASSESSMENT & PLAN NOTE
His blood pressure readings have been consistently within the normal range, with the exception of a single elevated reading today, which may be attributed to recent coffee consumption. He is advised to monitor his blood pressure regularly and maintain a journal for documentation. Continue irbesartan daily.

## 2025-01-02 NOTE — ASSESSMENT & PLAN NOTE
His TSH levels have remained stable, typically within the range of 1.7 to 2.0. He will continue Synthroid 112 mcg daily, recheck TSH today.

## 2025-01-02 NOTE — ASSESSMENT & PLAN NOTE
His A1c was mildly elevated at 6.0 with last year's labs, remains active and tries to monitor carb intake. Recheck A1c today.

## 2025-01-02 NOTE — ASSESSMENT & PLAN NOTE
LDL was 60 with recent labs, currently managed on atorvastatin which he is tolerating well. Recheck lipid panel today.

## 2025-01-02 NOTE — PROGRESS NOTES
Chief Complaint  Hypertension  Subjective        Anoop Montenegro presents to [facility]  Hypertension  This is a chronic problem. The current episode started more than 1 year ago. The problem has been stable since onset. Pertinent negatives include no anxiety, blurred vision, chest pain, headaches, malaise/fatigue, orthopnea, palpitations, peripheral edema or shortness of breath. Agents associated with hypertension include thyroid hormones. There are no compliance problems.      History of Present Illness  The patient presents for evaluation of HTN, hypothyroidism, back pain, left ear pain, and health maintenance.    He reports an elevated blood pressure reading at home this morning, which was over 105, but upon arrival to the clinic, it was over 80. He has not experienced any associated symptoms such as dizziness.     He engages in elliptical exercises at home, achieving a pulse rate in the 130s, occasionally reaching 140, without experiencing any discomfort. He also uses arm and leg exercises to strengthen his lower extremities.     He experienced lower back pain a few weeks ago, which persisted for several days but did not impede his mobility. He continues to take meloxicam as needed and occasionally uses Relafen, incorporating it into his 14-day pill regimen for one week every other day.    Three days ago, he experienced a shooting pain in his left ear, which was exacerbated by pressure but has since resolved. He reports no congestion or drainage at the time of the ear pain. He occasionally takes over-the-counter decongestants, which he finds effective, but has not used them in the past few days. He also reports frequent cracking and movement sounds in his ears. He does not have any issues with TMJ but uses a snoring device that requires adjustment each morning.    He has regular bowel movements and occasionally notices blood when wiping. He takes Metamucil to aid digestion. He is  "due for a colonoscopy in 03/2025. He had a colonoscopy on 03/14/2022, which revealed tubular adenomas.     He had a consultation with dermatology in 07/2024, during which no new findings were reported.    Objective   Vital Signs:  /80 (BP Location: Right arm)   Pulse 68   Temp 97.8 °F (36.6 °C) (Oral)   Resp 20   Ht 177.8 cm (70\")   Wt 86.9 kg (191 lb 9.6 oz)   SpO2 100%   BMI 27.49 kg/m²   Estimated body mass index is 27.49 kg/m² as calculated from the following:    Height as of this encounter: 177.8 cm (70\").    Weight as of this encounter: 86.9 kg (191 lb 9.6 oz).            Physical Exam  Constitutional:       Appearance: He is well-developed. He is not ill-appearing.   HENT:      Head: Normocephalic.      Right Ear: Decreased hearing noted.      Left Ear: Decreased hearing noted.      Ears:      Comments: Bilateral hearing aids     Nose: Nose normal. No nasal deformity, mucosal edema or rhinorrhea.      Right Sinus: No maxillary sinus tenderness or frontal sinus tenderness.      Left Sinus: No maxillary sinus tenderness or frontal sinus tenderness.      Mouth/Throat:      Dentition: Normal dentition.   Eyes:      General: Lids are normal.         Right eye: No discharge.         Left eye: No discharge.      Conjunctiva/sclera: Conjunctivae normal.      Right eye: No exudate.     Left eye: No exudate.  Neck:      Thyroid: No thyroid mass or thyromegaly.      Vascular: No carotid bruit.      Trachea: Trachea normal.   Cardiovascular:      Rate and Rhythm: Regular rhythm.      Pulses: Normal pulses.      Heart sounds: Normal heart sounds. No murmur heard.  Pulmonary:      Effort: No respiratory distress.      Breath sounds: Normal breath sounds. No decreased breath sounds, wheezing, rhonchi or rales.   Abdominal:      General: Bowel sounds are normal.      Palpations: Abdomen is soft.      Tenderness: There is no abdominal tenderness.   Musculoskeletal:      Cervical back: Normal range of motion. No " edema.   Lymphadenopathy:      Head:      Right side of head: No submental, submandibular, tonsillar, preauricular, posterior auricular or occipital adenopathy.      Left side of head: No submental, submandibular, tonsillar, preauricular, posterior auricular or occipital adenopathy.   Skin:     General: Skin is warm and dry.      Nails: There is no clubbing.   Neurological:      Mental Status: He is alert.   Psychiatric:         Behavior: Behavior is cooperative.        Physical Exam      Result Review :  The following data was reviewed by: DONNIE Narayan on 12/23/2024:  Common labs          6/19/2024    09:56 12/23/2024    08:58   Common Labs   Glucose 89  89    BUN 18  17    Creatinine 1.23  1.32    Sodium 139  138    Potassium 4.8  4.7    Chloride 104  104    Calcium 9.6  9.8    Total Protein 6.5  6.7    Albumin 4.4  4.1    Total Bilirubin 0.7  0.5    Alkaline Phosphatase 73  87    AST (SGOT) 21  19    ALT (SGPT) 24  21    WBC 3.78  4.33    Hemoglobin 16.5  16.9    Hematocrit 49.7  49.9    Platelets 276  299    Total Cholesterol 126  137    Triglycerides 78  69    HDL Cholesterol 50  50    LDL Cholesterol  60  73    Hemoglobin A1C  5.70    PSA  3.940      Data reviewed : Radiologic studies CT abd/pelvis 12/28/23      Results  Laboratory Studies  TSH was 2.2. Blood sugar was 89 in June 2024.    Imaging  CT scan abd/pelvis 12/28/2023 showed presence of kidney stones.             Assessment and Plan   Diagnoses and all orders for this visit:    1. Benign essential HTN (Primary)  Assessment & Plan:  His blood pressure readings have been consistently within the normal range, with the exception of a single elevated reading today, which may be attributed to recent coffee consumption. He is advised to monitor his blood pressure regularly and maintain a journal for documentation. Continue irbesartan daily.    Orders:  -     CBC (No Diff)  -     Comprehensive Metabolic Panel    2. Pure  hypercholesterolemia  Assessment & Plan:  LDL was 60 with recent labs, currently managed on atorvastatin which he is tolerating well. Recheck lipid panel today.    Orders:  -     Lipid Panel    3. Acquired hypothyroidism  Assessment & Plan:  His TSH levels have remained stable, typically within the range of 1.7 to 2.0. He will continue Synthroid 112 mcg daily, recheck TSH today.    Orders:  -     TSH    4. Elevated glucose  Assessment & Plan:  His A1c was mildly elevated at 6.0 with last year's labs, remains active and tries to monitor carb intake. Recheck A1c today.    Orders:  -     Hemoglobin A1c    5. Screening for prostate cancer  -     PSA Screen    6. Encounter for screening for malignant neoplasm of colon  -     Ambulatory Referral For Screening Colonoscopy      Assessment & Plan           Follow Up   Return in about 6 months (around 6/23/2025).  Patient was given instructions and counseling regarding his condition or for health maintenance advice. Please see specific information pulled into the AVS if appropriate.     Patient or patient representative verbalized consent for the use of Ambient Listening during the visit with  DONNIE Narayan for chart documentation. 1/1/2025  20:25 EST  Answers submitted by the patient for this visit:  Primary Reason for Visit (Submitted on 12/17/2024)  What is the primary reason for your visit?: High Blood Pressure

## 2025-01-29 ENCOUNTER — TELEPHONE (OUTPATIENT)
Dept: GASTROENTEROLOGY | Facility: CLINIC | Age: 75
End: 2025-01-29
Payer: MEDICARE

## 2025-01-29 DIAGNOSIS — D12.6 ADENOMATOUS POLYP OF COLON, UNSPECIFIED PART OF COLON: Primary | ICD-10-CM

## 2025-01-29 NOTE — TELEPHONE ENCOUNTER
Last colonoscopy 3/14/22     Personal hx polyps    Family hx polyps    No family hx of cx    ASA or blood thinners:  Meloxicam    List medications:  Levothyroxine  Atorvastatin  Meloxicam  Irbesartan    OA form scanned in media

## 2025-01-30 ENCOUNTER — TELEPHONE (OUTPATIENT)
Dept: GASTROENTEROLOGY | Facility: CLINIC | Age: 75
End: 2025-01-30
Payer: MEDICARE

## 2025-01-31 ENCOUNTER — TELEPHONE (OUTPATIENT)
Dept: GASTROENTEROLOGY | Facility: CLINIC | Age: 75
End: 2025-01-31
Payer: MEDICARE

## 2025-01-31 NOTE — TELEPHONE ENCOUNTER
ZORAIDA PACHECO    IN SCHEDULING PT SCHEDULED 03/07/2025 ARRIVING AT 830AM CLS PREP INFORMATION UPLOADED TO MY CHART OK FOR HUB TO RELAY

## 2025-03-07 ENCOUNTER — HOSPITAL ENCOUNTER (OUTPATIENT)
Facility: HOSPITAL | Age: 75
Setting detail: HOSPITAL OUTPATIENT SURGERY
Discharge: HOME | End: 2025-03-07
Attending: INTERNAL MEDICINE | Admitting: INTERNAL MEDICINE
Payer: MEDICARE

## 2025-03-07 ENCOUNTER — ANESTHESIA EVENT (OUTPATIENT)
Dept: GASTROENTEROLOGY | Facility: HOSPITAL | Age: 75
End: 2025-03-07
Payer: MEDICARE

## 2025-03-07 ENCOUNTER — ANESTHESIA (OUTPATIENT)
Dept: GASTROENTEROLOGY | Facility: HOSPITAL | Age: 75
End: 2025-03-07
Payer: MEDICARE

## 2025-03-07 VITALS
RESPIRATION RATE: 17 BRPM | WEIGHT: 190 LBS | SYSTOLIC BLOOD PRESSURE: 122 MMHG | DIASTOLIC BLOOD PRESSURE: 88 MMHG | HEIGHT: 70 IN | HEART RATE: 59 BPM | BODY MASS INDEX: 27.2 KG/M2 | OXYGEN SATURATION: 97 %

## 2025-03-07 DIAGNOSIS — D12.6 ADENOMATOUS POLYP OF COLON, UNSPECIFIED PART OF COLON: ICD-10-CM

## 2025-03-07 PROCEDURE — 25010000002 LIDOCAINE 2% SOLUTION: Performed by: NURSE ANESTHETIST, CERTIFIED REGISTERED

## 2025-03-07 PROCEDURE — 25010000002 PROPOFOL 10 MG/ML EMULSION: Performed by: NURSE ANESTHETIST, CERTIFIED REGISTERED

## 2025-03-07 PROCEDURE — 88305 TISSUE EXAM BY PATHOLOGIST: CPT | Performed by: INTERNAL MEDICINE

## 2025-03-07 PROCEDURE — 45380 COLONOSCOPY AND BIOPSY: CPT | Performed by: INTERNAL MEDICINE

## 2025-03-07 PROCEDURE — 25810000003 LACTATED RINGERS PER 1000 ML: Performed by: INTERNAL MEDICINE

## 2025-03-07 RX ORDER — LIDOCAINE HYDROCHLORIDE 20 MG/ML
INJECTION, SOLUTION INFILTRATION; PERINEURAL AS NEEDED
Status: DISCONTINUED | OUTPATIENT
Start: 2025-03-07 | End: 2025-03-07 | Stop reason: SURG

## 2025-03-07 RX ORDER — SODIUM CHLORIDE, SODIUM LACTATE, POTASSIUM CHLORIDE, CALCIUM CHLORIDE 600; 310; 30; 20 MG/100ML; MG/100ML; MG/100ML; MG/100ML
20 INJECTION, SOLUTION INTRAVENOUS CONTINUOUS
Status: DISCONTINUED | OUTPATIENT
Start: 2025-03-07 | End: 2025-03-07 | Stop reason: HOSPADM

## 2025-03-07 RX ORDER — PROPOFOL 10 MG/ML
VIAL (ML) INTRAVENOUS AS NEEDED
Status: DISCONTINUED | OUTPATIENT
Start: 2025-03-07 | End: 2025-03-07 | Stop reason: SURG

## 2025-03-07 RX ADMIN — SODIUM CHLORIDE, SODIUM LACTATE, POTASSIUM CHLORIDE, AND CALCIUM CHLORIDE 20 ML/HR: .6; .31; .03; .02 INJECTION, SOLUTION INTRAVENOUS at 09:15

## 2025-03-07 RX ADMIN — LIDOCAINE HYDROCHLORIDE 60 MG: 20 INJECTION, SOLUTION INFILTRATION; PERINEURAL at 10:18

## 2025-03-07 RX ADMIN — PROPOFOL 180 MCG/KG/MIN: 10 INJECTION, EMULSION INTRAVENOUS at 10:19

## 2025-03-07 RX ADMIN — PROPOFOL 100 MG: 10 INJECTION, EMULSION INTRAVENOUS at 10:18

## 2025-03-07 NOTE — DISCHARGE INSTRUCTIONS
For the next 24 hours patient needs to be with a responsible adult.    For 24 hours DO NOT drive, operate machinery, appliances, drink alcohol, make important decisions or sign legal documents.    Start with a light or bland diet if you are feeling sick to your stomach otherwise advance to regular diet as tolerated.    Follow recommendations on procedure report if provided by your doctor.    Call Dr Montemayor for problems [phone]    Problems may include but not limited to: large amounts of bleeding, trouble breathing, repeated vomiting, severe unrelieved pain, fever or chills.

## 2025-03-07 NOTE — ANESTHESIA POSTPROCEDURE EVALUATION
Patient: Anoop Montenegro    Procedure Summary       Date: 03/07/25 Room / Location:  [facility]    Anesthesia Start: 1013 Anesthesia Stop: 1038    Procedure: COLONOSCOPY to TI w cold polypectomy Diagnosis:       Polyp of colon      Hemorrhoids      Tortuous colon      (Adenomatous polyp of colon, unspecified part of colon [D12.6])    Surgeons: Ronn Montemayor MD Provider: Treva Perez MD    Anesthesia Type: MAC ASA Status: 2            Anesthesia Type: MAC    Vitals  Vitals Value Taken Time   /88 03/07/25 1100   Temp     Pulse 59 03/07/25 1100   Resp 17 03/07/25 1100   SpO2 97 % 03/07/25 1100           Post Anesthesia Care and Evaluation    Patient location during evaluation: bedside  Patient participation: complete - patient participated  Level of consciousness: awake  Pain management: adequate    Airway patency: patent  Anesthetic complications: No anesthetic complications    Cardiovascular status: acceptable  Respiratory status: acceptable  Hydration status: acceptable

## 2025-03-07 NOTE — ANESTHESIA PREPROCEDURE EVALUATION
Anesthesia Evaluation                  Airway   Mallampati: II  TM distance: >3 FB  Neck ROM: full  No difficulty expected  Dental - normal exam     Pulmonary    (+) ,sleep apnea  Cardiovascular     (+) hypertension less than 2 medications, hyperlipidemia      Neuro/Psych  (+) psychiatric history Anxiety  GI/Hepatic/Renal/Endo    (+) GERD, thyroid problem     Musculoskeletal     Abdominal    Substance History      OB/GYN          Other   arthritis,   history of cancer                Anesthesia Plan    ASA 2     MAC     intravenous induction     Anesthetic plan, risks, benefits, and alternatives have been provided, discussed and informed consent has been obtained with: patient.    CODE STATUS:

## 2025-03-07 NOTE — H&P
[facility]  Pre Procedure History & Physical    Chief Complaint:   Polyps, family history    Subjective     HPI:   This 74-year-old male presents the endoscopy suite for colonoscopic examination.  He has a personal history of polyps and a family history of polyps.  Last colonoscopy performed in 2022 with adenomatous polyps removed.    Past Medical History:   Past Medical History:   Diagnosis Date    Allergic rhinitis     Anxiety 06/2017    ((Pt Qnr Sub: remove per patient)) mild, occational    Arthritis     Arthritis of back 1/1/1990    Cancer     skin    Cataract 1/1/2020    Surgery around 10/2021    Cervical disc disorder 12/2014    extreme headaches    Cervical spondylosis with myelopathy     Colon polyp 1/1/2012    Last colonoscopy 3/2022    Colon polyps     Elevated cholesterol     FHx: colonic polyps     Fracture of wrist     Broken thumb    GERD (gastroesophageal reflux disease)     History of colonic polyps     History of kidney stones     HL (hearing loss) 03/2017    WEARS HEARING AIDS    Hyperlipidemia     Hypertension     Benign Essential    Hypothyroidism     Insomnia     Low back pain 05/2017    pain is worse in 2017. siadica    Malaise and fatigue     HANNAH (obstructive sleep apnea) 10/20/2022    Home sleep study.  Weight 188 pounds.  Mild HANNAH with AHI 6.5 events per hour for total sleep time.  During REM, severe HANNAH with AHI 31.4 events per hour.  The patient snored 43.2% of total sleep time.    Periarthritis of shoulder 1/1/2019    Pain in both shoulders    Pure hypercholesterolemia     Rotator cuff syndrome 1/1/2020    Pain in both shoulders    Scoliosis 11/2017    see recent x-rays. 3,4,5 vertabraes    Tennis elbow 1/1/2010    Shots       Past Surgical History:  Past Surgical History:   Procedure Laterality Date    BASAL CELL CARCINOMA EXCISION  07/2023    Dr Olsen // [facility]    CATARACT EXTRACTION, BILATERAL Bilateral 10/2021    COLONOSCOPY N/A 11/04/2011     COLONOSCOPY N/A 2005    COLONOSCOPY N/A 11/15/2013    COLONOSCOPY W/ POLYPECTOMY      APPROX     CYST REMOVAL  2014    FRACTURE SURGERY      right thumb    HAND SURGERY Left     3rd finger    SKIN BIOPSY      scalp    VASECTOMY         Family History:  Family History   Problem Relation Age of Onset    Hypertension Mother     Heart failure Mother     Hearing loss Mother     Hyperlipidemia Mother     Aortic aneurysm Father     Arthritis Father             Heart disease Father         . 3 heart attacks    Hyperlipidemia Father     Liver cancer Sister     Breast cancer Sister     Hyperlipidemia Sister          from breast cancer, 2018    Diabetes type II Sister 82    Prostate cancer Brother     Heart disease Brother         Stent implant    Hypertension Brother     Sleep apnea Brother     Hypothyroidism Brother     Malig Hyperthermia Neg Hx        Social History:   reports that he has never smoked. He has never used smokeless tobacco. He reports current alcohol use of about 2.0 standard drinks of alcohol per week. He reports that he does not use drugs.    Medications:   No medications prior to admission.       Allergies:  Azithromycin    ROS:    Pertinent items are noted in HPI     Objective     There were no vitals taken for this visit.    Physical Exam   Constitutional: Pt is oriented to person, place, and time and well-developed, well-nourished, and in no distress.   Mouth/Throat: Oropharynx is clear and moist.   Neck: Normal range of motion.   Cardiovascular: Normal rate, regular rhythm and normal heart sounds.    Pulmonary/Chest: Effort normal and breath sounds normal.   Abdominal: Soft. Nontender  Skin: Skin is warm and dry.   Psychiatric: Mood, memory, affect and judgment normal.     Assessment & Plan     Diagnosis:  Polyps  Family history    Anticipated Surgical Procedure:  Colonoscopy    The risks, benefits, and alternatives of this procedure have been discussed with the  patient or the responsible party- the patient understands and agrees to proceed.

## 2025-03-10 LAB
CYTO UR: NORMAL
LAB AP CASE REPORT: NORMAL
PATH REPORT.FINAL DX SPEC: NORMAL
PATH REPORT.GROSS SPEC: NORMAL

## 2025-03-24 ENCOUNTER — OFFICE VISIT (OUTPATIENT)
Dept: INTERNAL MEDICINE | Facility: CLINIC | Age: 75
End: 2025-03-24
Payer: MEDICARE

## 2025-03-24 VITALS
HEIGHT: 70 IN | SYSTOLIC BLOOD PRESSURE: 154 MMHG | HEART RATE: 68 BPM | TEMPERATURE: 98.2 F | BODY MASS INDEX: 27.77 KG/M2 | OXYGEN SATURATION: 100 % | WEIGHT: 194 LBS | DIASTOLIC BLOOD PRESSURE: 94 MMHG

## 2025-03-24 DIAGNOSIS — I10 BENIGN ESSENTIAL HTN: Primary | Chronic | ICD-10-CM

## 2025-03-24 DIAGNOSIS — J30.9 ALLERGIC RHINITIS, UNSPECIFIED SEASONALITY, UNSPECIFIED TRIGGER: ICD-10-CM

## 2025-03-24 RX ORDER — MONTELUKAST SODIUM 10 MG/1
10 TABLET ORAL NIGHTLY
Qty: 90 TABLET | Refills: 1 | Status: SHIPPED | OUTPATIENT
Start: 2025-03-24 | End: 2025-04-04 | Stop reason: SDUPTHER

## 2025-03-24 RX ORDER — FLUTICASONE PROPIONATE 50 MCG
2 SPRAY, SUSPENSION (ML) NASAL DAILY
Qty: 16 G | Refills: 1 | Status: SHIPPED | OUTPATIENT
Start: 2025-03-24 | End: 2025-04-23

## 2025-03-24 RX ORDER — CETIRIZINE HYDROCHLORIDE 10 MG/1
10 TABLET ORAL DAILY
Start: 2025-03-24 | End: 2025-04-03

## 2025-04-01 ENCOUNTER — TELEPHONE (OUTPATIENT)
Dept: INTERNAL MEDICINE | Facility: CLINIC | Age: 75
End: 2025-04-01

## 2025-04-01 NOTE — TELEPHONE ENCOUNTER
Discussed with patient and Yecenia-patient c/o low back pain which has improved with taking a Hydrocodone. He will continue to increase clear fluids and RTC or ER if pain persists. His CT abd/pelvis 12/2023 was consistent with kidney stones, will need repeat CT abd/pelvis if pain persists to further evaluate for possible stones.

## 2025-04-01 NOTE — TELEPHONE ENCOUNTER
Caller: Yecenia Montenegro     Relationship: [facility]      Best call back number: [phone]     What is your medical concern?     PATIENT IS HAVING A LOT OF PAIN TRYING TO PASS A KIDNEY STONE.  I SUGGESTED THE ER TO THE SPOUSE HOWEVER, SHE WANTS STEFANO GUTHRIE TO CALL HER BACK AND TELL HER WHAT SHE NEEDS TO DO.    SHE WANTED TO KNOW SHOULD SHE JUST LET HIM REST AND LET IT PASS.      PLEASE ADVISE

## 2025-04-04 DIAGNOSIS — J30.9 ALLERGIC RHINITIS, UNSPECIFIED SEASONALITY, UNSPECIFIED TRIGGER: ICD-10-CM

## 2025-04-04 RX ORDER — MONTELUKAST SODIUM 10 MG/1
10 TABLET ORAL NIGHTLY
Qty: 90 TABLET | Refills: 1 | Status: SHIPPED | OUTPATIENT
Start: 2025-04-04

## 2025-04-04 NOTE — TELEPHONE ENCOUNTER
Rx Refill Note  Requested Prescriptions     Pending Prescriptions Disp Refills    montelukast (SINGULAIR) 10 MG tablet 90 tablet 1     Sig: Take 1 tablet by mouth Every Night.      Last office visit with prescribing clinician: 3/24/2025   Last telemedicine visit with prescribing clinician: Visit date not found   Next office visit with prescribing clinician: 6/25/2025                         Would you like a call back once the refill request has been completed: [] Yes [] No    If the office needs to give you a call back, can they leave a voicemail: [] Yes [] No    Yasmine Shaw  04/04/25, 11:57 EDT

## 2025-04-06 PROBLEM — J30.9 ALLERGIC RHINITIS: Status: ACTIVE | Noted: 2025-04-06

## 2025-04-06 PROBLEM — J30.9 ALLERGIC RHINITIS: Chronic | Status: ACTIVE | Noted: 2025-04-06

## 2025-04-06 RX ORDER — AMLODIPINE BESYLATE 5 MG/1
5 TABLET ORAL DAILY
Qty: 30 TABLET | Refills: 1 | Status: SHIPPED | OUTPATIENT
Start: 2025-04-06

## 2025-04-06 NOTE — ASSESSMENT & PLAN NOTE
His symptoms suggest an allergic etiology. He is advised to discontinue the use of decongestants due to their potential to elevate blood pressure and irritate the prostate. A regimen of Zyrtec at night, Flonase nasal spray (2 sprays in each nostril), and Singulair at night has been recommended. Prescriptions for Flonase and Singulair have been sent to [facility]. He is instructed to gradually reduce the use of these medications as his condition improves. If there is no improvement in his symptoms, he is advised to contact the office.

## 2025-04-06 NOTE — PROGRESS NOTES
Chief Complaint  Allergies (Sinus drainage, clear, started about 5 weeks ago//Occasional headaches) and Hypertension (Elevated readings at home - 170/103)  Subjective        Anoop Montenegro presents to [facility]  Allergies  Symptoms include congestion and sore throat.    Pertinent negative symptoms include no abdominal pain, no anorexia, no joint pain, no change in stool, no chest pain, no chills, no cough, no diaphoresis, no fatigue, no fever, no headaches, no joint swelling, no myalgias, no nausea, no neck pain, no numbness, no rash, no swollen glands, no dysuria, no vertigo, no visual change, no vomiting and no weakness.   Hypertension  Pertinent negatives include no chest pain, headaches or neck pain.     Symptoms are: new.   Onset was 1 to 6 months.   Symptoms occur: constantly.  Symptoms include: nasal congestion and sore throat.   Pertinent negative symptoms include no abdominal pain, no anorexia, no joint pain, no change in stool, no chest pain, no chills, no cough, no diaphoresis, no fatigue, no fever, no headaches, no joint swelling, no myalgias, no nausea, no neck pain, no numbness, no rash, no swollen glands, no dysuria, no vertigo, no visual change, no vomiting and no weakness.   Treatment and/or Medications comments include: Decongestant   Additional information: Drainage is almost always clear.    History of Present Illness  The patient presents for evaluation of sinus congestion and hypertension.    He has been experiencing persistent sinus congestion for the past 5 to 6 weeks, characterized by constant drainage that intensifies during the night. The discharge is predominantly clear, with occasional yellowish tinge. He reports increased fatigue and a sensation of pressure in his head but does not experience any chest congestion. He has a history of sinus issues but no known allergies. He recalls a previous issue with nasal spray dependency many years ago. He has been  "managing his symptoms with an over-the-counter decongestant, taken intermittently for 3 to 4 days.    He has been monitoring his blood pressure at home, which was recorded as 165/94 on 02/24/2025, 147/94 on 03/07/2025, 170/103 on 03/11/2025, 161/87 on 03/12/2025, and 134/87 on 03/13/2025. He has not been on any other antihypertensive medications. He reports no chest tightness or shortness of breath and maintains an active lifestyle. He also reports no dizziness or lightheadedness. He is currently on irbesartan, administered at night.    Supplemental Information  He takes his thyroid medication at 3:00 AM.    Objective   Vital Signs:  /94 (BP Location: Left arm, Patient Position: Sitting, Cuff Size: Adult)   Pulse 68   Temp 98.2 °F (36.8 °C)   Ht 177.8 cm (70\")   Wt 88 kg (194 lb)   SpO2 100%   BMI 27.84 kg/m²   Estimated body mass index is 27.84 kg/m² as calculated from the following:    Height as of this encounter: 177.8 cm (70\").    Weight as of this encounter: 88 kg (194 lb).            Physical Exam  Constitutional:       Appearance: He is well-developed. He is not ill-appearing.   HENT:      Head: Normocephalic.      Right Ear: Decreased hearing noted.      Left Ear: Decreased hearing noted.      Ears:      Comments: Bilateral hearing aids     Nose: Nose normal. No nasal deformity, mucosal edema or rhinorrhea.      Right Sinus: No maxillary sinus tenderness or frontal sinus tenderness.      Left Sinus: No maxillary sinus tenderness or frontal sinus tenderness.      Mouth/Throat:      Dentition: Normal dentition.      Pharynx: Postnasal drip present.   Eyes:      General: Lids are normal.         Right eye: No discharge.         Left eye: No discharge.      Conjunctiva/sclera: Conjunctivae normal.      Right eye: No exudate.     Left eye: No exudate.  Neck:      Thyroid: No thyroid mass or thyromegaly.      Vascular: No carotid bruit.      Trachea: Trachea normal.   Cardiovascular:      Rate and " Rhythm: Regular rhythm.      Pulses: Normal pulses.      Heart sounds: Normal heart sounds. No murmur heard.  Pulmonary:      Effort: No respiratory distress.      Breath sounds: Normal breath sounds. No decreased breath sounds, wheezing, rhonchi or rales.   Abdominal:      General: Bowel sounds are normal.      Palpations: Abdomen is soft.      Tenderness: There is no abdominal tenderness.   Musculoskeletal:      Cervical back: Normal range of motion. No edema.   Lymphadenopathy:      Head:      Right side of head: No submental, submandibular, tonsillar, preauricular, posterior auricular or occipital adenopathy.      Left side of head: No submental, submandibular, tonsillar, preauricular, posterior auricular or occipital adenopathy.   Skin:     General: Skin is warm and dry.      Nails: There is no clubbing.   Neurological:      Mental Status: He is alert.   Psychiatric:         Behavior: Behavior is cooperative.            Result Review :  The following data was reviewed by: DONNIE Narayan on 03/24/2025:  Common labs          6/19/2024    09:56 12/23/2024    08:58   Common Labs   Glucose 89  89    BUN 18  17    Creatinine 1.23  1.32    Sodium 139  138    Potassium 4.8  4.7    Chloride 104  104    Calcium 9.6  9.8    Albumin 4.4  4.1    Total Bilirubin 0.7  0.5    Alkaline Phosphatase 73  87    AST (SGOT) 21  19    ALT (SGPT) 24  21    WBC 3.78  4.33    Hemoglobin 16.5  16.9    Hematocrit 49.7  49.9    Platelets 276  299    Total Cholesterol 126  137    Triglycerides 78  69    HDL Cholesterol 50  50    LDL Cholesterol  60  73    Hemoglobin A1C  5.70    PSA  3.940           Results               Assessment and Plan   Diagnoses and all orders for this visit:    1. Benign essential HTN (Primary)  Assessment & Plan:  His blood pressure readings have been consistently elevated over the past few months. He will continue on the maximum dose of irbesartan. Additionally, a low dose of amlodipine 5 mg will be  introduced into his treatment plan. He is advised to take amlodipine in the morning and irbesartan at night. If this schedule proves challenging, he may take both medications at night. A carotid ultrasound has been ordered to screen for carotid artery disease.    Orders:  -     amLODIPine (NORVASC) 5 MG tablet; Take 1 tablet by mouth Daily.  Dispense: 30 tablet; Refill: 1    2. Allergic rhinitis, unspecified seasonality, unspecified trigger  Assessment & Plan:  His symptoms suggest an allergic etiology. He is advised to discontinue the use of decongestants due to their potential to elevate blood pressure and irritate the prostate. A regimen of Zyrtec at night, Flonase nasal spray (2 sprays in each nostril), and Singulair at night has been recommended. Pres[facility] Fl[facility] and [facility]. He is instructed to gradually reduce the use of these medications as his condition improves. If there is no improvement in his symptoms, he is advised to contact the office.    Orders:  -     Discontinue: montelukast (SINGULAIR) 10 MG tablet; Take 1 tablet by mouth Every Night.  Dispense: 90 tablet; Refill: 1  -     cetirizine (zyrTEC) 10 MG tablet; Take 1 tablet by mouth Daily for 10 days.  -     fluticasone (FLONASE) 50 MCG/ACT nasal spray; Administer 2 sprays into the nostril(s) as directed by provider Daily for 30 days.  Dispense: 16 g; Refill: 1      Assessment & Plan           Follow Up   Return if symptoms worsen or fail to improve, for Next scheduled follow up.  Patient was given instructions and counseling regarding his condition or for health maintenance advice. Please see specific information pulled into the AVS if appropriate.     Patient or patient representative verbalized consent for the use of Ambient Listening during the visit with  DONNIE Narayan for chart documentation. 4/6/2025  11:22 EDT

## 2025-04-06 NOTE — ASSESSMENT & PLAN NOTE
His blood pressure readings have been consistently elevated over the past few months. He will continue on the maximum dose of irbesartan. Additionally, a low dose of amlodipine 5 mg will be introduced into his treatment plan. He is advised to take amlodipine in the morning and irbesartan at night. If this schedule proves challenging, he may take both medications at night. A carotid ultrasound has been ordered to screen for carotid artery disease.

## 2025-04-22 ENCOUNTER — TELEPHONE (OUTPATIENT)
Dept: INTERNAL MEDICINE | Facility: CLINIC | Age: 75
End: 2025-04-22
Payer: MEDICARE

## 2025-04-22 NOTE — TELEPHONE ENCOUNTER
Caller: [facility] - [place], [place] - [address] - [phone]  - [phone]    Relationship: Pharmacy    Best call back number: [phone]         What was the call regarding: PHARMACY IS ASKING FOR 90 DAY SUPPLY WITH 3 REFILLS FOR MEDICATION     amLODIPine (NORVASC) 5 MG tablet     DOCTOR CAN ESCRIBE THE MEDICATIONS IF EASIER     PATIENT IS AT RISK FOR RUNNING OUT OF MEDICATION      [phone]

## 2025-04-25 ENCOUNTER — PATIENT MESSAGE (OUTPATIENT)
Dept: INTERNAL MEDICINE | Facility: CLINIC | Age: 75
End: 2025-04-25
Payer: MEDICARE

## 2025-04-25 DIAGNOSIS — I10 BENIGN ESSENTIAL HTN: Chronic | ICD-10-CM

## 2025-04-25 RX ORDER — AMLODIPINE BESYLATE 5 MG/1
5 TABLET ORAL DAILY
Qty: 90 TABLET | Refills: 1 | Status: SHIPPED | OUTPATIENT
Start: 2025-04-25

## 2025-06-23 DIAGNOSIS — I10 BENIGN ESSENTIAL HTN: ICD-10-CM

## 2025-06-23 RX ORDER — IRBESARTAN 300 MG/1
300 TABLET ORAL DAILY
Qty: 90 TABLET | Refills: 3 | Status: SHIPPED | OUTPATIENT
Start: 2025-06-23

## 2025-06-23 RX ORDER — ATORVASTATIN CALCIUM 20 MG/1
20 TABLET, FILM COATED ORAL DAILY
Qty: 90 TABLET | Refills: 3 | Status: SHIPPED | OUTPATIENT
Start: 2025-06-23

## 2025-06-23 RX ORDER — LEVOTHYROXINE SODIUM 112 UG/1
112 TABLET ORAL DAILY
Qty: 90 TABLET | Refills: 3 | Status: SHIPPED | OUTPATIENT
Start: 2025-06-23

## 2025-08-11 ENCOUNTER — OFFICE VISIT (OUTPATIENT)
Dept: INTERNAL MEDICINE | Facility: CLINIC | Age: 75
End: 2025-08-11
Payer: MEDICARE

## 2025-08-11 VITALS
DIASTOLIC BLOOD PRESSURE: 78 MMHG | BODY MASS INDEX: 26.37 KG/M2 | HEART RATE: 69 BPM | SYSTOLIC BLOOD PRESSURE: 134 MMHG | WEIGHT: 184.2 LBS | OXYGEN SATURATION: 97 % | HEIGHT: 70 IN

## 2025-08-11 DIAGNOSIS — I10 BENIGN ESSENTIAL HTN: Chronic | ICD-10-CM

## 2025-08-11 DIAGNOSIS — E78.00 PURE HYPERCHOLESTEROLEMIA: Chronic | ICD-10-CM

## 2025-08-11 DIAGNOSIS — R97.20 ELEVATED PSA: ICD-10-CM

## 2025-08-11 DIAGNOSIS — N52.9 ERECTILE DYSFUNCTION, UNSPECIFIED ERECTILE DYSFUNCTION TYPE: ICD-10-CM

## 2025-08-11 DIAGNOSIS — Z00.00 MEDICARE ANNUAL WELLNESS VISIT, SUBSEQUENT: Primary | ICD-10-CM

## 2025-08-11 DIAGNOSIS — M48.061 SPINAL STENOSIS OF LUMBAR REGION WITHOUT NEUROGENIC CLAUDICATION: Chronic | ICD-10-CM

## 2025-08-11 DIAGNOSIS — E03.9 ACQUIRED HYPOTHYROIDISM: Chronic | ICD-10-CM

## 2025-08-11 DIAGNOSIS — R73.09 ELEVATED GLUCOSE: ICD-10-CM

## 2025-08-11 LAB
ALBUMIN SERPL-MCNC: 4.5 G/DL (ref 3.5–5.2)
ALBUMIN/GLOB SERPL: 1.9 G/DL
ALP SERPL-CCNC: 90 U/L (ref 39–117)
ALT SERPL-CCNC: 26 U/L (ref 1–41)
AST SERPL-CCNC: 26 U/L (ref 1–40)
BILIRUB SERPL-MCNC: 0.5 MG/DL (ref 0–1.2)
BUN SERPL-MCNC: 18 MG/DL (ref 8–23)
BUN/CREAT SERPL: 12.2 (ref 7–25)
CALCIUM SERPL-MCNC: 10.1 MG/DL (ref 8.6–10.5)
CHLORIDE SERPL-SCNC: 104 MMOL/L (ref 98–107)
CHOLEST SERPL-MCNC: 129 MG/DL (ref 0–200)
CO2 SERPL-SCNC: 25.8 MMOL/L (ref 22–29)
CREAT SERPL-MCNC: 1.47 MG/DL (ref 0.76–1.27)
EGFRCR SERPLBLD CKD-EPI 2021: 50.1 ML/MIN/1.73
ERYTHROCYTE [DISTWIDTH] IN BLOOD BY AUTOMATED COUNT: 13.1 % (ref 12.3–15.4)
GLOBULIN SER CALC-MCNC: 2.4 GM/DL
GLUCOSE SERPL-MCNC: 97 MG/DL (ref 65–99)
HBA1C MFR BLD: 5.6 % (ref 4.8–5.6)
HCT VFR BLD AUTO: 47 % (ref 37.5–51)
HDLC SERPL-MCNC: 49 MG/DL (ref 40–60)
HGB BLD-MCNC: 15.6 G/DL (ref 13–17.7)
LDLC SERPL CALC-MCNC: 68 MG/DL (ref 0–100)
MCH RBC QN AUTO: 32.6 PG (ref 26.6–33)
MCHC RBC AUTO-ENTMCNC: 33.2 G/DL (ref 31.5–35.7)
MCV RBC AUTO: 98.3 FL (ref 79–97)
PLATELET # BLD AUTO: 341 10*3/MM3 (ref 140–450)
POTASSIUM SERPL-SCNC: 5.4 MMOL/L (ref 3.5–5.2)
PROT SERPL-MCNC: 6.9 G/DL (ref 6–8.5)
PSA SERPL-MCNC: 4.77 NG/ML (ref 0–4)
RBC # BLD AUTO: 4.78 10*6/MM3 (ref 4.14–5.8)
SODIUM SERPL-SCNC: 140 MMOL/L (ref 136–145)
TRIGL SERPL-MCNC: 54 MG/DL (ref 0–150)
TSH SERPL DL<=0.005 MIU/L-ACNC: 1.15 UIU/ML (ref 0.27–4.2)
VLDLC SERPL CALC-MCNC: 12 MG/DL (ref 5–40)
WBC # BLD AUTO: 4.76 10*3/MM3 (ref 3.4–10.8)

## 2025-08-11 RX ORDER — SILDENAFIL 100 MG/1
100 TABLET, FILM COATED ORAL DAILY PRN
Qty: 10 TABLET | Refills: 3 | Status: SHIPPED | OUTPATIENT
Start: 2025-08-11

## 2025-08-13 PROBLEM — G89.29 CHRONIC BILATERAL LOW BACK PAIN WITHOUT SCIATICA: Chronic | Status: RESOLVED | Noted: 2019-11-13 | Resolved: 2025-08-13

## 2025-08-13 PROBLEM — M54.50 CHRONIC BILATERAL LOW BACK PAIN WITHOUT SCIATICA: Chronic | Status: RESOLVED | Noted: 2019-11-13 | Resolved: 2025-08-13

## 2025-08-18 ENCOUNTER — PATIENT MESSAGE (OUTPATIENT)
Dept: INTERNAL MEDICINE | Facility: CLINIC | Age: 75
End: 2025-08-18
Payer: MEDICARE

## 2025-08-18 DIAGNOSIS — N18.31 STAGE 3A CHRONIC KIDNEY DISEASE: Primary | ICD-10-CM

## 2025-08-18 DIAGNOSIS — R97.20 ELEVATED PSA: ICD-10-CM

## 2025-08-18 DIAGNOSIS — E87.5 SERUM POTASSIUM ELEVATED: ICD-10-CM

## 2025-08-19 ENCOUNTER — LAB (OUTPATIENT)
Facility: HOSPITAL | Age: 75
End: 2025-08-19
Payer: MEDICARE

## 2025-08-19 DIAGNOSIS — N18.31 STAGE 3A CHRONIC KIDNEY DISEASE: ICD-10-CM

## 2025-08-19 LAB
ANION GAP SERPL CALCULATED.3IONS-SCNC: 10.8 MMOL/L (ref 5–15)
BUN SERPL-MCNC: 17 MG/DL (ref 8–23)
BUN/CREAT SERPL: 13.5 (ref 7–25)
CALCIUM SPEC-SCNC: 10 MG/DL (ref 8.6–10.5)
CHLORIDE SERPL-SCNC: 105 MMOL/L (ref 98–107)
CO2 SERPL-SCNC: 25.2 MMOL/L (ref 22–29)
CREAT SERPL-MCNC: 1.26 MG/DL (ref 0.76–1.27)
EGFRCR SERPLBLD CKD-EPI 2021: 60.2 ML/MIN/1.73
GLUCOSE SERPL-MCNC: 101 MG/DL (ref 65–99)
POTASSIUM SERPL-SCNC: 4.3 MMOL/L (ref 3.5–5.2)
SODIUM SERPL-SCNC: 141 MMOL/L (ref 136–145)

## 2025-08-19 PROCEDURE — 36415 COLL VENOUS BLD VENIPUNCTURE: CPT

## 2025-08-19 PROCEDURE — 80048 BASIC METABOLIC PNL TOTAL CA: CPT

## 2025-08-22 ENCOUNTER — TELEPHONE (OUTPATIENT)
Dept: INTERNAL MEDICINE | Facility: CLINIC | Age: 75
End: 2025-08-22
Payer: MEDICARE

## (undated) DEVICE — SINGLE-USE BIOPSY FORCEPS: Brand: RADIAL JAW 4

## (undated) DEVICE — CANN O2 ETCO2 FITS ALL CONN CO2 SMPL A/ 7IN DISP LF

## (undated) DEVICE — LN SMPL CO2 SHTRM SD STREAM W/M LUER

## (undated) DEVICE — KT ORCA ORCAPOD DISP STRL

## (undated) DEVICE — LASSO POLYPECTOMY SNARE: Brand: LASSO

## (undated) DEVICE — TUBING, SUCTION, 1/4" X 10', STRAIGHT: Brand: MEDLINE

## (undated) DEVICE — SENSR O2 OXIMAX FNGR A/ 18IN NONSTR

## (undated) DEVICE — ADAPT CLN BIOGUARD AIR/H2O DISP